# Patient Record
Sex: MALE | Race: OTHER | NOT HISPANIC OR LATINO | URBAN - METROPOLITAN AREA
[De-identification: names, ages, dates, MRNs, and addresses within clinical notes are randomized per-mention and may not be internally consistent; named-entity substitution may affect disease eponyms.]

---

## 2022-10-17 ENCOUNTER — INPATIENT (INPATIENT)
Facility: HOSPITAL | Age: 52
LOS: 11 days | Discharge: HOME HEALTH | DRG: 234 | End: 2022-10-29
Attending: THORACIC SURGERY (CARDIOTHORACIC VASCULAR SURGERY) | Admitting: INTERNAL MEDICINE
Payer: MEDICAID

## 2022-10-17 VITALS — WEIGHT: 139.99 LBS | HEIGHT: 64 IN

## 2022-10-17 DIAGNOSIS — I24.9 ACUTE ISCHEMIC HEART DISEASE, UNSPECIFIED: ICD-10-CM

## 2022-10-17 LAB
ALBUMIN SERPL ELPH-MCNC: 4.2 G/DL — SIGNIFICANT CHANGE UP (ref 3.3–5)
ALP SERPL-CCNC: 55 U/L — SIGNIFICANT CHANGE UP (ref 40–120)
ALT FLD-CCNC: 20 U/L — SIGNIFICANT CHANGE UP (ref 10–45)
ANION GAP SERPL CALC-SCNC: 11 MMOL/L — SIGNIFICANT CHANGE UP (ref 5–17)
APTT BLD: 33.6 SEC — SIGNIFICANT CHANGE UP (ref 27.5–35.5)
AST SERPL-CCNC: 29 U/L — SIGNIFICANT CHANGE UP (ref 10–40)
BASOPHILS # BLD AUTO: 0.02 K/UL — SIGNIFICANT CHANGE UP (ref 0–0.2)
BASOPHILS NFR BLD AUTO: 0.3 % — SIGNIFICANT CHANGE UP (ref 0–2)
BILIRUB SERPL-MCNC: 0.5 MG/DL — SIGNIFICANT CHANGE UP (ref 0.2–1.2)
BUN SERPL-MCNC: 10 MG/DL — SIGNIFICANT CHANGE UP (ref 7–23)
CALCIUM SERPL-MCNC: 9.1 MG/DL — SIGNIFICANT CHANGE UP (ref 8.4–10.5)
CHLORIDE SERPL-SCNC: 104 MMOL/L — SIGNIFICANT CHANGE UP (ref 96–108)
CO2 SERPL-SCNC: 22 MMOL/L — SIGNIFICANT CHANGE UP (ref 22–31)
CREAT SERPL-MCNC: 0.8 MG/DL — SIGNIFICANT CHANGE UP (ref 0.5–1.3)
EGFR: 106 ML/MIN/1.73M2 — SIGNIFICANT CHANGE UP
EOSINOPHIL # BLD AUTO: 0.06 K/UL — SIGNIFICANT CHANGE UP (ref 0–0.5)
EOSINOPHIL NFR BLD AUTO: 0.9 % — SIGNIFICANT CHANGE UP (ref 0–6)
FLUAV AG NPH QL: SIGNIFICANT CHANGE UP
FLUBV AG NPH QL: SIGNIFICANT CHANGE UP
GLUCOSE SERPL-MCNC: 98 MG/DL — SIGNIFICANT CHANGE UP (ref 70–99)
HCT VFR BLD CALC: 48.1 % — SIGNIFICANT CHANGE UP (ref 39–50)
HGB BLD-MCNC: 16.5 G/DL — SIGNIFICANT CHANGE UP (ref 13–17)
IMM GRANULOCYTES NFR BLD AUTO: 0.3 % — SIGNIFICANT CHANGE UP (ref 0–0.9)
INR BLD: 0.99 RATIO — SIGNIFICANT CHANGE UP (ref 0.88–1.16)
LIDOCAIN IGE QN: 63 U/L — HIGH (ref 7–60)
LYMPHOCYTES # BLD AUTO: 2.55 K/UL — SIGNIFICANT CHANGE UP (ref 1–3.3)
LYMPHOCYTES # BLD AUTO: 36.2 % — SIGNIFICANT CHANGE UP (ref 13–44)
MAGNESIUM SERPL-MCNC: 2.1 MG/DL — SIGNIFICANT CHANGE UP (ref 1.6–2.6)
MCHC RBC-ENTMCNC: 32.5 PG — SIGNIFICANT CHANGE UP (ref 27–34)
MCHC RBC-ENTMCNC: 34.3 GM/DL — SIGNIFICANT CHANGE UP (ref 32–36)
MCV RBC AUTO: 94.9 FL — SIGNIFICANT CHANGE UP (ref 80–100)
MONOCYTES # BLD AUTO: 0.65 K/UL — SIGNIFICANT CHANGE UP (ref 0–0.9)
MONOCYTES NFR BLD AUTO: 9.2 % — SIGNIFICANT CHANGE UP (ref 2–14)
NEUTROPHILS # BLD AUTO: 3.74 K/UL — SIGNIFICANT CHANGE UP (ref 1.8–7.4)
NEUTROPHILS NFR BLD AUTO: 53.1 % — SIGNIFICANT CHANGE UP (ref 43–77)
NRBC # BLD: 0 /100 WBCS — SIGNIFICANT CHANGE UP (ref 0–0)
NT-PROBNP SERPL-SCNC: 690 PG/ML — HIGH (ref 0–300)
PLATELET # BLD AUTO: 214 K/UL — SIGNIFICANT CHANGE UP (ref 150–400)
POTASSIUM SERPL-MCNC: 5.5 MMOL/L — HIGH (ref 3.5–5.3)
POTASSIUM SERPL-SCNC: 5.5 MMOL/L — HIGH (ref 3.5–5.3)
PROT SERPL-MCNC: 7.4 G/DL — SIGNIFICANT CHANGE UP (ref 6–8.3)
PROTHROM AB SERPL-ACNC: 11.5 SEC — SIGNIFICANT CHANGE UP (ref 10.5–13.4)
RBC # BLD: 5.07 M/UL — SIGNIFICANT CHANGE UP (ref 4.2–5.8)
RBC # FLD: 12 % — SIGNIFICANT CHANGE UP (ref 10.3–14.5)
RSV RNA NPH QL NAA+NON-PROBE: SIGNIFICANT CHANGE UP
SARS-COV-2 RNA SPEC QL NAA+PROBE: SIGNIFICANT CHANGE UP
SARS-COV-2 RNA SPEC QL NAA+PROBE: SIGNIFICANT CHANGE UP
SODIUM SERPL-SCNC: 137 MMOL/L — SIGNIFICANT CHANGE UP (ref 135–145)
TROPONIN T, HIGH SENSITIVITY RESULT: 14 NG/L — SIGNIFICANT CHANGE UP (ref 0–51)
WBC # BLD: 7.04 K/UL — SIGNIFICANT CHANGE UP (ref 3.8–10.5)
WBC # FLD AUTO: 7.04 K/UL — SIGNIFICANT CHANGE UP (ref 3.8–10.5)

## 2022-10-17 PROCEDURE — 99152 MOD SED SAME PHYS/QHP 5/>YRS: CPT

## 2022-10-17 PROCEDURE — 93308 TTE F-UP OR LMTD: CPT | Mod: 26

## 2022-10-17 PROCEDURE — 99285 EMERGENCY DEPT VISIT HI MDM: CPT

## 2022-10-17 PROCEDURE — 71046 X-RAY EXAM CHEST 2 VIEWS: CPT | Mod: 26

## 2022-10-17 PROCEDURE — 99255 IP/OBS CONSLTJ NEW/EST HI 80: CPT | Mod: GC

## 2022-10-17 PROCEDURE — 93458 L HRT ARTERY/VENTRICLE ANGIO: CPT | Mod: 26

## 2022-10-17 RX ORDER — HEPARIN SODIUM 5000 [USP'U]/ML
4000 INJECTION INTRAVENOUS; SUBCUTANEOUS ONCE
Refills: 0 | Status: DISCONTINUED | OUTPATIENT
Start: 2022-10-17 | End: 2022-10-17

## 2022-10-17 RX ORDER — HEPARIN SODIUM 5000 [USP'U]/ML
INJECTION INTRAVENOUS; SUBCUTANEOUS
Qty: 25000 | Refills: 0 | Status: DISCONTINUED | OUTPATIENT
Start: 2022-10-17 | End: 2022-10-17

## 2022-10-17 RX ORDER — HEPARIN SODIUM 5000 [USP'U]/ML
4000 INJECTION INTRAVENOUS; SUBCUTANEOUS ONCE
Refills: 0 | Status: COMPLETED | OUTPATIENT
Start: 2022-10-17 | End: 2022-10-17

## 2022-10-17 RX ORDER — TICAGRELOR 90 MG/1
180 TABLET ORAL ONCE
Refills: 0 | Status: COMPLETED | OUTPATIENT
Start: 2022-10-17 | End: 2022-10-17

## 2022-10-17 RX ORDER — HEPARIN SODIUM 5000 [USP'U]/ML
INJECTION INTRAVENOUS; SUBCUTANEOUS
Qty: 25000 | Refills: 0 | Status: DISCONTINUED | OUTPATIENT
Start: 2022-10-17 | End: 2022-10-18

## 2022-10-17 RX ORDER — SODIUM CHLORIDE 9 MG/ML
1000 INJECTION INTRAMUSCULAR; INTRAVENOUS; SUBCUTANEOUS
Refills: 0 | Status: DISCONTINUED | OUTPATIENT
Start: 2022-10-17 | End: 2022-10-22

## 2022-10-17 RX ORDER — ATORVASTATIN CALCIUM 80 MG/1
40 TABLET, FILM COATED ORAL AT BEDTIME
Refills: 0 | Status: DISCONTINUED | OUTPATIENT
Start: 2022-10-17 | End: 2022-10-24

## 2022-10-17 RX ORDER — HEPARIN SODIUM 5000 [USP'U]/ML
4000 INJECTION INTRAVENOUS; SUBCUTANEOUS EVERY 6 HOURS
Refills: 0 | Status: DISCONTINUED | OUTPATIENT
Start: 2022-10-17 | End: 2022-10-17

## 2022-10-17 RX ORDER — NICOTINE POLACRILEX 2 MG
1 GUM BUCCAL DAILY
Refills: 0 | Status: DISCONTINUED | OUTPATIENT
Start: 2022-10-17 | End: 2022-10-24

## 2022-10-17 RX ORDER — HEPARIN SODIUM 5000 [USP'U]/ML
4000 INJECTION INTRAVENOUS; SUBCUTANEOUS EVERY 6 HOURS
Refills: 0 | Status: DISCONTINUED | OUTPATIENT
Start: 2022-10-17 | End: 2022-10-18

## 2022-10-17 RX ADMIN — SODIUM CHLORIDE 100 MILLILITER(S): 9 INJECTION INTRAMUSCULAR; INTRAVENOUS; SUBCUTANEOUS at 15:56

## 2022-10-17 RX ADMIN — HEPARIN SODIUM 800 UNIT(S)/HR: 5000 INJECTION INTRAVENOUS; SUBCUTANEOUS at 13:17

## 2022-10-17 RX ADMIN — TICAGRELOR 180 MILLIGRAM(S): 90 TABLET ORAL at 13:13

## 2022-10-17 RX ADMIN — HEPARIN SODIUM 4000 UNIT(S): 5000 INJECTION INTRAVENOUS; SUBCUTANEOUS at 13:16

## 2022-10-17 RX ADMIN — ATORVASTATIN CALCIUM 40 MILLIGRAM(S): 80 TABLET, FILM COATED ORAL at 21:19

## 2022-10-17 NOTE — PATIENT PROFILE ADULT - NSPROGENBLOODRESTRICT_GEN_A_NUR
none
I will STOP taking the medications listed below when I get home from the hospital:    propranolol 20 mg/5 mL oral solution  -- 7.5 milliliter(s) by mouth 3 times a day

## 2022-10-17 NOTE — ED PROVIDER NOTE - PROGRESS NOTE DETAILS
Attending Rohini George: cards fellow at bedside. request full ACS treatment Attending Rohini George: d/w cards will need cath

## 2022-10-17 NOTE — H&P ADULT - HISTORY OF PRESENT ILLNESS
53yo M daily smoker 1ppd, Finnish presents today with chest pain from [facility] w abn EKG. he has Radiation to the L arm. Woke up with pain. Vomited x3. NBNB. States he has had intermittent chest pain ongoing for the past year. Has never seen a cardiologist or had EST/echo performed. EKG in the D w Anterolateral ST -T abn. going for [facility] as per cardiology team

## 2022-10-17 NOTE — H&P ADULT - ASSESSMENT
52 year old Greenlandic male, current 0.5 ppd smoker, with no know significant past medical history who presented to the ED at [facility] after being transferred from [facility] with abnormal ECG findings. The patient presented to [facility] earlier today with a 1 year history of exertional chest pain, consistent with typical angina, worse in the last 24 hours. Troponin T at [facility] WNL, hsTroponin ~2 hours later on admission labs WNL. On arrival ECG demonstrated ST segmental elevations in anterolateral leads with Q waves suggestive of prior infarct with ongoing symptoms. ECG did not meet criteria for STEMI. He was given brilenta 180 mg PO, loaded with IV heparin and started on heparin ggt. Patient was consented for cardiac catheterization. Patient to be admitted for management of NSTEMI. Seen by Cardiology    NSTEMI  - Patient s/p  + Brilinta 360 mg total  - Patient started on heparin ggt in the ED  - will con't aintain heparin ggt with q6 aPTTs, titrate to goal aPTT of 60-90 sec, until cardiac cath  - Follow up intervention/cath report thereafter  - TTE  - check Lipid panel, TSH, HGBA1C

## 2022-10-17 NOTE — ED PROVIDER NOTE - OBJECTIVE STATEMENT
53yo M daily smoker 1ppd, Sw[place] presents today with chest pain. Radiation to the L arm. Woke up with pain. Vomited x3. NBNB. States he has had intermittent chest pain ongoing for the past year. Has never seen a cardiologist or had EST/echo performed. 51yo M daily smoker 1ppd, British presents today with chest pain. Radiation to the L arm. Woke up with pain. Vomited x3. NBNB. States he has had intermittent chest pain ongoing for the past year. Has never seen a cardiologist or had EST/echo performed.  Attending Rohini George: agree with above 53 yo male presenting with chest pain with associated vomiting that began today. went to [facility] and had concerningn ekg and transferred. given aspirin and transferred. upon arrival pt denies any chest discomfort of sob. no black or bloody stools. no recent URI. no black stools. no h/o cardiac disease. +smoker.

## 2022-10-17 NOTE — ED ADULT NURSE NOTE - CAS TRG GENERAL NORM CIRC DET
Strong peripheral pulses
Ears: no ear pain and no hearing problems.Nose: no nasal congestion and no nasal drainage.Mouth/Throat: no dysphagia, no hoarseness and no throat pain.Neck: no lumps, no pain, no stiffness and no swollen glands.

## 2022-10-17 NOTE — ED ADULT NURSE NOTE - NS ED NURSE TRANSPORT WITH
Heparin/IV pump Heparin and cath labs cardiac monitor with 2 cath lab RN's/Cardiac Monitor/Defib/ACLS/Rescue Kit/O2/BVM/IV pump/ACLS Rescue Kit

## 2022-10-17 NOTE — CONSULT NOTE ADULT - SUBJECTIVE AND OBJECTIVE BOX
Patient seen and evaluated at bedside    Reason for consult:    HPI:  Patient is a 52 year old [place] Verde[place] male, current 0.5 ppd smoker, with no know significant past medical history who presented to the ED at [facility] after being transferred from [facility] with abnormal ECG findings. The patient presented to [facility] earlier today with a 1 year history of exertional chest pain, worse in the last 24 hours. HE states that over the past ~1 year he has had exertional chest pain, L sided with radiation to the R arm. Pain is described as pressure like, onset with physical activity and relieved with rest. He states he can walk ~3 blocks prior to onset of the pain, and pain is relieved after "a minute" of rest. He endorses occasional associated nausea. He denies associated vomiting, shortness of breath, jaw pain, diaphoresis, headache, changes in vision, abdominal pain, LE/UE edema, orthopnea, PND.    [facility] Course:  At [facility] was given  mg, brilenta 180 mg after ECG with ST changes in anterolateral leads concerning for ischemia. Additionally was given famotidine IV 20 mg, 1L NS. BMP, CBC both WNL. Troponin T negative x1. He was thereafter transferred to [facility] ED for ischemic evaluation.     [facility] ED Course: Vitals on arrival to the ED T 98.3 | HR 73 | /87 | SpO2 100% ORA. ECG demonstrated ST segmental elevations in anterolateral leads with Q waves suggestive of prior infarct with ongoing symptoms. ECG did not meet criteria for STEMI. He was given brilenta 180 mg PO, loaded with IV heparin and started on heparin ggt. Patient was consented for cardiac catheterization    MHx: No known PMHx; no known prior ECGs, no known stress tests, no known echocardiograms    Medications: ASA 81 mg QD    PSHx: NC    Allergies:  No Known Allergies    Current Medications:   heparin   Injectable 4000 Unit(s) IV Push every 6 hours PRN  heparin  Infusion.  Unit(s)/Hr IV Continuous <Continuous>  sodium chloride 0.9%. 1000 milliLiter(s) IV Continuous <Continuous>      FAMILY HISTORY: Mother  of "heart disease" and complications of DM. Father additionally with reported hx of "heart disease"       Social History: Current 0.5 ppd smoker; has ~20 pack year hx of smoking. Immigrated to the  from [facility] ~4-5 years ago. Works as a . Independent in [employer]. Deneis hx of recreationl drug use. States he will "occasionally" have a beer. Denies regular EtOH use.     Review of Systems:  REVIEW OF SYSTEMS:  CONSTITUTIONAL: No weakness, fevers or chills  EYES/ENT: No visual changes;  No dysphagia  NECK: No pain or stiffness  RESPIRATORY: No cough, wheezing, hemoptysis; No shortness of breath  CARDIOVASCULAR: No chest pain or palpitations; No lower extremity edema  GASTROINTESTINAL: No abdominal or epigastric pain. No nausea, vomiting, or hematemesis; No diarrhea or constipation. No melena or hematochezia.  BACK: No back pain  NEUROLOGICAL: No numbness or weakness   All other review of systems is negative unless indicated above.    [ ] All other systems negative  [ ] Unable to assess ROS due to    Physical Exam:  T(F): 98.9 (10-), Max: 98.9 (10-17)  HR: 78 (10-) (69 - 78)  BP: 148/95 (10-) (125/88 - 148/95)  RR: 18 (10-17)  SpO2: 99% (10-17)  GENERAL: No acute distress, well-developed  HEAD:  Atraumatic, Normocephalic  ENT: EOMI, PERRLA, conjunctiva and sclera clear, Neck supple, No JVD, moist mucosa  CHEST/LUNG: Clear to auscultation bilaterally; No wheeze, equal breath sounds bilaterally   HEART: Regular rate and rhythm; No appreciable murmurs, rubs, or gallops  ABDOMEN: Soft, Nontender, Nondistended; Bowel sounds present  EXTREMITIES:  No clubbing, cyanosis, or edema  PSYCH: Nl behavior, nl affect  NEUROLOGY: AAOx3, non-focal, cranial nerves intact  SKIN: Normal color, No rashes or lesions  LINES: PIV in R AC    CXR: Personally reviewed    Labs: Personally reviewed                        16.5   7.04  )-----------( 214      ( 17 Oct 2022 13:16 )             48.1     10-17    137  |  104  |  10  ----------------------------<  98  5.5<H>   |  22  |  0.80    Ca    9.1      17 Oct 2022 13:16  Mg     2.1     10-17    TPro  7.4  /  Alb  4.2  /  TBili  0.5  /  DBili  x   /  AST  29  /  ALT  20  /  AlkPhos  55  10-17    PT/INR - ( 17 Oct 2022 13:16 )   PT: 11.5 sec;   INR: 0.99 ratio         PTT - ( 17 Oct 2022 13:16 )  PTT:33.6 sec  Serum Pro-Brain Natriuretic Peptide: 690 pg/mL (10-17 @ 13:16)

## 2022-10-17 NOTE — ED ADULT NURSE NOTE - CHPI ED NUR SYMPTOMS NEG
no back pain/no chills/no congestion/no diaphoresis/no dizziness/no fever/no shortness of breath/no syncope

## 2022-10-17 NOTE — ED PROVIDER NOTE - PHYSICAL EXAMINATION
Attending Rohini George: Gen: NAD, heent: atrauamtic, eomi, perrla, mmm, op pink, uvula midline, neck; nttp, no nuchal rigidity, chest: nttp, no crepitus, cv: rrr, no murmurs, lungs: ctab, abd: soft, nontender, nondistended, no peritoneal signs, , no guarding, ext: wwp, neg homans, skin: no rash, neuro: awake and alert, following commands, speech clear, sensation and strength intact, no focal deficits

## 2022-10-17 NOTE — ED PROVIDER NOTE - ATTENDING CONTRIBUTION TO CARE
Attending MD Rohini George:  I personally have seen and examined this patient.  Resident note reviewed and agree on plan of care and except where noted.  See HPI, PE, and MDM for details.

## 2022-10-17 NOTE — CONSULT NOTE ADULT - ASSESSMENT
Patient is a 52 year old Berm[place]ian male, current 0.5 ppd smoker, with no know significant past medical history who presented to the ED at [facility] after being transferred from [facility] with abnormal ECG findings. The patient presented to [facility] earlier today with a 1 year history of exertional chest pain, consistent with typical angina, worse in the last 24 hours. Troponin T at [facility] WNL, hsTroponin ~2 hours later on admission labs WNL. On arrival ECG demonstrated ST segmental elevations in anterolateral leads with Q waves suggestive of prior infarct with ongoing symptoms. ECG did not meet criteria for STEMI. He was given brilenta 180 mg PO, loaded with IV heparin and started on heparin ggt. Patient was consented for cardiac catheterization. Patient to be admitted for management of NSTEMI.    #NSTEMI  - Patient s/p  + Brilinta 360 mg total  - Patient started on heparin ggt in the ED  - Maintain heparin ggt with q6 aPTTs, titrate to goal aPTT of 60-90 sec, until cardiac cath  - Follow up intervention/cath report thereafter  - Obtain TTE prior to discharge  - Cardiology to follow

## 2022-10-17 NOTE — ED ADULT NURSE NOTE - OBJECTIVE STATEMENT
PT is a 52 year old A&OX4 male with no significant PMH who presents to the ED via EMS as a STEMI transfer from [facility]. Per EMS, [facility] performed an EKG and transferred PT, no medication was administered. PT endorses chest pain for 1 year that became acutely worse last night. PT also endorsing 3-4 episodes of vomiting this morning. PT denies SOB, diarrhea, weakness, and fevers at home. PT is alert, oriented and resting comfortably in bed, breathing unlabored on room air, and speaking in complete sentences. Abdomen is soft, non-tender, and non-distended. Skin is warm and dry, no diaphoresis noted. No edema noted to B/L extremities. Strong strength in B/L extremities, sensation intact. IV access established 18G in right AC by [facility] and 18G in left AC by [facility] ED RN. EKG completed, PT placed on cardiac monitor. Actual weight obtained 68.7 kg. PT endorsing active cigarette smoking, 1 pack per day. PT placed in hospital gown, non-slip socks applied. PT ambulatory with steady gait. Safety and comfort maintained. Family at the bedside.

## 2022-10-17 NOTE — ED PROVIDER NOTE - CLINICAL SUMMARY MEDICAL DECISION MAKING FREE TEXT BOX
Attending Rohini George: 51 yo male current smoker transferred for abnormal ekg. upon arrival pt denies any chest pain. ekg with nonspecific st elevation and t wave inversions. cards fellow called upon arrival. pt placed on monitor. multiple risk factors for cardiac disease. will sergo need admission. no back pain and strong pulses less likely acute dissection

## 2022-10-17 NOTE — H&P ADULT - NSHPPHYSICALEXAM_GEN_ALL_CORE
T(C): 36.8 (10-17-22 @ 12:50), Max: 36.8 (10-17-22 @ 12:50)  HR: 69 (10-17-22 @ 13:30) (69 - 73)  BP: 125/88 (10-17-22 @ 13:30) (125/88 - 137/87)  RR: 17 (10-17-22 @ 13:30) (17 - 18)  SpO2: 99% (10-17-22 @ 13:30) (99% - 100%)    PHYSICAL EXAM:  GENERAL: NAD, well-developed  HEAD:  Atraumatic, Normocephalic  EYES: EOMI, PERRLA, conjunctiva and sclera clear  NECK: Supple, No JVD  CHEST/LUNG: Clear to auscultation bilaterally; No wheeze  HEART: Regular rate and rhythm; No murmurs, rubs, or gallops  ABDOMEN: Soft, Nontender, Nondistended; Bowel sounds present  EXTREMITIES:  2+ Peripheral Pulses, No clubbing, cyanosis, or edema  PSYCH: AAOx3  NEUROLOGY: non-focal  SKIN: No rashes or lesions

## 2022-10-17 NOTE — CONSULT NOTE ADULT - ATTENDING COMMENTS
52 year old man with progressive exertional angina, no symptoms at rest, EKG with ST-T wave abnormality consistent with ischemia, troponin is not elevated. Diagnosis is unstable angina, ACS protocol started and plan early coronary angiography. He is an active cigarette smoker.    To contact call Cardiology Fellow or Attending as listed on [email or web address] password: [email or web address].

## 2022-10-17 NOTE — ED ADULT NURSE REASSESSMENT NOTE - NS ED NURSE REASSESS COMMENT FT1
Report given to cath lab RN. PT transported to cath lab with 2 cath lab RN's on cath lab's cardiac monitor with their ACLS rescue kit. PT on heparin running on a pump at 8 mL/hour. PT's chart including EKG given to cath lab RN at the bedside.

## 2022-10-17 NOTE — ED ADULT NURSE REASSESSMENT NOTE - NS ED NURSE REASSESS COMMENT FT1
PT's actual weight obtained 68.7 kg. Heparin administered as per MD order with 2 RN's at the bedside. Heparin dose confirmed via ACS Heparin Nomogram. ACS nomogram states initial bolus should be 4,100 units but Dr. Maria A George states the STEMI order set prevents her from being able to order the correct dose. Per Dr. George, PT to receive initial dose of 4,000 units per order. PT educated on signs and symptoms of bleeding, verbalized understanding. Urinal provided. Safety and comfort maintained. Call bell within reach.

## 2022-10-18 DIAGNOSIS — I10 ESSENTIAL (PRIMARY) HYPERTENSION: ICD-10-CM

## 2022-10-18 DIAGNOSIS — E78.5 HYPERLIPIDEMIA, UNSPECIFIED: ICD-10-CM

## 2022-10-18 DIAGNOSIS — R07.9 CHEST PAIN, UNSPECIFIED: ICD-10-CM

## 2022-10-18 LAB
A1C WITH ESTIMATED AVERAGE GLUCOSE RESULT: 5.7 % — HIGH (ref 4–5.6)
APTT BLD: 99.4 SEC — HIGH (ref 27.5–35.5)
CHOLEST SERPL-MCNC: 203 MG/DL — HIGH
ESTIMATED AVERAGE GLUCOSE: 117 MG/DL — HIGH (ref 68–114)
HCT VFR BLD CALC: 45.5 % — SIGNIFICANT CHANGE UP (ref 39–50)
HDLC SERPL-MCNC: 54 MG/DL — SIGNIFICANT CHANGE UP
HGB BLD-MCNC: 15.5 G/DL — SIGNIFICANT CHANGE UP (ref 13–17)
LIPID PNL WITH DIRECT LDL SERPL: 136 MG/DL — HIGH
MCHC RBC-ENTMCNC: 31.8 PG — SIGNIFICANT CHANGE UP (ref 27–34)
MCHC RBC-ENTMCNC: 34.1 GM/DL — SIGNIFICANT CHANGE UP (ref 32–36)
MCV RBC AUTO: 93.4 FL — SIGNIFICANT CHANGE UP (ref 80–100)
NON HDL CHOLESTEROL: 150 MG/DL — HIGH
NRBC # BLD: 0 /100 WBCS — SIGNIFICANT CHANGE UP (ref 0–0)
PLATELET # BLD AUTO: 218 K/UL — SIGNIFICANT CHANGE UP (ref 150–400)
RBC # BLD: 4.87 M/UL — SIGNIFICANT CHANGE UP (ref 4.2–5.8)
RBC # FLD: 11.9 % — SIGNIFICANT CHANGE UP (ref 10.3–14.5)
TRIGL SERPL-MCNC: 70 MG/DL — SIGNIFICANT CHANGE UP
TSH SERPL-MCNC: 1.61 UIU/ML — SIGNIFICANT CHANGE UP (ref 0.27–4.2)
WBC # BLD: 7.31 K/UL — SIGNIFICANT CHANGE UP (ref 3.8–10.5)
WBC # FLD AUTO: 7.31 K/UL — SIGNIFICANT CHANGE UP (ref 3.8–10.5)

## 2022-10-18 PROCEDURE — 99233 SBSQ HOSP IP/OBS HIGH 50: CPT | Mod: GC

## 2022-10-18 PROCEDURE — 93306 TTE W/DOPPLER COMPLETE: CPT | Mod: 26

## 2022-10-18 RX ORDER — HEPARIN SODIUM 5000 [USP'U]/ML
2500 INJECTION INTRAVENOUS; SUBCUTANEOUS EVERY 6 HOURS
Refills: 0 | Status: DISCONTINUED | OUTPATIENT
Start: 2022-10-18 | End: 2022-10-22

## 2022-10-18 RX ORDER — HEPARIN SODIUM 5000 [USP'U]/ML
5500 INJECTION INTRAVENOUS; SUBCUTANEOUS EVERY 6 HOURS
Refills: 0 | Status: DISCONTINUED | OUTPATIENT
Start: 2022-10-18 | End: 2022-10-22

## 2022-10-18 RX ORDER — HEPARIN SODIUM 5000 [USP'U]/ML
5500 INJECTION INTRAVENOUS; SUBCUTANEOUS ONCE
Refills: 0 | Status: COMPLETED | OUTPATIENT
Start: 2022-10-18 | End: 2022-10-18

## 2022-10-18 RX ORDER — HEPARIN SODIUM 5000 [USP'U]/ML
INJECTION INTRAVENOUS; SUBCUTANEOUS
Qty: 25000 | Refills: 0 | Status: DISCONTINUED | OUTPATIENT
Start: 2022-10-18 | End: 2022-10-22

## 2022-10-18 RX ORDER — ASPIRIN/CALCIUM CARB/MAGNESIUM 324 MG
81 TABLET ORAL DAILY
Refills: 0 | Status: DISCONTINUED | OUTPATIENT
Start: 2022-10-18 | End: 2022-10-24

## 2022-10-18 RX ADMIN — Medication 1 PATCH: at 19:54

## 2022-10-18 RX ADMIN — HEPARIN SODIUM 5500 UNIT(S): 5000 INJECTION INTRAVENOUS; SUBCUTANEOUS at 10:06

## 2022-10-18 RX ADMIN — Medication 1 PATCH: at 13:23

## 2022-10-18 RX ADMIN — ATORVASTATIN CALCIUM 40 MILLIGRAM(S): 80 TABLET, FILM COATED ORAL at 19:59

## 2022-10-18 RX ADMIN — Medication 81 MILLIGRAM(S): at 07:01

## 2022-10-18 RX ADMIN — HEPARIN SODIUM 1200 UNIT(S)/HR: 5000 INJECTION INTRAVENOUS; SUBCUTANEOUS at 10:05

## 2022-10-18 RX ADMIN — HEPARIN SODIUM 1200 UNIT(S)/HR: 5000 INJECTION INTRAVENOUS; SUBCUTANEOUS at 17:23

## 2022-10-18 NOTE — CONSULT NOTE ADULT - ASSESSMENT
52 year old man with progressive exertional angina, no symptoms at rest, EKG with ST-T wave abnormality consistent with ischemia, troponin is not elevated.  He is an active cigarette smoker 0.5 ppd smoker. Study demonstrated severe 3 vessel disease. Echo demonstrates apical akinesis, apical thrombus, overall EF 45%. Maintain on heparin. CT surgery evaluation for revascularization 52 year old man with progressive exertional angina, no symptoms at rest, EKG with ST-T wave abnormality consistent with ischemia, troponin is not elevated.  He is an active cigarette smoker 0.5 ppd smoker. Study demonstrated severe 3 vessel disease. Echo demonstrates apical akinesis, apical thrombus, overall EF 45%. Maintain on heparin. CT surgery evaluation for revascularization    Preop work up initiated for CABG evaluation   Dr Ellis to review angiogram and imaging  check p2y12 levels in AM   hold p2y12i - hold luma 52 year old man with progressive exertional angina, no symptoms at rest, EKG with ST-T wave abnormality consistent with ischemia, troponin is not elevated.  He is an active cigarette smoker 0.5 ppd smoker. Study demonstrated severe 3 vessel disease. Echo demonstrates apical akinesis, apical thrombus, overall EF 45%. Maintain on heparin. CT surgery evaluation for revascularization    Preop work up initiated for CABG evaluation   Dr Ellis to review angiogram and imaging  check p2y12 serum levels in AM   hold p2y12i - hold brillinta  on hep gtt for LV thrombus  start betablocker as tolerated - currently nsr 60s   52 year old man with progressive exertional angina, no symptoms at rest, EKG with ST-T wave abnormality consistent with ischemia, troponin is not elevated.  He is an active cigarette smoker 0.5 ppd smoker. Study demonstrated severe 3 vessel disease. Echo demonstrates apical akinesis, apical thrombus, overall EF 45%. Maintain on heparin. CT surgery evaluation for revascularization    Preop work up initiated for CABG evaluation   Dr Ellis to review angiogram and imaging  TTE completed  check p2y12 serum levels in AM   hold p2y12i - hold brillinta  on hep gtt for LV thrombus  start betablocker as tolerated - currently nsr 60s

## 2022-10-18 NOTE — PROGRESS NOTE ADULT - SUBJECTIVE AND OBJECTIVE BOX
Patient is a 52y old  Male who presents with a chief complaint of acute MI, NSTEMI (18 Oct 2022 12:28)      SUBJECTIVE / OVERNIGHT EVENTS: s/p LHC: has 3 vessel dz, CTS eval pending, remains on Heparin drip    MEDICATIONS  (STANDING):  aspirin enteric coated 81 milliGRAM(s) Oral daily  atorvastatin 40 milliGRAM(s) Oral at bedtime  heparin  Infusion.  Unit(s)/Hr (12 mL/Hr) IV Continuous <Continuous>  nicotine -   7 mG/24Hr(s) Patch 1 Patch Transdermal daily  sodium chloride 0.9%. 1000 milliLiter(s) (100 mL/Hr) IV Continuous <Continuous>    MEDICATIONS  (PRN):  heparin   Injectable 5500 Unit(s) IV Push every 6 hours PRN For aPTT less than 40  heparin   Injectable 2500 Unit(s) IV Push every 6 hours PRN For aPTT between 40 - 57      Vital Signs Last 24 Hrs  T(F): 98.1 (10-18-22 @ 09:25), Max: 98.1 (10-18-22 @ 09:25)  HR: 55 (10-18-22 @ 09:25) (55 - 78)  BP: 107/68 (10-18-22 @ 09:25) (98/65 - 144/109)  RR: 16 (10-18-22 @ 09:25) (16 - 17)  SpO2: 96% (10-18-22 @ 09:25) (94% - 98%)  Telemetry:   CAPILLARY BLOOD GLUCOSE        I&O's Summary    17 Oct 2022 07:01  -  18 Oct 2022 07:00  --------------------------------------------------------  IN: 240 mL / OUT: 300 mL / NET: -60 mL    18 Oct 2022 07:01  -  18 Oct 2022 17:59  --------------------------------------------------------  IN: 480 mL / OUT: 0 mL / NET: 480 mL        PHYSICAL EXAM:  GENERAL: NAD, well-developed  HEAD:  Atraumatic, Normocephalic  EYES: EOMI, PERRLA, conjunctiva and sclera clear  NECK: Supple, No JVD  CHEST/LUNG: Clear to auscultation bilaterally; No wheeze  HEART: Regular rate and rhythm; No murmurs, rubs, or gallops  ABDOMEN: Soft, Nontender, Nondistended; Bowel sounds present  EXTREMITIES:  2+ Peripheral Pulses, No clubbing, cyanosis, or edema  PSYCH: AAOx3  NEUROLOGY: non-focal  SKIN: No rashes or lesions    LABS:                        15.5   7.31  )-----------( 218      ( 18 Oct 2022 16:37 )             45.5     10-17    137  |  104  |  10  ----------------------------<  98  5.5<H>   |  22  |  0.80    Ca    9.1      17 Oct 2022 13:16  Mg     2.1     10-17    TPro  7.4  /  Alb  4.2  /  TBili  0.5  /  DBili  x   /  AST  29  /  ALT  20  /  AlkPhos  55  10-17    PT/INR - ( 17 Oct 2022 13:16 )   PT: 11.5 sec;   INR: 0.99 ratio         PTT - ( 18 Oct 2022 16:37 )  PTT:99.4 sec          RADIOLOGY & ADDITIONAL TESTS:    Imaging Personally Reviewed:    Consultant(s) Notes Reviewed:      Care Discussed with Consultants/Other Providers:

## 2022-10-18 NOTE — PROGRESS NOTE ADULT - SUBJECTIVE AND OBJECTIVE BOX
Patient seen and examined at bedside.    Overnight Events: No acute events overnight. Denies chest pain or SOB      REVIEW OF SYSTEMS:  Constitutional:     [ x] negative [ ] fevers [ ] chills [ ] weight loss [ ] weight gain  HEENT:                  [ x] negative [ ] dry eyes [ ] eye irritation [ ] postnasal drip [ ] nasal congestion  CV:                         [x ] negative  [ ] chest pain [ ] orthopnea [ ] palpitations [ ] murmur  Resp:                     [ x] negative [ ] cough [ ] shortness of breath [ ] dyspnea [ ] wheezing [ ] sputum [ ]hemoptysis  GI:                          [ x] negative [ ] nausea [ ] vomiting [ ] diarrhea [ ] constipation [ ] abd pain [ ] dysphagia   :                        [ x] negative [ ] dysuria [ ] nocturia [ ] hematuria [ ] increased urinary frequency  Musculoskeletal: [ x] negative [ ] back pain [ ] myalgias [ ] arthralgias [ ] fracture  Skin:                       [ x] negative [ ] rash [ ] itch  Neurological:        [ x] negative [ ] headache [ ] dizziness [ ] syncope [ ] weakness [ ] numbness  Psychiatric:           [ x] negative [ ] anxiety [ ] depression  Endocrine:            [ x] negative [ ] diabetes [ ] thyroid problem  Heme/Lymph:      [ x] negative [ ] anemia [ ] bleeding problem  Allergic/Immune: [x ] negative [ ] itchy eyes [ ] nasal discharge [ ] hives [ ] angioedema    [x ] All other systems negative  [ ] Unable to assess ROS due to    Current Meds:  atorvastatin 40 milliGRAM(s) Oral at bedtime  heparin   Injectable 4000 Unit(s) IV Push every 6 hours PRN  nicotine -   7 mG/24Hr(s) Patch 1 Patch Transdermal daily  sodium chloride 0.9%. 1000 milliLiter(s) IV Continuous <Continuous>      PAST MEDICAL & SURGICAL HISTORY:      Vitals:  T(F): 98 (10-18), Max: 98.9 (10-17)  HR: 62 (10-18) (62 - 81)  BP: 105/68 (10-18) (98/65 - 148/95)  RR: 17 (10-18)  SpO2: 96% (10-18)  I&O's Summary    17 Oct 2022 07:01  -  18 Oct 2022 05:21  --------------------------------------------------------  IN: 240 mL / OUT: 300 mL / NET: -60 mL        Physical Exam:  Appearance: No acute distress; well appearing  Eyes: PERRL, EOMI, pink conjunctiva  HENT: Normal oral mucosa  Cardiovascular: RRR, S1, S2, no murmurs, rubs, or gallops; no edema; no JVD  Respiratory: Clear to auscultation bilaterally  Gastrointestinal: soft, non-tender, non-distended with normal bowel sounds  Musculoskeletal: No clubbing; no joint deformity   Neurologic: Non-focal  Lymphatic: No lymphadenopathy  Psychiatry: AAOx3, mood & affect appropriate  Skin: No rashes, ecchymoses, or cyanosis                          16.5   7.04  )-----------( 214      ( 17 Oct 2022 13:16 )             48.1     10-17    137  |  104  |  10  ----------------------------<  98  5.5<H>   |  22  |  0.80    Ca    9.1      17 Oct 2022 13:16  Mg     2.1     10-17    TPro  7.4  /  Alb  4.2  /  TBili  0.5  /  DBili  x   /  AST  29  /  ALT  20  /  AlkPhos  55  10-17    PT/INR - ( 17 Oct 2022 13:16 )   PT: 11.5 sec;   INR: 0.99 ratio         PTT - ( 17 Oct 2022 13:16 )  PTT:33.6 sec      Serum Pro-Brain Natriuretic Peptide: 690 pg/mL (10-17 @ 13:16)             Universal Safety Interventions

## 2022-10-18 NOTE — PROGRESS NOTE ADULT - ASSESSMENT
Patient is a 52 year old [place] male, current 0.5 ppd smoker, with no know significant past medical history who presented to the ED at [facility] after being transferred from [facility] with abnormal ECG findings. The patient presented to [facility] earlier today with a 1 year history of exertional chest pain, consistent with typical angina, worse in the last 24 hours. Troponin T at [facility] WNL, hsTroponin ~2 hours later on admission labs WNL. On arrival ECG demonstrated ST segmental elevations in anterolateral leads with Q waves suggestive of prior infarct with ongoing symptoms. ECG did not meet criteria for STEMI. He was given brilenta 180 mg PO, loaded with IV heparin and started on heparin ggt. Patient was consented for cardiac catheterization. Patient to be admitted for management of NSTEMI.    #NSTEMI  #Triple vessel CAD: LHC showing prox LAD , mid Cx 50%, prox RCA 40%, RPL    - Agree with CT Surgery evaluation   - Patient s/p  + Brilinta 360 mg total; continue ASA 81 qd, hold Brilinta pending CT Surg eval  - TTE and cardiac viability study pending  - continue Atorvastatin 40 qHS

## 2022-10-18 NOTE — PROGRESS NOTE ADULT - SUBJECTIVE AND OBJECTIVE BOX
HPI:   53yo M daily smoker 1ppd, [place] presents today with chest pain from [place] [facility] w abn EKG. he has Radiation to the L arm. Woke up with pain. Vomited x3. NBNB. States he has had intermittent chest pain ongoing for the past year. Has never seen a cardiologist or had EST/echo performed. EKG in the D w Anterolateral ST -T abn. going for [facility] as per cardiology team (17 Oct 2022 14:25)    Patient is a 52y old  Male who presents with a chief complaint of acute MI, NSTEMI (17 Oct 2022 15:54)          Allergies    No Known Allergies    Intolerances        Medications:  atorvastatin 40 milliGRAM(s) Oral at bedtime  heparin   Injectable 4000 Unit(s) IV Push every 6 hours PRN  heparin  Infusion.  Unit(s)/Hr IV Continuous <Continuous>  nicotine -   7 mG/24Hr(s) Patch 1 Patch Transdermal daily  sodium chloride 0.9%. 1000 milliLiter(s) IV Continuous <Continuous>      Vitals:  T(C): 36.8 (10-17-22 @ 17:05), Max: 37.2 (10-17-22 @ 15:15)  HR: 65 (10-17-22 @ 22:20) (65 - 81)  BP: 116/64 (10-17-22 @ 22:20) (98/65 - 148/95)  BP(mean): 75 (10-17-22 @ 21:20) (73 - 118)  RR: 16 (10-17-22 @ 19:20) (16 - 19)  SpO2: 96% (10-17-22 @ 22:20) (94% - 100%)  Wt(kg): --  Daily Height in cm: 162.56 (17 Oct 2022 12:40)    Daily   I&O's Summary    17 Oct 2022 07:01  -  18 Oct 2022 02:12  --------------------------------------------------------  IN: 240 mL / OUT: 300 mL / NET: -60 mL          Physical Exam:  Appearance: Normal  Eyes: PERRL, EOMI  HENT: Normal oral mus[place]a, NC/AT  Cardiovascular: S1S2, RRR, No M/R/G, no JVD, No Lower extremity edema  Procedural Access Site: No hematoma, Non-tender to palpation, 2+ pulse, No bruit, No Ecchymosis  Respiratory: Clear to auscultation bilaterally  Gastrointestinal: Soft, Non tender, Normal Bowel Sounds  Musculoskeletal: No clubbing, No joint deformity   Neurologic: Non-focal  Lymphatic: No lymphadenopathy  Psychiatry: AAOx3, Mood & affect appropriate  Skin: No rashes, No ecchymoses, No cyanosis    10-17    137  |  104  |  10  ----------------------------<  98  5.5<H>   |  22  |  0.80    Ca    9.1      17 Oct 2022 13:16  Mg     2.1     10-17    TPro  7.4  /  Alb  4.2  /  TBili  0.5  /  DBili  x   /  AST  29  /  ALT  20  /  AlkPhos  55  10-17    PT/INR - ( 17 Oct 2022 13:16 )   PT: 11.5 sec;   INR: 0.99 ratio         PTT - ( 17 Oct 2022 13:16 )  PTT:33.6 sec      Serum Pro-Brain Natriuretic Peptide: 690 pg/mL (10-17 @ 13:16)    Lipid panel   Hgb A1c                         16.5   7.04  )-----------( 214      ( 17 Oct 2022 13:16 )             48.1

## 2022-10-18 NOTE — PROGRESS NOTE ADULT - PROBLEM SELECTOR PLAN 1
Monitor groin site for swelling, bleeding  TTE  Viability study CT surgery consult  Monitor groin site for swelling, bleeding  TTE  Viability study

## 2022-10-18 NOTE — PROGRESS NOTE ADULT - ASSESSMENT
Patient is a 52 year old [place] male, current 0.5 ppd smoker, with no know significant past medical history who presented to the ED at [facility] after being transferred from [facility] with abnormal ECG findings. The patient presented to [facility] earlier today with a 1 year history of exertional chest pain, consistent with typical angina, worse in the last 24 hours. Troponin T at [facility] WNL, hsTroponin ~2 hours later on admission labs WNL. On arrival ECG demonstrated ST segmental elevations in anterolateral leads with Q waves suggestive of prior infarct with ongoing symptoms. ECG did not meet criteria for STEMI. He was given brilenta 180 mg PO, loaded with IV heparin and started on heparin ggt. Patient was consented for cardiac catheterization. Patient to be admitted for management of NSTEMI.  NSTEMI  Triple vessel CAD: LHC showing prox LAD , mid Cx 50%, prox RCA 40%, RPL    - awaiting CTS eval  - s/p  + Brilinta 360 mg total; continue ASA 81 qd, dc Brilinta pending CT Surg eval  - cardiac viability study pending  - continue Atorvastatin 40 qHS  - BP is soft, HR below 60, unable to start BB  - TTE: EF 45%, on heparin drip for apical thrombus on TTE

## 2022-10-18 NOTE — CONSULT NOTE ADULT - SUBJECTIVE AND OBJECTIVE BOX
CONSULT CT SURGERY.         per HPI:   53yo M daily smoker 1ppd, English presents today with chest pain from [place] [facility] w abn EKG. he has Radiation to the L arm. Woke up with pain. Vomited x3. NBNB. States he has had intermittent chest pain ongoing for the past year. Has never seen a cardiologist or had EST/echo performed. EKG in the D w Anterolateral ST -T abn. going for [facility] as per cardiology team     CT Surgery consulted for CABG    Past Medical History      Past Surgical History  none    MEDICATIONS  (STANDING):  aspirin enteric coated 81 milliGRAM(s) Oral daily  atorvastatin 40 milliGRAM(s) Oral at bedtime  heparin  Infusion.  Unit(s)/Hr (12 mL/Hr) IV Continuous <Continuous>  nicotine -   7 mG/24Hr(s) Patch 1 Patch Transdermal daily  sodium chloride 0.9%. 1000 milliLiter(s) (100 mL/Hr) IV Continuous <Continuous>    MEDICATIONS  (PRN):  heparin   Injectable 5500 Unit(s) IV Push every 6 hours PRN For aPTT less than 40  heparin   Injectable 2500 Unit(s) IV Push every 6 hours PRN For aPTT between 40 - 57      Vital Signs Last 24 Hrs  T(C): 36.7 (10-18-22 @ 09:25), Max: 37.2 (10-17-22 @ 15:15)  T(F): 98.1 (10-18-22 @ 09:25), Max: 98.9 (10-17-22 @ 15:15)  HR: 55 (10-18-22 @ 09:25) (55 - 81)  BP: 107/68 (10-18-22 @ 09:25) (98/65 - 148/95)  RR: 16 (10-18-22 @ 09:25) (16 - 19)  SpO2: 96% (10-18-22 @ 09:25) (94% - 100%)           Daily Height in cm: 162.56 (17 Oct 2022 12:40)    Daily   Admit Wt: Drug Dosing Weight  Height (cm): 162.6 (17 Oct 2022 12:40)  Weight (kg): 68.7 (17 Oct 2022 12:45)  BMI (kg/m2): 26 (17 Oct 2022 12:45)  BSA (m2): 1.74 (17 Oct 2022 12:45)    Allergies: No Known Allergies      SOCIAL HISTORY:  Smoker: [ x] Yes  [ ] No       1/2 pack a day   ETOH use: [ ] Yes  [x ] No              FREQUENCY / QUANTITY:  Ilicit Drug use:  [ ] Yes  [ x] No      Relevant Family History  FAMILY HISTORY:      Review of Systems  GENERAL:  no weakness, fatigue, fevers or chills  NEURO: no dizziness, numbness, tingling or weakness  SKIN: no itching, burning, rashes, or lesions   HEENT: no visual changes;  no headache, no vertigo, no recent colds  RESPIRATORY: no shortness of breath, no cough, sputum, wheezing, hemoptysis;   CARDIOVASCULAR:  +chest pain intermittent , within last year - more frequent recently   GI: no abd pain. no N/V/D.  PERIPHERAL VASCULAR: no swelling, no tenderness, no erythema, no varicose veins.     PHYSICAL EXAM  General: Well nourished, well developed, NAD.                                              Neuro: Normal exam oriented to person/place & time with no focal motor or sensory  deficits.                    Eyes: Normal exam of conjunctiva & lids, pupils equally reactive.   ENT: Normal exam of nasal/oral mucosa with absence of cyanosis.   Neck: Normal exam of jugular veins, trachea & thyroid.   Chest: Normal lung exam with good air movement absence of wheezes, rales, or rhonchi:                                                                          CV:  Auscultation: normal S1S2, Irregular   Murmurs   Carotids: No Bruits[ ]  Abdominal Aorta: normal [ ] nonpalpable[ ]                                                                         GI: Normal exam of abdomen with no noted masses or tenderness. +BSx4Q                                                                                            Extremities: Normal no evidence of cyanosis or deformity, Edema:   SKIN : Normal exam to inspection & palpation.                                                           LABS:                        16.5   7.04  )-----------( 214      ( 17 Oct 2022 13:16 )             48.1     10-17    137  |  104  |  10  ----------------------------<  98  5.5<H>   |  22  |  0.80    Ca    9.1      17 Oct 2022 13:16  Mg     2.1     10-17    TPro  7.4  /  Alb  4.2  /  TBili  0.5  /  DBili  x   /  AST  29  /  ALT  20  /  AlkPhos  55  10-17    PT/INR - ( 17 Oct 2022 13:16 )   PT: 11.5 sec;   INR: 0.99 ratio         PTT - ( 17 Oct 2022 13:16 )  PTT:33.6 sec      Cardiac Cath:    TTE / JAYNA:    Assessment:  52y Male presents with ACS (acute coronary syndrome)        Plan:             CONSULT CT SURGERY.         per HPI:   53yo M daily smoker 1ppd, [place] presents today with chest pain from [place] [facility] w abn EKG. he has Radiation to the L arm. Woke up with pain. Vomited x3. NBNB. States he has had intermittent chest pain ongoing for the past year. Has never seen a cardiologist or had EST/echo performed. EKG in the D w Anterolateral ST -T abn. going for [facility] as per cardiology team     CT Surgery consulted for CABG    Past Medical History      Past Surgical History  none    MEDICATIONS  (STANDING):  aspirin enteric coated 81 milliGRAM(s) Oral daily  atorvastatin 40 milliGRAM(s) Oral at bedtime  heparin  Infusion.  Unit(s)/Hr (12 mL/Hr) IV Continuous <Continuous>  nicotine -   7 mG/24Hr(s) Patch 1 Patch Transdermal daily  sodium chloride 0.9%. 1000 milliLiter(s) (100 mL/Hr) IV Continuous <Continuous>    MEDICATIONS  (PRN):  heparin   Injectable 5500 Unit(s) IV Push every 6 hours PRN For aPTT less than 40  heparin   Injectable 2500 Unit(s) IV Push every 6 hours PRN For aPTT between 40 - 57      Vital Signs Last 24 Hrs  T(C): 36.7 (10-18-22 @ 09:25), Max: 37.2 (10-17-22 @ 15:15)  T(F): 98.1 (10-18-22 @ 09:25), Max: 98.9 (10-17-22 @ 15:15)  HR: 55 (10-18-22 @ 09:25) (55 - 81)  BP: 107/68 (10-18-22 @ 09:25) (98/65 - 148/95)  RR: 16 (10-18-22 @ 09:25) (16 - 19)  SpO2: 96% (10-18-22 @ 09:25) (94% - 100%)           Daily Height in cm: 162.56 (17 Oct 2022 12:40)    Daily   Admit Wt: Drug Dosing Weight  Height (cm): 162.6 (17 Oct 2022 12:40)  Weight (kg): 68.7 (17 Oct 2022 12:45)  BMI (kg/m2): 26 (17 Oct 2022 12:45)  BSA (m2): 1.74 (17 Oct 2022 12:45)    Allergies: No Known Allergies      SOCIAL HISTORY:  Smoker: [ x] Yes  [ ] No       1/2 pack a day   ETOH use: [ ] Yes  [x ] No              FREQUENCY / QUANTITY:  Ilicit Drug use:  [ ] Yes  [ x] No      Relevant Family History  FAMILY HISTORY:      Review of Systems  GENERAL:  no weakness, fatigue, fevers or chills  NEURO: no dizziness, numbness, tingling or weakness  SKIN: no itching, burning, rashes, or lesions   HEENT: no visual changes;  no headache, no vertigo, no recent colds  RESPIRATORY: no shortness of breath, no cough, sputum, wheezing, hemoptysis;   CARDIOVASCULAR:  +chest pain intermittent , within last year - more frequent recently   GI: no abd pain. no N/V/D.  PERIPHERAL VASCULAR: no swelling, no tenderness, no erythema, no varicose veins.     PHYSICAL EXAM  General: Well nourished, well developed, NAD.                                              Neuro: Normal exam oriented to person/place & time with no focal motor or sensory  deficits.                    Eyes: Normal exam of conjunctiva & lids, pupils equally reactive.   ENT: Normal exam of nasal/oral mucosa with absence of cyanosis.   Neck: Normal exam of jugular veins, trachea & thyroid.   Chest: Normal lung exam with good air movement absence of wheezes, rales, or rhonchi:                                                                          CV:  Auscultation: normal S1S2, regular NO  Murmurs   Carotids: No Bruitsx[ ]  Abdominal Aorta: normal [x ] nonpalpable[x ]                                                                         GI: Normal exam of abdomen with no noted masses or tenderness. +BSx4Q                                                                                            Extremities: Normal no evidence of cyanosis or deformity, Edema: none  SKIN : Normal exam to inspection & palpation.                                                           LABS:                        16.5   7.04  )-----------( 214      ( 17 Oct 2022 13:16 )             48.1     10-17    137  |  104  |  10  ----------------------------<  98  5.5<H>   |  22  |  0.80    Ca    9.1      17 Oct 2022 13:16  Mg     2.1     10-17    TPro  7.4  /  Alb  4.2  /  TBili  0.5  /  DBili  x   /  AST  29  /  ALT  20  /  AlkPhos  55  10-17    PT/INR - ( 17 Oct 2022 13:16 )   PT: 11.5 sec;   INR: 0.99 ratio         PTT - ( 17 Oct 2022 13:16 )  PTT:33.6 sec      Cardiac Cath:          TTE / JAYNA:    Assessment:  52y Male presents with ACS (acute coronary syndrome)        Plan:             CONSULT CT SURGERY.         per HPI:   51yo M daily smoker 1ppd, [place] presents today with chest pain from [place] [facility] w abn EKG. he has Radiation to the L arm. Woke up with pain. Vomited x3. NBNB. States he has had intermittent chest pain ongoing for the past year. Has never seen a cardiologist or had EST/echo performed. EKG in the D w Anterolateral ST -T abn. going for [facility] as per cardiology team     CT Surgery consulted for CABG    Past Medical History      Past Surgical History  none    MEDICATIONS  (STANDING):  aspirin enteric coated 81 milliGRAM(s) Oral daily  atorvastatin 40 milliGRAM(s) Oral at bedtime  heparin  Infusion.  Unit(s)/Hr (12 mL/Hr) IV Continuous <Continuous>  nicotine -   7 mG/24Hr(s) Patch 1 Patch Transdermal daily  sodium chloride 0.9%. 1000 milliLiter(s) (100 mL/Hr) IV Continuous <Continuous>    MEDICATIONS  (PRN):  heparin   Injectable 5500 Unit(s) IV Push every 6 hours PRN For aPTT less than 40  heparin   Injectable 2500 Unit(s) IV Push every 6 hours PRN For aPTT between 40 - 57      Vital Signs Last 24 Hrs  T(C): 36.7 (10-18-22 @ 09:25), Max: 37.2 (10-17-22 @ 15:15)  T(F): 98.1 (10-18-22 @ 09:25), Max: 98.9 (10-17-22 @ 15:15)  HR: 55 (10-18-22 @ 09:25) (55 - 81)  BP: 107/68 (10-18-22 @ 09:25) (98/65 - 148/95)  RR: 16 (10-18-22 @ 09:25) (16 - 19)  SpO2: 96% (10-18-22 @ 09:25) (94% - 100%)           Daily Height in cm: 162.56 (17 Oct 2022 12:40)    Daily   Admit Wt: Drug Dosing Weight  Height (cm): 162.6 (17 Oct 2022 12:40)  Weight (kg): 68.7 (17 Oct 2022 12:45)  BMI (kg/m2): 26 (17 Oct 2022 12:45)  BSA (m2): 1.74 (17 Oct 2022 12:45)    Allergies: No Known Allergies      SOCIAL HISTORY:  Smoker: [ x] Yes  [ ] No       1/2 pack a day   ETOH use: [ ] Yes  [x ] No              FREQUENCY / QUANTITY:  Ilicit Drug use:  [ ] Yes  [ x] No      Relevant Family History  FAMILY HISTORY:      Review of Systems  GENERAL:  no weakness, fatigue, fevers or chills  NEURO: no dizziness, numbness, tingling or weakness  SKIN: no itching, burning, rashes, or lesions   HEENT: no visual changes;  no headache, no vertigo, no recent colds  RESPIRATORY: no shortness of breath, no cough, sputum, wheezing, hemoptysis;   CARDIOVASCULAR:  +chest pain intermittent , within last year - more frequent recently   GI: no abd pain. no N/V/D.  PERIPHERAL VASCULAR: no swelling, no tenderness, no erythema, no varicose veins.     PHYSICAL EXAM  General: Well nourished, well developed, NAD.                                              Neuro: Normal exam oriented to person/place & time with no focal motor or sensory  deficits.                    Eyes: Normal exam of conjunctiva & lids, pupils equally reactive.   ENT: Normal exam of nasal/oral mucosa with absence of cyanosis.   Neck: Normal exam of jugular veins, trachea & thyroid.   Chest: Normal lung exam with good air movement absence of wheezes, rales, or rhonchi:                                                                          CV:  Auscultation: normal S1S2, regular NO  Murmurs   Carotids: No Bruitsx[ ]  Abdominal Aorta: normal [x ] nonpalpable[x ]                                                                         GI: Normal exam of abdomen with no noted masses or tenderness. +BSx4Q                                                                                            Extremities: Normal no evidence of cyanosis or deformity, Edema: none  SKIN : Normal exam to inspection & palpation.                                                           LABS:                        16.5   7.04  )-----------( 214      ( 17 Oct 2022 13:16 )             48.1     10-17    137  |  104  |  10  ----------------------------<  98  5.5<H>   |  22  |  0.80    Ca    9.1      17 Oct 2022 13:16  Mg     2.1     10-17    TPro  7.4  /  Alb  4.2  /  TBili  0.5  /  DBili  x   /  AST  29  /  ALT  20  /  AlkPhos  55  10-17    PT/INR - ( 17 Oct 2022 13:16 )   PT: 11.5 sec;   INR: 0.99 ratio         PTT - ( 17 Oct 2022 13:16 )  PTT:33.6 sec      Cardiac Cath:  < from: Cardiac Catheterization (10.17.22 @ 16:08) >    Diagnostic Findings:     Coronary Angiography   The coronary circulation is right dominant.      LM   Left main artery: Angiography shows minor irregularities.      LAD   Mid left anterior descending: There is a 100 % stenosis.      CX   Proximal circumflex: There is a 50 % stenosis.      RCA   Proximal right coronary artery: There is a 70 % stenosis. First right  posterolateral: The segment is large. There is a 100 %  stenosis.      Patient: DIEGO READ            MRN: [id number]  Study Date: 10/17/2022   04:08 PM      Page 1 of 3          Ramus   Ramus intermedius: Angiography shows moderate atherosclerosis.      Left Heart Cath   The apical and posterobasal wall segments are hypokinetic. Ejection  fraction is 40%.    < end of copied text >          TTE / JAYNA:  < from: TTE with Doppler (w/Cont) (10.18.22 @ 07:40) >  Observations:  Mitral Valve: Normal mitral valve. Mild mitral  regurgitation.  Aortic Valve/Aorta: Normal aortic valve.  Normal aortic root size.  Left Atrium: Normal left atrium.  Left Ventricle: Endocardial visualization enhanced with  intravenous injection of Ultrasonic Enhancing Agent  (Definity).  Mild left ventricular enlargement.  Estiamted ejection fraction 45%. Large area of apical  akinesis.  An apical thrombus is present.  Right Heart: Normal right atrium. Normal right ventricular  size and function.  Normal tricuspid valve. Normal pulmonic valve.  Pericardium/Pleura: Normal pericardium with no pericardial  effusion.  Hemodynamic: Estimated right atrial pressure is normal.  No evidence of pulmonary hypertension.  ------------------------------------------------------------------------  Conclusions:  Endocardial visualization enhanced with intravenous  injection of Ultrasonic Enhancing Agent (Definity).  Mild left ventricular enlargement.  Estiamted ejection fraction 45%. Large area of apical  akinesis.  An apical thrombus is present.  ------------------------------------------------------------------------  Confirmed on  10/18/2022 - 09:41:02 by Adolph Benz MD,  NADYA  ------------------------------------------------------------------------    < end of copied text >

## 2022-10-19 LAB
ALBUMIN SERPL ELPH-MCNC: 3.6 G/DL — SIGNIFICANT CHANGE UP (ref 3.3–5)
ALP SERPL-CCNC: 47 U/L — SIGNIFICANT CHANGE UP (ref 40–120)
ALT FLD-CCNC: 15 U/L — SIGNIFICANT CHANGE UP (ref 10–45)
ANION GAP SERPL CALC-SCNC: 10 MMOL/L — SIGNIFICANT CHANGE UP (ref 5–17)
APPEARANCE UR: CLEAR — SIGNIFICANT CHANGE UP
APTT BLD: 119.7 SEC — HIGH (ref 27.5–35.5)
APTT BLD: 66.4 SEC — HIGH (ref 27.5–35.5)
AST SERPL-CCNC: 17 U/L — SIGNIFICANT CHANGE UP (ref 10–40)
BILIRUB SERPL-MCNC: 0.5 MG/DL — SIGNIFICANT CHANGE UP (ref 0.2–1.2)
BILIRUB UR-MCNC: NEGATIVE — SIGNIFICANT CHANGE UP
BUN SERPL-MCNC: 18 MG/DL — SIGNIFICANT CHANGE UP (ref 7–23)
CALCIUM SERPL-MCNC: 8.7 MG/DL — SIGNIFICANT CHANGE UP (ref 8.4–10.5)
CHLORIDE SERPL-SCNC: 103 MMOL/L — SIGNIFICANT CHANGE UP (ref 96–108)
CO2 SERPL-SCNC: 21 MMOL/L — LOW (ref 22–31)
COLOR SPEC: SIGNIFICANT CHANGE UP
CREAT SERPL-MCNC: 0.96 MG/DL — SIGNIFICANT CHANGE UP (ref 0.5–1.3)
DIFF PNL FLD: NEGATIVE — SIGNIFICANT CHANGE UP
EGFR: 95 ML/MIN/1.73M2 — SIGNIFICANT CHANGE UP
FIBRINOGEN PPP-MCNC: 381 MG/DL — SIGNIFICANT CHANGE UP (ref 330–520)
GLUCOSE SERPL-MCNC: 113 MG/DL — HIGH (ref 70–99)
GLUCOSE UR QL: NEGATIVE — SIGNIFICANT CHANGE UP
HCT VFR BLD CALC: 43.9 % — SIGNIFICANT CHANGE UP (ref 39–50)
HGB BLD-MCNC: 15.2 G/DL — SIGNIFICANT CHANGE UP (ref 13–17)
INR BLD: 1.03 RATIO — SIGNIFICANT CHANGE UP (ref 0.88–1.16)
KETONES UR-MCNC: NEGATIVE — SIGNIFICANT CHANGE UP
LEUKOCYTE ESTERASE UR-ACNC: NEGATIVE — SIGNIFICANT CHANGE UP
MAGNESIUM SERPL-MCNC: 1.9 MG/DL — SIGNIFICANT CHANGE UP (ref 1.6–2.6)
MCHC RBC-ENTMCNC: 32.3 PG — SIGNIFICANT CHANGE UP (ref 27–34)
MCHC RBC-ENTMCNC: 34.6 GM/DL — SIGNIFICANT CHANGE UP (ref 32–36)
MCV RBC AUTO: 93.2 FL — SIGNIFICANT CHANGE UP (ref 80–100)
MRSA PCR RESULT.: SIGNIFICANT CHANGE UP
NITRITE UR-MCNC: NEGATIVE — SIGNIFICANT CHANGE UP
NRBC # BLD: 0 /100 WBCS — SIGNIFICANT CHANGE UP (ref 0–0)
NT-PROBNP SERPL-SCNC: 331 PG/ML — HIGH (ref 0–300)
PA ADP PRP-ACNC: 134 PRU — LOW (ref 194–417)
PH UR: 6 — SIGNIFICANT CHANGE UP (ref 5–8)
PHOSPHATE SERPL-MCNC: 3.3 MG/DL — SIGNIFICANT CHANGE UP (ref 2.5–4.5)
PLATELET # BLD AUTO: 198 K/UL — SIGNIFICANT CHANGE UP (ref 150–400)
POTASSIUM SERPL-MCNC: 4.1 MMOL/L — SIGNIFICANT CHANGE UP (ref 3.5–5.3)
POTASSIUM SERPL-SCNC: 4.1 MMOL/L — SIGNIFICANT CHANGE UP (ref 3.5–5.3)
PROT SERPL-MCNC: 6.1 G/DL — SIGNIFICANT CHANGE UP (ref 6–8.3)
PROT UR-MCNC: NEGATIVE — SIGNIFICANT CHANGE UP
PROTHROM AB SERPL-ACNC: 11.8 SEC — SIGNIFICANT CHANGE UP (ref 10.5–13.4)
RBC # BLD: 4.71 M/UL — SIGNIFICANT CHANGE UP (ref 4.2–5.8)
RBC # FLD: 11.9 % — SIGNIFICANT CHANGE UP (ref 10.3–14.5)
S AUREUS DNA NOSE QL NAA+PROBE: SIGNIFICANT CHANGE UP
SODIUM SERPL-SCNC: 134 MMOL/L — LOW (ref 135–145)
SP GR SPEC: 1.01 — SIGNIFICANT CHANGE UP (ref 1.01–1.02)
T3 SERPL-MCNC: 92 NG/DL — SIGNIFICANT CHANGE UP (ref 80–200)
T4 AB SER-ACNC: 6.8 UG/DL — SIGNIFICANT CHANGE UP (ref 4.6–12)
TSH SERPL-MCNC: 2.55 UIU/ML — SIGNIFICANT CHANGE UP (ref 0.27–4.2)
UROBILINOGEN FLD QL: NEGATIVE — SIGNIFICANT CHANGE UP
WBC # BLD: 7.27 K/UL — SIGNIFICANT CHANGE UP (ref 3.8–10.5)
WBC # FLD AUTO: 7.27 K/UL — SIGNIFICANT CHANGE UP (ref 3.8–10.5)

## 2022-10-19 PROCEDURE — 75561 CARDIAC MRI FOR MORPH W/DYE: CPT | Mod: 26

## 2022-10-19 PROCEDURE — 99233 SBSQ HOSP IP/OBS HIGH 50: CPT | Mod: GC

## 2022-10-19 PROCEDURE — 93880 EXTRACRANIAL BILAT STUDY: CPT | Mod: 26

## 2022-10-19 PROCEDURE — 94010 BREATHING CAPACITY TEST: CPT | Mod: 26

## 2022-10-19 RX ADMIN — Medication 1 PATCH: at 12:10

## 2022-10-19 RX ADMIN — Medication 81 MILLIGRAM(S): at 04:52

## 2022-10-19 RX ADMIN — ATORVASTATIN CALCIUM 40 MILLIGRAM(S): 80 TABLET, FILM COATED ORAL at 22:05

## 2022-10-19 RX ADMIN — Medication 1 PATCH: at 08:43

## 2022-10-19 RX ADMIN — HEPARIN SODIUM 1200 UNIT(S)/HR: 5000 INJECTION INTRAVENOUS; SUBCUTANEOUS at 00:30

## 2022-10-19 RX ADMIN — HEPARIN SODIUM 1200 UNIT(S)/HR: 5000 INJECTION INTRAVENOUS; SUBCUTANEOUS at 22:05

## 2022-10-19 RX ADMIN — Medication 1 PATCH: at 19:09

## 2022-10-19 NOTE — PROGRESS NOTE ADULT - SUBJECTIVE AND OBJECTIVE BOX
Patient seen and examined at bedside.    Overnight Events: No acute events overnight. Denies chest pain or SOB      REVIEW OF SYSTEMS:  Constitutional:     [ x] negative [ ] fevers [ ] chills [ ] weight loss [ ] weight gain  HEENT:                  [ x] negative [ ] dry eyes [ ] eye irritation [ ] postnasal drip [ ] nasal congestion  CV:                         [x ] negative  [ ] chest pain [ ] orthopnea [ ] palpitations [ ] murmur  Resp:                     [ x] negative [ ] cough [ ] shortness of breath [ ] dyspnea [ ] wheezing [ ] sputum [ ]hemoptysis  GI:                          [ x] negative [ ] nausea [ ] vomiting [ ] diarrhea [ ] constipation [ ] abd pain [ ] dysphagia   :                        [ x] negative [ ] dysuria [ ] nocturia [ ] hematuria [ ] increased urinary frequency  Musculoskeletal: [ x] negative [ ] back pain [ ] myalgias [ ] arthralgias [ ] fracture  Skin:                       [ x] negative [ ] rash [ ] itch  Neurological:        [ x] negative [ ] headache [ ] dizziness [ ] syncope [ ] weakness [ ] numbness  Psychiatric:           [ x] negative [ ] anxiety [ ] depression  Endocrine:            [ x] negative [ ] diabetes [ ] thyroid problem  Heme/Lymph:      [ x] negative [ ] anemia [ ] bleeding problem  Allergic/Immune: [x ] negative [ ] itchy eyes [ ] nasal discharge [ ] hives [ ] angioedema    [x ] All other systems negative  [ ] Unable to assess ROS due to    Current Meds:  aspirin enteric coated 81 milliGRAM(s) Oral daily  atorvastatin 40 milliGRAM(s) Oral at bedtime  heparin   Injectable 5500 Unit(s) IV Push every 6 hours PRN  heparin   Injectable 2500 Unit(s) IV Push every 6 hours PRN  heparin  Infusion.  Unit(s)/Hr IV Continuous <Continuous>  nicotine -   7 mG/24Hr(s) Patch 1 Patch Transdermal daily  sodium chloride 0.9%. 1000 milliLiter(s) IV Continuous <Continuous>      PAST MEDICAL & SURGICAL HISTORY:      Vitals:  T(F): 98 (10-19), Max: 98.3 (10-18)  HR: 92 (10-19) (55 - 92)  BP: 125/68 (10-19) (104/64 - 125/68)  RR: 16 (10-19)  SpO2: 97% (10-19)  I&O's Summary    17 Oct 2022 07:01  -  18 Oct 2022 07:00  --------------------------------------------------------  IN: 240 mL / OUT: 300 mL / NET: -60 mL    18 Oct 2022 07:01  -  19 Oct 2022 05:14  --------------------------------------------------------  IN: 860 mL / OUT: 450 mL / NET: 410 mL        Physical Exam:  Appearance: No acute distress; well appearing  Eyes: PERRL, EOMI, pink conjunctiva  HENT: Normal oral mucosa  Cardiovascular: RRR, S1, S2, no murmurs, rubs, or gallops; no edema; no JVD  Respiratory: Clear to auscultation bilaterally  Gastrointestinal: soft, non-tender, non-distended with normal bowel sounds  Musculoskeletal: No clubbing; no joint deformity   Neurologic: Non-focal  Lymphatic: No lymphadenopathy  Psychiatry: AAOx3, mood & affect appropriate  Skin: No rashes, ecchymoses, or cyanosis                          15.5   7.31  )-----------( 218      ( 18 Oct 2022 16:37 )             45.5     10-17    137  |  104  |  10  ----------------------------<  98  5.5<H>   |  22  |  0.80    Ca    9.1      17 Oct 2022 13:16  Mg     2.1     10-17    TPro  7.4  /  Alb  4.2  /  TBili  0.5  /  DBili  x   /  AST  29  /  ALT  20  /  AlkPhos  55  10-17    PT/INR - ( 17 Oct 2022 13:16 )   PT: 11.5 sec;   INR: 0.99 ratio         PTT - ( 18 Oct 2022 23:10 )  PTT:66.4 sec      Serum Pro-Brain Natriuretic Peptide: 690 pg/mL (10-17 @ 13:16)             Patient seen and examined at bedside.    Overnight Events: No acute events overnight. Denies chest pain or SOB      REVIEW OF SYSTEMS:  Constitutional:     [ x] negative [ ] fevers [ ] chills [ ] weight loss [ ] weight gain  HEENT:                  [ x] negative [ ] dry eyes [ ] eye irritation [ ] postnasal drip [ ] nasal congestion  CV:                         [x ] negative  [ ] chest pain [ ] orthopnea [ ] palpitations [ ] murmur  Resp:                     [ x] negative [ ] cough [ ] shortness of breath [ ] dyspnea [ ] wheezing [ ] sputum [ ]hemoptysis  GI:                          [ x] negative [ ] nausea [ ] vomiting [ ] diarrhea [ ] constipation [ ] abd pain [ ] dysphagia   :                        [ x] negative [ ] dysuria [ ] nocturia [ ] hematuria [ ] increased urinary frequency  Musculoskeletal: [ x] negative [ ] back pain [ ] myalgias [ ] arthralgias [ ] fracture  Skin:                       [ x] negative [ ] rash [ ] itch  Neurological:        [ x] negative [ ] headache [ ] dizziness [ ] syncope [ ] weakness [ ] numbness  Psychiatric:           [ x] negative [ ] anxiety [ ] depression  Endocrine:            [ x] negative [ ] diabetes [ ] thyroid problem  Heme/Lymph:      [ x] negative [ ] anemia [ ] bleeding problem  Allergic/Immune: [x ] negative [ ] itchy eyes [ ] nasal discharge [ ] hives [ ] angioedema    [x ] All other systems negative  [ ] Unable to assess ROS due to    Current Meds:  aspirin enteric coated 81 milliGRAM(s) Oral daily  atorvastatin 40 milliGRAM(s) Oral at bedtime  heparin   Injectable 5500 Unit(s) IV Push every 6 hours PRN  heparin   Injectable 2500 Unit(s) IV Push every 6 hours PRN  heparin  Infusion.  Unit(s)/Hr IV Continuous <Continuous>  nicotine -   7 mG/24Hr(s) Patch 1 Patch Transdermal daily  sodium chloride 0.9%. 1000 milliLiter(s) IV Continuous <Continuous>      PAST MEDICAL & SURGICAL HISTORY:      Vitals:  T(F): 98 (10-19), Max: 98.3 (10-18)  HR: 92 (10-19) (55 - 92)  BP: 125/68 (10-19) (104/64 - 125/68)  RR: 16 (10-19)  SpO2: 97% (10-19)  I&O's Summary    17 Oct 2022 07:01  -  18 Oct 2022 07:00  --------------------------------------------------------  IN: 240 mL / OUT: 300 mL / NET: -60 mL    18 Oct 2022 07:01  -  19 Oct 2022 05:14  --------------------------------------------------------  IN: 860 mL / OUT: 450 mL / NET: 410 mL        Physical Exam:  Appearance: No acute distress; well appearing  Eyes: PERRL, EOMI, pink conjunctiva  HENT: Normal oral mucosa  Cardiovascular: RRR, S1, S2, no murmurs, rubs, or gallops; no edema; no JVD  Respiratory: Clear to auscultation bilaterally  Gastrointestinal: soft, non-tender, non-distended with normal bowel sounds  Musculoskeletal: No clubbing; no joint deformity   Neurologic: Non-focal  Lymphatic: No lymphadenopathy  Psychiatry: AAOx3, mood & affect appropriate  Skin: No rashes, ecchymoses, or cyanosis                          15.5   7.31  )-----------( 218      ( 18 Oct 2022 16:37 )             45.5     10-17    137  |  104  |  10  ----------------------------<  98  5.5<H>   |  22  |  0.80    Ca    9.1      17 Oct 2022 13:16  Mg     2.1     10-17    TPro  7.4  /  Alb  4.2  /  TBili  0.5  /  DBili  x   /  AST  29  /  ALT  20  /  AlkPhos  55  10-17    PT/INR - ( 17 Oct 2022 13:16 )   PT: 11.5 sec;   INR: 0.99 ratio         PTT - ( 18 Oct 2022 23:10 )  PTT:66.4 sec      Serum Pro-Brain Natriuretic Peptide: 690 pg/mL (10-17 @ 13:16)    Dimensions:    Normal Values:  LA:     4.4    2.0 - 4.0 cm  Ao:     3.4    2.0 - 3.8 cm  SEPTUM: 0.9    0.6 - 1.2 cm  PWT:    0.9    0.6 - 1.1 cm  LVIDd:  5.7    3.0 - 5.6 cm  LVIDs:  4.2    1.8 - 4.0 cm  Derived variables:  LVMI: 113 g/m2  RWT: 0.31  EF (Visual Estimate): 45 %  Doppler Peak Velocity (m/sec): AoV=1.2  ------------------------------------------------------------------------  Observations:  Mitral Valve: Normal mitral valve. Mild mitral  regurgitation.  Aortic Valve/Aorta: Normal aortic valve.  Normal aortic root size.  Left Atrium: Normal left atrium.  Left Ventricle: Endocardial visualization enhanced with  intravenous injection of Ultrasonic Enhancing Agent  (Definity).  Mild left ventricular enlargement.  Estiamted ejection fraction 45%. Large area of apical  akinesis.  An apical thrombus is present.  Right Heart: Normal right atrium. Normal right ventricular  size and function.  Normal tricuspid valve. Normal pulmonic valve.  Pericardium/Pleura: Normal pericardium with no pericardial  effusion.  Hemodynamic: Estimated right atrial pressure is normal.  No evidence of pulmonary hypertension.  ------------------------------------------------------------------------  Conclusions:  Endocardial visualization enhanced with intravenous  injection of Ultrasonic Enhancing Agent (Definity).  Mild left ventricular enlargement.  Estiamted ejection fraction 45%. Large area of apical  akinesis.  An apical thrombus is present.

## 2022-10-19 NOTE — PROGRESS NOTE ADULT - SUBJECTIVE AND OBJECTIVE BOX
Patient is a 52y old  Male who presents with a chief complaint of acute MI, NSTEMI (19 Oct 2022 05:14)      SUBJECTIVE / OVERNIGHT EVENTS:    MEDICATIONS  (STANDING):  aspirin enteric coated 81 milliGRAM(s) Oral daily  atorvastatin 40 milliGRAM(s) Oral at bedtime  heparin  Infusion.  Unit(s)/Hr (12 mL/Hr) IV Continuous <Continuous>  nicotine -   7 mG/24Hr(s) Patch 1 Patch Transdermal daily  sodium chloride 0.9%. 1000 milliLiter(s) (100 mL/Hr) IV Continuous <Continuous>    MEDICATIONS  (PRN):  heparin   Injectable 5500 Unit(s) IV Push every 6 hours PRN For aPTT less than 40  heparin   Injectable 2500 Unit(s) IV Push every 6 hours PRN For aPTT between 40 - 57      Vital Signs Last 24 Hrs  T(F): 98 (10-19-22 @ 04:28), Max: 98.3 (10-18-22 @ 21:30)  HR: 55 (10-19-22 @ 10:35) (55 - 92)  BP: 129/75 (10-19-22 @ 10:35) (104/64 - 129/75)  RR: 17 (10-19-22 @ 10:35) (16 - 17)  SpO2: 99% (10-19-22 @ 10:35) (96% - 99%)  Telemetry:   CAPILLARY BLOOD GLUCOSE        I&O's Summary    18 Oct 2022 07:  -  19 Oct 2022 07:00  --------------------------------------------------------  IN: 860 mL / OUT: 450 mL / NET: 410 mL    19 Oct 2022 07:  -  19 Oct 2022 13:23  --------------------------------------------------------  IN: 480 mL / OUT: 0 mL / NET: 480 mL        PHYSICAL EXAM:  GENERAL: NAD, well-developed  HEAD:  Atraumatic, Normocephalic  EYES: EOMI, PERRLA, conjunctiva and sclera clear  NECK: Supple, No JVD  CHEST/LUNG: Clear to auscultation bilaterally; No wheeze  HEART: Regular rate and rhythm; No murmurs, rubs, or gallops  ABDOMEN: Soft, Nontender, Nondistended; Bowel sounds present  EXTREMITIES:  2+ Peripheral Pulses, No clubbing, cyanosis, or edema  PSYCH: AAOx3  NEUROLOGY: non-focal  SKIN: No rashes or lesions    LABS:                        15.2   7.27  )-----------( 198      ( 19 Oct 2022 05:39 )             43.9     10-19    134<L>  |  103  |  18  ----------------------------<  113<H>  4.1   |  21<L>  |  0.96    Ca    8.7      19 Oct 2022 05:39  Phos  3.3     10-19  Mg     1.9     10-19    TPro  6.1  /  Alb  3.6  /  TBili  0.5  /  DBili  x   /  AST  17  /  ALT  15  /  AlkPhos  47  10-19    PT/INR - ( 19 Oct 2022 05:39 )   PT: 11.8 sec;   INR: 1.03 ratio         PTT - ( 19 Oct 2022 05:39 )  PTT:119.7 sec      Urinalysis Basic - ( 19 Oct 2022 01:35 )    Color: Light Yellow / Appearance: Clear / S.013 / pH: x  Gluc: x / Ketone: Negative  / Bili: Negative / Urobili: Negative   Blood: x / Protein: Negative / Nitrite: Negative   Leuk Esterase: Negative / RBC: x / WBC x   Sq Epi: x / Non Sq Epi: x / Bacteria: x

## 2022-10-19 NOTE — PROGRESS NOTE ADULT - ASSESSMENT
Patient is a 52 year old [place] male, current 0.5 ppd smoker, with no know significant past medical history who presented to the ED at [facility] after being transferred from [facility] with abnormal ECG findings. The patient presented to [facility] earlier today with a 1 year history of exertional chest pain, consistent with typical angina, worse in the last 24 hours. Troponin T at [facility] WNL, hsTroponin ~2 hours later on admission labs WNL. On arrival ECG demonstrated ST segmental elevations in anterolateral leads with Q waves suggestive of prior infarct with ongoing symptoms. ECG did not meet criteria for STEMI. He was given brilenta 180 mg PO, loaded with IV heparin and started on heparin ggt. Patient was consented for cardiac catheterization. Patient to be admitted for management of NSTEMI.  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

## 2022-10-19 NOTE — PROGRESS NOTE ADULT - ASSESSMENT
52 year old [place] male, current 0.5 ppd smoker, with no know significant past medical history who presented to the ED at [facility] after being transferred from [facility] with abnormal ECG findings. The patient presented to [facility] earlier today with a 1 year history of exertional chest pain, consistent with typical angina, worse in the last 24 hours. Troponin T at [facility] WNL, hsTroponin ~2 hours later on admission labs WNL. On arrival ECG demonstrated ST segmental elevations in anterolateral leads with Q waves suggestive of prior infarct with ongoing symptoms. ECG did not meet criteria for STEMI. He was given brilenta 180 mg PO, loaded with IV heparin and started on heparin ggt. Patient was consented for cardiac catheterization. Patient to be admitted for management of NSTEMI.    #NSTEMI  #Triple vessel CAD: C showing prox LAD , mid Cx 50%, prox RCA 40%, RPL    - CT Surgery evaluation ongoing: cMRI viability study pending  - continue ASA 81 qd, hold Brilinta pending CT Surg eval  - continue Atorvastatin 40 qHS    #iCM/HFrEF (EF 45%):  - will discuss GDMT    #Apical Thrombus  - apical thrombus noted on TTE: continue heparin gtt

## 2022-10-20 PROBLEM — Z00.00 ENCOUNTER FOR PREVENTIVE HEALTH EXAMINATION: Status: ACTIVE | Noted: 2022-10-20

## 2022-10-20 LAB
APTT BLD: 78.9 SEC — HIGH (ref 27.5–35.5)
APTT BLD: 86.2 SEC — HIGH (ref 27.5–35.5)

## 2022-10-20 PROCEDURE — 99233 SBSQ HOSP IP/OBS HIGH 50: CPT | Mod: GC

## 2022-10-20 RX ADMIN — ATORVASTATIN CALCIUM 40 MILLIGRAM(S): 80 TABLET, FILM COATED ORAL at 21:55

## 2022-10-20 RX ADMIN — Medication 81 MILLIGRAM(S): at 12:09

## 2022-10-20 RX ADMIN — HEPARIN SODIUM 1200 UNIT(S)/HR: 5000 INJECTION INTRAVENOUS; SUBCUTANEOUS at 09:22

## 2022-10-20 RX ADMIN — Medication 1 PATCH: at 12:08

## 2022-10-20 RX ADMIN — Medication 1 PATCH: at 23:00

## 2022-10-20 RX ADMIN — Medication 1 PATCH: at 12:00

## 2022-10-20 RX ADMIN — HEPARIN SODIUM 1200 UNIT(S)/HR: 5000 INJECTION INTRAVENOUS; SUBCUTANEOUS at 21:55

## 2022-10-20 NOTE — PROGRESS NOTE ADULT - SUBJECTIVE AND OBJECTIVE BOX
Patient seen and examined at bedside.    Overnight Events: No acute events overnight. Denies chest pain or SOB      REVIEW OF SYSTEMS:  Constitutional:     [ x] negative [ ] fevers [ ] chills [ ] weight loss [ ] weight gain  HEENT:                  [ x] negative [ ] dry eyes [ ] eye irritation [ ] postnasal drip [ ] nasal congestion  CV:                         [x ] negative  [ ] chest pain [ ] orthopnea [ ] palpitations [ ] murmur  Resp:                     [ x] negative [ ] cough [ ] shortness of breath [ ] dyspnea [ ] wheezing [ ] sputum [ ]hemoptysis  GI:                          [ x] negative [ ] nausea [ ] vomiting [ ] diarrhea [ ] constipation [ ] abd pain [ ] dysphagia   :                        [ x] negative [ ] dysuria [ ] nocturia [ ] hematuria [ ] increased urinary frequency  Musculoskeletal: [ x] negative [ ] back pain [ ] myalgias [ ] arthralgias [ ] fracture  Skin:                       [ x] negative [ ] rash [ ] itch  Neurological:        [ x] negative [ ] headache [ ] dizziness [ ] syncope [ ] weakness [ ] numbness  Psychiatric:           [ x] negative [ ] anxiety [ ] depression  Endocrine:            [ x] negative [ ] diabetes [ ] thyroid problem  Heme/Lymph:      [ x] negative [ ] anemia [ ] bleeding problem  Allergic/Immune: [x ] negative [ ] itchy eyes [ ] nasal discharge [ ] hives [ ] angioedema    [x ] All other systems negative  [ ] Unable to assess ROS due to    Current Meds:  aspirin enteric coated 81 milliGRAM(s) Oral daily  atorvastatin 40 milliGRAM(s) Oral at bedtime  heparin   Injectable 5500 Unit(s) IV Push every 6 hours PRN  heparin   Injectable 2500 Unit(s) IV Push every 6 hours PRN  heparin  Infusion.  Unit(s)/Hr IV Continuous <Continuous>  nicotine -   7 mG/24Hr(s) Patch 1 Patch Transdermal daily  sodium chloride 0.9%. 1000 milliLiter(s) IV Continuous <Continuous>      PAST MEDICAL & SURGICAL HISTORY:      Vitals:  T(F): 97.8 (10-20), Max: 98.3 (10-19)  HR: 65 (10-20) (55 - 75)  BP: 100/64 (10-20) (100/64 - 129/75)  RR: 18 (10-20)  SpO2: 99% (10-20)  I&O's Summary    18 Oct 2022 07:01  -  19 Oct 2022 07:00  --------------------------------------------------------  IN: 860 mL / OUT: 450 mL / NET: 410 mL    19 Oct 2022 07:01  -  20 Oct 2022 05:06  --------------------------------------------------------  IN: 720 mL / OUT: 0 mL / NET: 720 mL        Physical Exam:  Appearance: No acute distress; well appearing  Eyes: PERRL, EOMI, pink conjunctiva  HENT: Normal oral mucosa  Cardiovascular: RRR, S1, S2, no murmurs, rubs, or gallops; no edema; no JVD  Respiratory: Clear to auscultation bilaterally  Gastrointestinal: soft, non-tender, non-distended with normal bowel sounds  Musculoskeletal: No clubbing; no joint deformity   Neurologic: Non-focal  Lymphatic: No lymphadenopathy  Psychiatry: AAOx3, mood & affect appropriate  Skin: No rashes, ecchymoses, or cyanosis                          15.2   7.27  )-----------( 198      ( 19 Oct 2022 05:39 )             43.9     10-19    134<L>  |  103  |  18  ----------------------------<  113<H>  4.1   |  21<L>  |  0.96    Ca    8.7      19 Oct 2022 05:39  Phos  3.3     10-19  Mg     1.9     10-19    TPro  6.1  /  Alb  3.6  /  TBili  0.5  /  DBili  x   /  AST  17  /  ALT  15  /  AlkPhos  47  10-19    PT/INR - ( 19 Oct 2022 05:39 )   PT: 11.8 sec;   INR: 1.03 ratio         PTT - ( 19 Oct 2022 05:39 )  PTT:119.7 sec      Serum Pro-Brain Natriuretic Peptide: 331 pg/mL (10-19 @ 05:39)  Serum Pro-Brain Natriuretic Peptide: 690 pg/mL (10-17 @ 13:16)             Patient seen and examined at bedside.    Overnight Events: No acute events overnight. Denies chest pain or SOB      REVIEW OF SYSTEMS:  Constitutional:     [ x] negative [ ] fevers [ ] chills [ ] weight loss [ ] weight gain  HEENT:                  [ x] negative [ ] dry eyes [ ] eye irritation [ ] postnasal drip [ ] nasal congestion  CV:                         [x ] negative  [ ] chest pain [ ] orthopnea [ ] palpitations [ ] murmur  Resp:                     [ x] negative [ ] cough [ ] shortness of breath [ ] dyspnea [ ] wheezing [ ] sputum [ ]hemoptysis  GI:                          [ x] negative [ ] nausea [ ] vomiting [ ] diarrhea [ ] constipation [ ] abd pain [ ] dysphagia   :                        [ x] negative [ ] dysuria [ ] nocturia [ ] hematuria [ ] increased urinary frequency  Musculoskeletal: [ x] negative [ ] back pain [ ] myalgias [ ] arthralgias [ ] fracture  Skin:                       [ x] negative [ ] rash [ ] itch  Neurological:        [ x] negative [ ] headache [ ] dizziness [ ] syncope [ ] weakness [ ] numbness  Psychiatric:           [ x] negative [ ] anxiety [ ] depression  Endocrine:            [ x] negative [ ] diabetes [ ] thyroid problem  Heme/Lymph:      [ x] negative [ ] anemia [ ] bleeding problem  Allergic/Immune: [x ] negative [ ] itchy eyes [ ] nasal discharge [ ] hives [ ] angioedema    [x ] All other systems negative  [ ] Unable to assess ROS due to    Current Meds:  aspirin enteric coated 81 milliGRAM(s) Oral daily  atorvastatin 40 milliGRAM(s) Oral at bedtime  heparin   Injectable 5500 Unit(s) IV Push every 6 hours PRN  heparin   Injectable 2500 Unit(s) IV Push every 6 hours PRN  heparin  Infusion.  Unit(s)/Hr IV Continuous <Continuous>  nicotine -   7 mG/24Hr(s) Patch 1 Patch Transdermal daily  sodium chloride 0.9%. 1000 milliLiter(s) IV Continuous <Continuous>      PAST MEDICAL & SURGICAL HISTORY:      Vitals:  T(F): 97.8 (10-20), Max: 98.3 (10-19)  HR: 65 (10-20) (55 - 75)  BP: 100/64 (10-20) (100/64 - 129/75)  RR: 18 (10-20)  SpO2: 99% (10-20)  I&O's Summary    18 Oct 2022 07:01  -  19 Oct 2022 07:00  --------------------------------------------------------  IN: 860 mL / OUT: 450 mL / NET: 410 mL    19 Oct 2022 07:01  -  20 Oct 2022 05:06  --------------------------------------------------------  IN: 720 mL / OUT: 0 mL / NET: 720 mL        Physical Exam:  Appearance: No acute distress; well appearing  Eyes: PERRL, EOMI, pink conjunctiva  HENT: Normal oral mucosa  Cardiovascular: RRR, S1, S2, no murmurs, rubs, or gallops; no edema; no JVD  Respiratory: Clear to auscultation bilaterally  Gastrointestinal: soft, non-tender, non-distended with normal bowel sounds  Musculoskeletal: No clubbing; no joint deformity   Neurologic: Non-focal  Lymphatic: No lymphadenopathy  Psychiatry: AAOx3, mood & affect appropriate  Skin: No rashes, ecchymoses, or cyanosis                          15.2   7.27  )-----------( 198      ( 19 Oct 2022 05:39 )             43.9     10-19    134<L>  |  103  |  18  ----------------------------<  113<H>  4.1   |  21<L>  |  0.96    Ca    8.7      19 Oct 2022 05:39  Phos  3.3     10-19  Mg     1.9     10-19    TPro  6.1  /  Alb  3.6  /  TBili  0.5  /  DBili  x   /  AST  17  /  ALT  15  /  AlkPhos  47  10-19    PT/INR - ( 19 Oct 2022 05:39 )   PT: 11.8 sec;   INR: 1.03 ratio         PTT - ( 19 Oct 2022 05:39 )  PTT:119.7 sec      Serum Pro-Brain Natriuretic Peptide: 331 pg/mL (10-19 @ 05:39)  Serum Pro-Brain Natriuretic Peptide: 690 pg/mL (10-17 @ 13:16)      cMRI:  IMPRESSION:  1. Normal left ventricular size. Mildly depressed left ventricular   function. LVEF: 45%. Findings compatible with ischemic cardiomyopathy   with LAD and RPL territory infarct. Late gadolinium enhancement pattern   suggests viable myocardium in the basal to mid septum, although nonviable   myocardium at the apex, which is akinetic and thinned with a 1.7 cm   apical thrombus. Unlikely viability in the basal inferolateral wall.    2. Normal right ventricular size and function. RVEF: 68%.    3. No significant valvular abnormalities.    4. Normal atrial size.    [facility]  Diagnostic Conclusions:   LAD and RPL CTOs. Moderate Lcx disease. EF 40%   Recommendations:       TTE  Dimensions:    Normal Values:  LA:     4.4    2.0 - 4.0 cm  Ao:     3.4    2.0 - 3.8 cm  SEPTUM: 0.9    0.6 - 1.2 cm  PWT:    0.9    0.6 - 1.1 cm  LVIDd:  5.7    3.0 - 5.6 cm  LVIDs:  4.2    1.8 - 4.0 cm  Derived variables:  LVMI: 113 g/m2  RWT: 0.31  EF (Visual Estimate): 45 %  Doppler Peak Velocity (m/sec): AoV=1.2  ------------------------------------------------------------------------  Observations:  Mitral Valve: Normal mitral valve. Mild mitral  regurgitation.  Aortic Valve/Aorta: Normal aortic valve.  Normal aortic root size.  Left Atrium: Normal left atrium.  Left Ventricle: Endocardial visualization enhanced with  intravenous injection of Ultrasonic Enhancing Agent  (Definity).  Mild left ventricular enlargement.  Estiamted ejection fraction 45%. Large area of apical  akinesis.  An apical thrombus is present.  Right Heart: Normal right atrium. Normal right ventricular  size and function.  Normal tricuspid valve. Normal pulmonic valve.  Pericardium/Pleura: Normal pericardium with no pericardial  effusion.  Hemodynamic: Estimated right atrial pressure is normal.  No evidence of pulmonary hypertension.  ------------------------------------------------------------------------  Conclusions:  Endocardial visualization enhanced with intravenous  injection of Ultrasonic Enhancing Agent (Definity).  Mild left ventricular enlargement.  Estiamted ejection fraction 45%. Large area of apical  akinesis.  An apical thrombus is present.           Patient seen and examined at bedside.    Overnight Events: No acute events overnight. Denies chest pain or SOB. Awaiting CT Surg input.      REVIEW OF SYSTEMS:  Constitutional:     [ x] negative [ ] fevers [ ] chills [ ] weight loss [ ] weight gain  HEENT:                  [ x] negative [ ] dry eyes [ ] eye irritation [ ] postnasal drip [ ] nasal congestion  CV:                         [x ] negative  [ ] chest pain [ ] orthopnea [ ] palpitations [ ] murmur  Resp:                     [ x] negative [ ] cough [ ] shortness of breath [ ] dyspnea [ ] wheezing [ ] sputum [ ]hemoptysis  GI:                          [ x] negative [ ] nausea [ ] vomiting [ ] diarrhea [ ] constipation [ ] abd pain [ ] dysphagia   :                        [ x] negative [ ] dysuria [ ] nocturia [ ] hematuria [ ] increased urinary frequency  Musculoskeletal: [ x] negative [ ] back pain [ ] myalgias [ ] arthralgias [ ] fracture  Skin:                       [ x] negative [ ] rash [ ] itch  Neurological:        [ x] negative [ ] headache [ ] dizziness [ ] syncope [ ] weakness [ ] numbness  Psychiatric:           [ x] negative [ ] anxiety [ ] depression  Endocrine:            [ x] negative [ ] diabetes [ ] thyroid problem  Heme/Lymph:      [ x] negative [ ] anemia [ ] bleeding problem  Allergic/Immune: [x ] negative [ ] itchy eyes [ ] nasal discharge [ ] hives [ ] angioedema    [x ] All other systems negative  [ ] Unable to assess ROS due to    Current Meds:  aspirin enteric coated 81 milliGRAM(s) Oral daily  atorvastatin 40 milliGRAM(s) Oral at bedtime  heparin   Injectable 5500 Unit(s) IV Push every 6 hours PRN  heparin   Injectable 2500 Unit(s) IV Push every 6 hours PRN  heparin  Infusion.  Unit(s)/Hr IV Continuous <Continuous>  nicotine -   7 mG/24Hr(s) Patch 1 Patch Transdermal daily  sodium chloride 0.9%. 1000 milliLiter(s) IV Continuous <Continuous>      PAST MEDICAL & SURGICAL HISTORY:      Vitals:  T(F): 97.8 (10-20), Max: 98.3 (10-19)  HR: 65 (10-20) (55 - 75)  BP: 100/64 (10-20) (100/64 - 129/75)  RR: 18 (10-20)  SpO2: 99% (10-20)  I&O's Summary    18 Oct 2022 07:01  -  19 Oct 2022 07:00  --------------------------------------------------------  IN: 860 mL / OUT: 450 mL / NET: 410 mL    19 Oct 2022 07:01  -  20 Oct 2022 05:06  --------------------------------------------------------  IN: 720 mL / OUT: 0 mL / NET: 720 mL        Physical Exam:  Appearance: No acute distress; well appearing  Eyes: PERRL, EOMI, pink conjunctiva  HENT: Normal oral mucosa  Cardiovascular: RRR, S1, S2, no murmurs, rubs, or gallops; no edema; no JVD  Respiratory: Clear to auscultation bilaterally  Gastrointestinal: soft, non-tender, non-distended with normal bowel sounds  Musculoskeletal: No clubbing; no joint deformity   Neurologic: Non-focal  Lymphatic: No lymphadenopathy  Psychiatry: AAOx3, mood & affect appropriate  Skin: No rashes, ecchymoses, or cyanosis                          15.2   7.27  )-----------( 198      ( 19 Oct 2022 05:39 )             43.9     10-19    134<L>  |  103  |  18  ----------------------------<  113<H>  4.1   |  21<L>  |  0.96    Ca    8.7      19 Oct 2022 05:39  Phos  3.3     10-19  Mg     1.9     10-19    TPro  6.1  /  Alb  3.6  /  TBili  0.5  /  DBili  x   /  AST  17  /  ALT  15  /  AlkPhos  47  10-19    PT/INR - ( 19 Oct 2022 05:39 )   PT: 11.8 sec;   INR: 1.03 ratio         PTT - ( 19 Oct 2022 05:39 )  PTT:119.7 sec      Serum Pro-Brain Natriuretic Peptide: 331 pg/mL (10-19 @ 05:39)  Serum Pro-Brain Natriuretic Peptide: 690 pg/mL (10-17 @ 13:16)      cMRI:  IMPRESSION:  1. Normal left ventricular size. Mildly depressed left ventricular   function. LVEF: 45%. Findings compatible with ischemic cardiomyopathy   with LAD and RPL territory infarct. Late gadolinium enhancement pattern   suggests viable myocardium in the basal to mid septum, although nonviable   myocardium at the apex, which is akinetic and thinned with a 1.7 cm   apical thrombus. Unlikely viability in the basal inferolateral wall.    2. Normal right ventricular size and function. RVEF: 68%.    3. No significant valvular abnormalities.    4. Normal atrial size.    [facility]  Diagnostic Conclusions:   LAD and RPL CTOs. Moderate Lcx disease. EF 40%   Recommendations:       TTE  Dimensions:    Normal Values:  LA:     4.4    2.0 - 4.0 cm  Ao:     3.4    2.0 - 3.8 cm  SEPTUM: 0.9    0.6 - 1.2 cm  PWT:    0.9    0.6 - 1.1 cm  LVIDd:  5.7    3.0 - 5.6 cm  LVIDs:  4.2    1.8 - 4.0 cm  Derived variables:  LVMI: 113 g/m2  RWT: 0.31  EF (Visual Estimate): 45 %  Doppler Peak Velocity (m/sec): AoV=1.2  ------------------------------------------------------------------------  Observations:  Mitral Valve: Normal mitral valve. Mild mitral  regurgitation.  Aortic Valve/Aorta: Normal aortic valve.  Normal aortic root size.  Left Atrium: Normal left atrium.  Left Ventricle: Endocardial visualization enhanced with  intravenous injection of Ultrasonic Enhancing Agent  (Definity).  Mild left ventricular enlargement.  Estiamted ejection fraction 45%. Large area of apical  akinesis.  An apical thrombus is present.  Right Heart: Normal right atrium. Normal right ventricular  size and function.  Normal tricuspid valve. Normal pulmonic valve.  Pericardium/Pleura: Normal pericardium with no pericardial  effusion.  Hemodynamic: Estimated right atrial pressure is normal.  No evidence of pulmonary hypertension.  ------------------------------------------------------------------------  Conclusions:  Endocardial visualization enhanced with intravenous  injection of Ultrasonic Enhancing Agent (Definity).  Mild left ventricular enlargement.  Estiamted ejection fraction 45%. Large area of apical  akinesis.  An apical thrombus is present.

## 2022-10-20 NOTE — PROGRESS NOTE ADULT - SUBJECTIVE AND OBJECTIVE BOX
Patient is a 52y old  Male who presents with a chief complaint of acute MI, NSTEMI (20 Oct 2022 05:06)      SUBJECTIVE / OVERNIGHT EVENTS:    MEDICATIONS  (STANDING):  aspirin enteric coated 81 milliGRAM(s) Oral daily  atorvastatin 40 milliGRAM(s) Oral at bedtime  heparin  Infusion.  Unit(s)/Hr (12 mL/Hr) IV Continuous <Continuous>  nicotine -   7 mG/24Hr(s) Patch 1 Patch Transdermal daily  sodium chloride 0.9%. 1000 milliLiter(s) (100 mL/Hr) IV Continuous <Continuous>    MEDICATIONS  (PRN):  heparin   Injectable 5500 Unit(s) IV Push every 6 hours PRN For aPTT less than 40  heparin   Injectable 2500 Unit(s) IV Push every 6 hours PRN For aPTT between 40 - 57      Vital Signs Last 24 Hrs  T(F): 98.1 (10-20-22 @ 11:55), Max: 98.3 (10-19-22 @ 17:26)  HR: 71 (10-20-22 @ 11:55) (64 - 75)  BP: 113/69 (10-20-22 @ 11:55) (100/64 - 120/71)  RR: 18 (10-20-22 @ 11:55) (17 - 18)  SpO2: 97% (10-20-22 @ 11:55) (96% - 99%)  Telemetry:   CAPILLARY BLOOD GLUCOSE        I&O's Summary    19 Oct 2022 07:01  -  20 Oct 2022 07:00  --------------------------------------------------------  IN: 720 mL / OUT: 0 mL / NET: 720 mL        PHYSICAL EXAM:  GENERAL: NAD, well-developed  HEAD:  Atraumatic, Normocephalic  EYES: EOMI, PERRLA, conjunctiva and sclera clear  NECK: Supple, No JVD  CHEST/LUNG: Clear to auscultation bilaterally; No wheeze  HEART: Regular rate and rhythm; No murmurs, rubs, or gallops  ABDOMEN: Soft, Nontender, Nondistended; Bowel sounds present  EXTREMITIES:  2+ Peripheral Pulses, No clubbing, cyanosis, or edema  PSYCH: AAOx3  NEUROLOGY: non-focal  SKIN: No rashes or lesions    LABS:                        15.2   7.27  )-----------( 198      ( 19 Oct 2022 05:39 )             43.9     10-19    134<L>  |  103  |  18  ----------------------------<  113<H>  4.1   |  21<L>  |  0.96    Ca    8.7      19 Oct 2022 05:39  Phos  3.3     10-19  Mg     1.9     10-19    TPro  6.1  /  Alb  3.6  /  TBili  0.5  /  DBili  x   /  AST  17  /  ALT  15  /  AlkPhos  47  10-19    PT/INR - ( 19 Oct 2022 05:39 )   PT: 11.8 sec;   INR: 1.03 ratio         PTT - ( 20 Oct 2022 06:52 )  PTT:86.2 sec      Urinalysis Basic - ( 19 Oct 2022 01:35 )    Color: Light Yellow / Appearance: Clear / S.013 / pH: x  Gluc: x / Ketone: Negative  / Bili: Negative / Urobili: Negative   Blood: x / Protein: Negative / Nitrite: Negative   Leuk Esterase: Negative / RBC: x / WBC x   Sq Epi: x / Non Sq Epi: x / Bacteria: x        RADIOLOGY & ADDITIONAL TESTS:    Imaging Personally Reviewed:    Consultant(s) Notes Reviewed:      Care Discussed with Consultants/Other Providers:

## 2022-10-20 NOTE — PROGRESS NOTE ADULT - ASSESSMENT
52 year old Citizen of [place] male, current 0.5 ppd smoker, with no know significant past medical history who presented to the ED at [facility] after being transferred from [facility] with abnormal ECG findings. The patient presented to [facility] earlier today with a 1 year history of exertional chest pain, consistent with typical angina, worse in the last 24 hours. Troponin T at [facility] WNL, hsTroponin ~2 hours later on admission labs WNL. On arrival ECG demonstrated ST segmental elevations in anterolateral leads with Q waves suggestive of prior infarct with ongoing symptoms. ECG did not meet criteria for STEMI. He was given brilenta 180 mg PO, loaded with IV heparin and started on heparin ggt. Patient was consented for cardiac catheterization. Patient to be admitted for management of NSTEMI.    #NSTEMI  #Triple vessel CAD: C showing prox LAD , mid Cx 50%, prox RCA 40%, RPL    - CT Surgery evaluation ongoing: cMRI viability study pending  - continue ASA 81 qd, hold Brilinta pending CT Surg eval  - continue Atorvastatin 40 qHS    #iCM/HFrEF (EF 45%):  - will discuss GDMT    #Apical Thrombus  - apical thrombus noted on TTE: continue heparin gtt 52 year old [place] male, current 0.5 ppd smoker, with no know significant past medical history who presented to the ED at [facility] after being transferred from [facility] with abnormal ECG findings. The patient presented to [facility] earlier today with a 1 year history of exertional chest pain, consistent with typical angina, worse in the last 24 hours. Troponin T at [facility] WNL, hsTroponin ~2 hours later on admission labs WNL. On arrival ECG demonstrated ST segmental elevations in anterolateral leads with Q waves suggestive of prior infarct with ongoing symptoms. ECG did not meet criteria for STEMI. He was given brilenta 180 mg PO, loaded with IV heparin and started on heparin ggt. Patient was consented for cardiac catheterization. Patient to be admitted for management of NSTEMI.    #NSTEMI  #Triple vessel CAD: LHC showing prox LAD , mid Cx 50%, prox RCA 40%, RPL    - CT Surgery evaluation ongoing: cMRI viability study as above  - continue ASA 81 qd, hold Brilinta pending CT Surg eval  - continue Atorvastatin 40 qHS    #iCM/HFrEF (EF 45%):  - will discuss GDMT    #Apical Thrombus  - apical thrombus noted on TTE: continue heparin gtt

## 2022-10-20 NOTE — PROGRESS NOTE ADULT - ASSESSMENT
Patient is a 52 year old [place] male, current 0.5 ppd smoker, with no know significant past medical history who presented to the ED at [facility] after being transferred from [facility] with abnormal ECG findings. The patient presented to [facility] earlier today with a 1 year history of exertional chest pain, consistent with typical angina, worse in the last 24 hours. Troponin T at [facility] WNL, hsTroponin ~2 hours later on admission labs WNL. On arrival ECG demonstrated ST segmental elevations in anterolateral leads with Q waves suggestive of prior infarct with ongoing symptoms. ECG did not meet criteria for STEMI. He was given brilenta 180 mg PO, loaded with IV heparin and started on heparin ggt. Patient was consented for cardiac catheterization. Patient to be admitted for management of NSTEMI.  NSTEMI  Triple vessel CAD: LHC showing prox LAD , mid Cx 50%, prox RCA 40%, RPL    - on the schedule for CABG on 10/24  - s/p  + Brilinta 360 mg total; continue ASA 81 qd,   - cardiac viability study done, some viable , inferior and apical not viable  - continue Atorvastatin 40 qHS  - BP is soft, HR below 60, unable to start BB  - TTE: EF 45%, on heparin drip for apical thrombus on TTE

## 2022-10-21 DIAGNOSIS — I25.10 ATHEROSCLEROTIC HEART DISEASE OF NATIVE CORONARY ARTERY WITHOUT ANGINA PECTORIS: ICD-10-CM

## 2022-10-21 LAB
APTT BLD: 97.8 SEC — HIGH (ref 27.5–35.5)
HCT VFR BLD CALC: 45 % — SIGNIFICANT CHANGE UP (ref 39–50)
HGB BLD-MCNC: 15.2 G/DL — SIGNIFICANT CHANGE UP (ref 13–17)
MCHC RBC-ENTMCNC: 31.8 PG — SIGNIFICANT CHANGE UP (ref 27–34)
MCHC RBC-ENTMCNC: 33.8 GM/DL — SIGNIFICANT CHANGE UP (ref 32–36)
MCV RBC AUTO: 94.1 FL — SIGNIFICANT CHANGE UP (ref 80–100)
NRBC # BLD: 0 /100 WBCS — SIGNIFICANT CHANGE UP (ref 0–0)
PLATELET # BLD AUTO: 194 K/UL — SIGNIFICANT CHANGE UP (ref 150–400)
RBC # BLD: 4.78 M/UL — SIGNIFICANT CHANGE UP (ref 4.2–5.8)
RBC # FLD: 11.9 % — SIGNIFICANT CHANGE UP (ref 10.3–14.5)
WBC # BLD: 5.85 K/UL — SIGNIFICANT CHANGE UP (ref 3.8–10.5)
WBC # FLD AUTO: 5.85 K/UL — SIGNIFICANT CHANGE UP (ref 3.8–10.5)

## 2022-10-21 PROCEDURE — 99233 SBSQ HOSP IP/OBS HIGH 50: CPT | Mod: GC

## 2022-10-21 PROCEDURE — 99231 SBSQ HOSP IP/OBS SF/LOW 25: CPT

## 2022-10-21 RX ADMIN — Medication 1 PATCH: at 13:21

## 2022-10-21 RX ADMIN — Medication 81 MILLIGRAM(S): at 13:20

## 2022-10-21 RX ADMIN — ATORVASTATIN CALCIUM 40 MILLIGRAM(S): 80 TABLET, FILM COATED ORAL at 21:06

## 2022-10-21 RX ADMIN — Medication 1 PATCH: at 19:45

## 2022-10-21 RX ADMIN — Medication 1 PATCH: at 12:00

## 2022-10-21 RX ADMIN — HEPARIN SODIUM 1200 UNIT(S)/HR: 5000 INJECTION INTRAVENOUS; SUBCUTANEOUS at 09:09

## 2022-10-21 RX ADMIN — Medication 1 PATCH: at 07:44

## 2022-10-21 RX ADMIN — HEPARIN SODIUM 1200 UNIT(S)/HR: 5000 INJECTION INTRAVENOUS; SUBCUTANEOUS at 20:19

## 2022-10-21 NOTE — PROGRESS NOTE ADULT - PROBLEM SELECTOR PLAN 1
Preop work up initiated for CABG evaluation   Dr Ellis to review angiogram and imaging  TTE completed  check p2y12 serum levels in Sunday  hold p2y12i - hold brillinta  on hep gtt for LV thrombus  start betablocker as tolerated

## 2022-10-21 NOTE — PROGRESS NOTE ADULT - ASSESSMENT
Patient is a 52 year old [place] male, current 0.5 ppd smoker, with no know significant past medical history who presented to the ED at [facility] after being transferred from [facility] with abnormal ECG findings. The patient presented to [facility] earlier today with a 1 year history of exertional chest pain, consistent with typical angina, worse in the last 24 hours. Troponin T at [facility] WNL, hsTroponin ~2 hours later on admission labs WNL. On arrival ECG demonstrated ST segmental elevations in anterolateral leads with Q waves suggestive of prior infarct with ongoing symptoms. ECG did not meet criteria for STEMI. He was given brilenta 180 mg PO, loaded with IV heparin and started on heparin ggt. Patient was consented for cardiac catheterization. Patient to be admitted for management of NSTEMI.  NSTEMI  Triple vessel CAD: LHC showing prox LAD , mid Cx 50%, prox RCA 40%, RPL    - on the schedule for CABG on 10/24  - s/p  + Brilinta 360 mg total; continue ASA 81 qd,   - cardiac viability study done, some viable , inferior and apical not viable  - continue Atorvastatin 40 qHS  - BP is soft, HR below 60, unable to start BB  - TTE: EF 45%, on heparin drip for apical thrombus on TTE

## 2022-10-21 NOTE — PROGRESS NOTE ADULT - SUBJECTIVE AND OBJECTIVE BOX
VITAL SIGNS    Telemetry:  SR 70-80  Vital Signs Last 24 Hrs  T(C): 36.3 (10-21-22 @ 04:45), Max: 36.8 (10-20-22 @ 21:05)  T(F): 97.3 (10-21-22 @ 04:45), Max: 98.3 (10-20-22 @ 21:05)  HR: 87 (10-20-22 @ 21:05) (71 - 87)  BP: 105/69 (10-21-22 @ 04:45) (102/69 - 113/69)  RR: 18 (10-21-22 @ 04:45) (18 - 18)  SpO2: 97% (10-21-22 @ 04:45) (97% - 99%)             Daily     Daily   Admit Wt: Drug Dosing Weight  Height (cm): 162.6 (17 Oct 2022 12:40)  Weight (kg): 68.7 (17 Oct 2022 12:45)  BMI (kg/m2): 26 (17 Oct 2022 12:45)  BSA (m2): 1.74 (17 Oct 2022 12:45)      MEDICATIONS  aspirin enteric coated 81 milliGRAM(s) Oral daily  atorvastatin 40 milliGRAM(s) Oral at bedtime  heparin   Injectable 5500 Unit(s) IV Push every 6 hours PRN  heparin   Injectable 2500 Unit(s) IV Push every 6 hours PRN  heparin  Infusion.  Unit(s)/Hr IV Continuous <Continuous>  nicotine -   7 mG/24Hr(s) Patch 1 Patch Transdermal daily  sodium chloride 0.9%. 1000 milliLiter(s) IV Continuous <Continuous>      >>> <<<  PHYSICAL EXAM  Subjective: NAD  Neurology: alert and oriented x 3, nonfocal, no gross deficits  CV : s1s2  Lungs: CTA   Abdomen: soft, NT,ND, (+ )BM  :  voiding  Extremities:  -c/c/e     LABS                      PTT - ( 21 Oct 2022 08:01 )  PTT:97.8 sec       PAST MEDICAL & SURGICAL HISTORY:

## 2022-10-21 NOTE — PROGRESS NOTE ADULT - SUBJECTIVE AND OBJECTIVE BOX
Patient seen and examined at bedside.    Overnight Events: No acute events overnight. Denies chest pain or SOB      REVIEW OF SYSTEMS:  Constitutional:     [ x] negative [ ] fevers [ ] chills [ ] weight loss [ ] weight gain  HEENT:                  [ x] negative [ ] dry eyes [ ] eye irritation [ ] postnasal drip [ ] nasal congestion  CV:                         [x ] negative  [ ] chest pain [ ] orthopnea [ ] palpitations [ ] murmur  Resp:                     [ x] negative [ ] cough [ ] shortness of breath [ ] dyspnea [ ] wheezing [ ] sputum [ ]hemoptysis  GI:                          [ x] negative [ ] nausea [ ] vomiting [ ] diarrhea [ ] constipation [ ] abd pain [ ] dysphagia   :                        [ x] negative [ ] dysuria [ ] nocturia [ ] hematuria [ ] increased urinary frequency  Musculoskeletal: [ x] negative [ ] back pain [ ] myalgias [ ] arthralgias [ ] fracture  Skin:                       [ x] negative [ ] rash [ ] itch  Neurological:        [ x] negative [ ] headache [ ] dizziness [ ] syncope [ ] weakness [ ] numbness  Psychiatric:           [ x] negative [ ] anxiety [ ] depression  Endocrine:            [ x] negative [ ] diabetes [ ] thyroid problem  Heme/Lymph:      [ x] negative [ ] anemia [ ] bleeding problem  Allergic/Immune: [x ] negative [ ] itchy eyes [ ] nasal discharge [ ] hives [ ] angioedema    [x ] All other systems negative  [ ] Unable to assess ROS due to    Current Meds:  aspirin enteric coated 81 milliGRAM(s) Oral daily  atorvastatin 40 milliGRAM(s) Oral at bedtime  heparin   Injectable 5500 Unit(s) IV Push every 6 hours PRN  heparin   Injectable 2500 Unit(s) IV Push every 6 hours PRN  heparin  Infusion.  Unit(s)/Hr IV Continuous <Continuous>  nicotine -   7 mG/24Hr(s) Patch 1 Patch Transdermal daily  sodium chloride 0.9%. 1000 milliLiter(s) IV Continuous <Continuous>      PAST MEDICAL & SURGICAL HISTORY:      Vitals:  T(F): 97.3 (10-21), Max: 98.3 (10-20)  HR: 87 (10-20) (71 - 87)  BP: 105/69 (10-21) (102/69 - 113/69)  RR: 18 (10-21)  SpO2: 97% (10-21)  I&O's Summary    19 Oct 2022 07:01  -  20 Oct 2022 07:00  --------------------------------------------------------  IN: 720 mL / OUT: 0 mL / NET: 720 mL        Physical Exam:  Appearance: No acute distress; well appearing  Eyes: PERRL, EOMI, pink conjunctiva  HENT: Normal oral mucosa  Cardiovascular: RRR, S1, S2, no murmurs, rubs, or gallops; no edema; no JVD  Respiratory: Clear to auscultation bilaterally  Gastrointestinal: soft, non-tender, non-distended with normal bowel sounds  Musculoskeletal: No clubbing; no joint deformity   Neurologic: Non-focal  Lymphatic: No lymphadenopathy  Psychiatry: AAOx3, mood & affect appropriate  Skin: No rashes, ecchymoses, or cyanosis            PTT - ( 20 Oct 2022 17:54 )  PTT:78.9 sec      Serum Pro-Brain Natriuretic Peptide: 331 pg/mL (10-19 @ 05:39)  Serum Pro-Brain Natriuretic Peptide: 690 pg/mL (10-17 @ 13:16)             Patient seen and examined at bedside.    Overnight Events: No acute events overnight. Denies chest pain or SOB. Patient ambulating without assistance, denies onset of symptoms.       REVIEW OF SYSTEMS:  Constitutional:     [ x] negative [ ] fevers [ ] chills [ ] weight loss [ ] weight gain  HEENT:                  [ x] negative [ ] dry eyes [ ] eye irritation [ ] postnasal drip [ ] nasal congestion  CV:                         [x ] negative  [ ] chest pain [ ] orthopnea [ ] palpitations [ ] murmur  Resp:                     [ x] negative [ ] cough [ ] shortness of breath [ ] dyspnea [ ] wheezing [ ] sputum [ ]hemoptysis  GI:                          [ x] negative [ ] nausea [ ] vomiting [ ] diarrhea [ ] constipation [ ] abd pain [ ] dysphagia   :                        [ x] negative [ ] dysuria [ ] nocturia [ ] hematuria [ ] increased urinary frequency  Musculoskeletal: [ x] negative [ ] back pain [ ] myalgias [ ] arthralgias [ ] fracture  Skin:                       [ x] negative [ ] rash [ ] itch  Neurological:        [ x] negative [ ] headache [ ] dizziness [ ] syncope [ ] weakness [ ] numbness  Psychiatric:           [ x] negative [ ] anxiety [ ] depression  Endocrine:            [ x] negative [ ] diabetes [ ] thyroid problem  Heme/Lymph:      [ x] negative [ ] anemia [ ] bleeding problem  Allergic/Immune: [x ] negative [ ] itchy eyes [ ] nasal discharge [ ] hives [ ] angioedema    [x ] All other systems negative  [ ] Unable to assess ROS due to    Current Meds:  aspirin enteric coated 81 milliGRAM(s) Oral daily  atorvastatin 40 milliGRAM(s) Oral at bedtime  heparin   Injectable 5500 Unit(s) IV Push every 6 hours PRN  heparin   Injectable 2500 Unit(s) IV Push every 6 hours PRN  heparin  Infusion.  Unit(s)/Hr IV Continuous <Continuous>  nicotine -   7 mG/24Hr(s) Patch 1 Patch Transdermal daily  sodium chloride 0.9%. 1000 milliLiter(s) IV Continuous <Continuous>      PAST MEDICAL & SURGICAL HISTORY:      Vitals:  T(F): 97.3 (10-21), Max: 98.3 (10-20)  HR: 87 (10-20) (71 - 87)  BP: 105/69 (10-21) (102/69 - 113/69)  RR: 18 (10-21)  SpO2: 97% (10-21)  I&O's Summary    19 Oct 2022 07:01  -  20 Oct 2022 07:00  --------------------------------------------------------  IN: 720 mL / OUT: 0 mL / NET: 720 mL        Physical Exam:  Appearance: No acute distress; well appearing  Eyes: PERRL, EOMI, pink conjunctiva  HENT: Normal oral mucosa  Cardiovascular: RRR, S1, S2, no murmurs, rubs, or gallops; no edema; no JVD  Respiratory: Clear to auscultation bilaterally  Gastrointestinal: soft, non-tender, non-distended with normal bowel sounds  Musculoskeletal: No clubbing; no joint deformity   Neurologic: Non-focal  Lymphatic: No lymphadenopathy  Psychiatry: AAOx3, mood & affect appropriate  Skin: No rashes, ecchymoses, or cyanosis            PTT - ( 20 Oct 2022 17:54 )  PTT:78.9 sec      Serum Pro-Brain Natriuretic Peptide: 331 pg/mL (10-19 @ 05:39)  Serum Pro-Brain Natriuretic Peptide: 690 pg/mL (10-17 @ 13:16)

## 2022-10-21 NOTE — PROGRESS NOTE ADULT - ASSESSMENT
52 year old man with progressive exertional angina, no symptoms at rest, EKG with ST-T wave abnormality consistent with ischemia, troponin is not elevated.  He is an active cigarette smoker 0.5 ppd smoker. Study demonstrated severe 3 vessel disease. Echo demonstrates apical akinesis, apical thrombus, overall EF 45%. Maintain on heparin. CT surgery evaluation for revascularization

## 2022-10-21 NOTE — PROGRESS NOTE ADULT - ATTENDING COMMENTS
52 year old man with progressive exertional angina, no symptoms at rest, EKG with ST-T wave abnormality consistent with ischemia, troponin is not elevated. Diagnosis is unstable angina, ACS protocol started and proceeded to early coronary angiography. He is an active cigarette smoker. Study demonstrated severe 3 vessel disease. Echo demonstrates apical akinesis, apical thrombus, overall EF 45%. Maintain on heparin. CT surgery evaluation for revascularization.    To contact call Cardiology Fellow or Attending as listed on [email or web address] password: [email or web address].
52 year old man with progressive exertional angina, no symptoms at rest, EKG with ST-T wave abnormality consistent with ischemia, troponin is not elevated. Diagnosis is unstable angina, ACS protocol started and proceeded to early coronary angiography. He is an active cigarette smoker. Study demonstrated severe 3 vessel disease. Echo demonstrates apical akinesis, apical thrombus, overall EF 45%. Maintain on heparin. CT surgery evaluating for revascularization. Cardiac MRI today for myocardial viability.    To contact call Cardiology Fellow or Attending as listed on [email or web address] password: [email or web address].
52 year old man with progressive exertional angina, no symptoms at rest, EKG with ST-T wave abnormality consistent with ischemia, troponin is not elevated. Diagnosis of unstable angina, ACS protocol started and proceeded to early coronary angiography. He is an active cigarette smoker. Study demonstrated severe 3 vessel disease. Echo demonstrates apical akinesis, apical thrombus, overall EF 45%. Maintain on heparin. Cardiac MRI demonstrates extensive myocardial viability. CT surgery evaluating for revascularization planned for 10/24/2022.    To contact call Cardiology Fellow or Attending as listed on [email or web address] password: [email or web address].
52 year old man with progressive exertional angina, no symptoms at rest, EKG with ST-T wave abnormality consistent with ischemia, troponin is not elevated. Diagnosis is unstable angina, ACS protocol started and proceeded to early coronary angiography. He is an active cigarette smoker. Study demonstrated severe 3 vessel disease. Echo demonstrates apical akinesis, apical thrombus, overall EF 45%. Maintain on heparin. CT surgery evaluating for revascularization. Cardiac MRI demonstrates extensive myocardial viability.    To contact call Cardiology Fellow or Attending as listed on [email or web address] password: [email or web address].

## 2022-10-21 NOTE — PROGRESS NOTE ADULT - SUBJECTIVE AND OBJECTIVE BOX
Patient is a 52y old  Male who presents with a chief complaint of acute MI, NSTEMI (21 Oct 2022 09:32)      SUBJECTIVE / OVERNIGHT EVENTS: no new c/o, awaiting CABG on 10/24    MEDICATIONS  (STANDING):  aspirin enteric coated 81 milliGRAM(s) Oral daily  atorvastatin 40 milliGRAM(s) Oral at bedtime  heparin  Infusion.  Unit(s)/Hr (12 mL/Hr) IV Continuous <Continuous>  nicotine -   7 mG/24Hr(s) Patch 1 Patch Transdermal daily  sodium chloride 0.9%. 1000 milliLiter(s) (100 mL/Hr) IV Continuous <Continuous>    MEDICATIONS  (PRN):  heparin   Injectable 5500 Unit(s) IV Push every 6 hours PRN For aPTT less than 40  heparin   Injectable 2500 Unit(s) IV Push every 6 hours PRN For aPTT between 40 - 57      Vital Signs Last 24 Hrs  T(F): 97.2 (10-21-22 @ 11:30), Max: 98.3 (10-20-22 @ 21:05)  HR: 64 (10-21-22 @ 11:30) (64 - 87)  BP: 105/66 (10-21-22 @ 11:30) (102/69 - 105/69)  RR: 18 (10-21-22 @ 11:30) (18 - 18)  SpO2: 97% (10-21-22 @ 11:30) (97% - 99%)  Telemetry:   CAPILLARY BLOOD GLUCOSE        I&O's Summary    21 Oct 2022 07:01  -  21 Oct 2022 13:23  --------------------------------------------------------  IN: 240 mL / OUT: 0 mL / NET: 240 mL        PHYSICAL EXAM:  GENERAL: NAD, well-developed  HEAD:  Atraumatic, Normocephalic  EYES: EOMI, PERRLA, conjunctiva and sclera clear  NECK: Supple, No JVD  CHEST/LUNG: Clear to auscultation bilaterally; No wheeze  HEART: Regular rate and rhythm; No murmurs, rubs, or gallops  ABDOMEN: Soft, Nontender, Nondistended; Bowel sounds present  EXTREMITIES:  2+ Peripheral Pulses, No clubbing, cyanosis, or edema  PSYCH: AAOx3  NEUROLOGY: non-focal  SKIN: No rashes or lesions    LABS:                        15.2   5.85  )-----------( 194      ( 21 Oct 2022 11:55 )             45.0           PTT - ( 21 Oct 2022 08:01 )  PTT:97.8 sec          RADIOLOGY & ADDITIONAL TESTS:    Imaging Personally Reviewed:    Consultant(s) Notes Reviewed:      Care Discussed with Consultants/Other Providers:

## 2022-10-21 NOTE — PROGRESS NOTE ADULT - ASSESSMENT
52 year old Malaysian male, current 0.5 ppd smoker, with no know significant past medical history who presented to the ED at [facility] after being transferred from [facility] [place] with abnormal ECG findings. The patient presented to [facility] [place] earlier today with a 1 year history of exertional chest pain, consistent with typical angina, worse in the last 24 hours. Troponin T at [facility] [place] WNL, hsTroponin ~2 hours later on admission labs WNL. On arrival ECG demonstrated ST segmental elevations in anterolateral leads with Q waves suggestive of prior infarct with ongoing symptoms. ECG did not meet criteria for STEMI. He was given brilenta 180 mg PO, loaded with IV heparin and started on heparin ggt. Patient was consented for cardiac catheterization. Patient to be admitted for management of NSTEMI.    #NSTEMI  #Triple vessel CAD: LHC showing prox LAD , mid Cx 50%, prox RCA 40%, RPL    - CT Surgery evaluation ongoing, plan for CABG 10/24: cMRI viability study as above  - continue ASA 81 qd, hold Brilinta pending CT Surg eval  - continue Atorvastatin 40 qHS    #iCM/HFrEF (EF 45%):  - will discuss GDMT    #Apical Thrombus  - apical thrombus noted on TTE: continue heparin gtt

## 2022-10-22 LAB
APTT BLD: 102.9 SEC — HIGH (ref 27.5–35.5)
APTT BLD: 63.1 SEC — HIGH (ref 27.5–35.5)
HCT VFR BLD CALC: 45.1 % — SIGNIFICANT CHANGE UP (ref 39–50)
HGB BLD-MCNC: 15 G/DL — SIGNIFICANT CHANGE UP (ref 13–17)
MCHC RBC-ENTMCNC: 31.8 PG — SIGNIFICANT CHANGE UP (ref 27–34)
MCHC RBC-ENTMCNC: 33.3 GM/DL — SIGNIFICANT CHANGE UP (ref 32–36)
MCV RBC AUTO: 95.6 FL — SIGNIFICANT CHANGE UP (ref 80–100)
NRBC # BLD: 0 /100 WBCS — SIGNIFICANT CHANGE UP (ref 0–0)
PLATELET # BLD AUTO: 170 K/UL — SIGNIFICANT CHANGE UP (ref 150–400)
RBC # BLD: 4.72 M/UL — SIGNIFICANT CHANGE UP (ref 4.2–5.8)
RBC # FLD: 11.9 % — SIGNIFICANT CHANGE UP (ref 10.3–14.5)
SARS-COV-2 RNA SPEC QL NAA+PROBE: SIGNIFICANT CHANGE UP
WBC # BLD: 6.39 K/UL — SIGNIFICANT CHANGE UP (ref 3.8–10.5)
WBC # FLD AUTO: 6.39 K/UL — SIGNIFICANT CHANGE UP (ref 3.8–10.5)

## 2022-10-22 RX ORDER — HEPARIN SODIUM 5000 [USP'U]/ML
5500 INJECTION INTRAVENOUS; SUBCUTANEOUS EVERY 6 HOURS
Refills: 0 | Status: DISCONTINUED | OUTPATIENT
Start: 2022-10-22 | End: 2022-10-24

## 2022-10-22 RX ORDER — HEPARIN SODIUM 5000 [USP'U]/ML
2500 INJECTION INTRAVENOUS; SUBCUTANEOUS EVERY 6 HOURS
Refills: 0 | Status: DISCONTINUED | OUTPATIENT
Start: 2022-10-22 | End: 2022-10-24

## 2022-10-22 RX ORDER — HEPARIN SODIUM 5000 [USP'U]/ML
1100 INJECTION INTRAVENOUS; SUBCUTANEOUS
Qty: 25000 | Refills: 0 | Status: DISCONTINUED | OUTPATIENT
Start: 2022-10-22 | End: 2022-10-24

## 2022-10-22 RX ADMIN — Medication 1 PATCH: at 17:49

## 2022-10-22 RX ADMIN — Medication 1 PATCH: at 19:27

## 2022-10-22 RX ADMIN — HEPARIN SODIUM 1100 UNIT(S)/HR: 5000 INJECTION INTRAVENOUS; SUBCUTANEOUS at 11:19

## 2022-10-22 RX ADMIN — HEPARIN SODIUM 1200 UNIT(S)/HR: 5000 INJECTION INTRAVENOUS; SUBCUTANEOUS at 09:57

## 2022-10-22 RX ADMIN — ATORVASTATIN CALCIUM 40 MILLIGRAM(S): 80 TABLET, FILM COATED ORAL at 21:12

## 2022-10-22 RX ADMIN — Medication 1 PATCH: at 13:53

## 2022-10-22 RX ADMIN — HEPARIN SODIUM 1100 UNIT(S)/HR: 5000 INJECTION INTRAVENOUS; SUBCUTANEOUS at 19:00

## 2022-10-22 RX ADMIN — Medication 81 MILLIGRAM(S): at 13:53

## 2022-10-22 NOTE — PROVIDER CONTACT NOTE (OTHER) - ACTION/TREATMENT ORDERED:
Decrease heparin gtt 11 ml/hr, recheck in 6 hours, no additional orders or interventions at this time, will continue to monitor pt

## 2022-10-22 NOTE — PROGRESS NOTE ADULT - ASSESSMENT
Patient is a 52 year old Vietnamese male, current 0.5 ppd smoker, with no know significant past medical history who presented to the ED at [facility] after being transferred from [facility] with abnormal ECG findings. The patient presented to [facility] earlier today with a 1 year history of exertional chest pain, consistent with typical angina, worse in the last 24 hours. Troponin T at [facility] WNL, hsTroponin ~2 hours later on admission labs WNL. On arrival ECG demonstrated ST segmental elevations in anterolateral leads with Q waves suggestive of prior infarct with ongoing symptoms. ECG did not meet criteria for STEMI. He was given brilenta 180 mg PO, loaded with IV heparin and started on heparin ggt. Patient was consented for cardiac catheterization. Patient to be admitted for management of NSTEMI.  NSTEMI  Triple vessel CAD: LHC showing prox LAD , mid Cx 50%, prox RCA 40%, RPL    - on the schedule for CABG on 10/24  - s/p  + Brilinta 360 mg total on admission; continue ASA 81 qd,   - cardiac viability study done, some viable , inferior and apical not viable  - continue Atorvastatin 40 qHS  - BP is soft, HR below 60, unable to start BB  - TTE: EF 45%, on heparin drip for apical thrombus on TTE

## 2022-10-22 NOTE — PROGRESS NOTE ADULT - SUBJECTIVE AND OBJECTIVE BOX
Patient is a 52y old  Male who presents with a chief complaint of acute MI, NSTEMI (21 Oct 2022 13:23)      SUBJECTIVE / OVERNIGHT EVENTS: denies CP, remains on heparin drip    MEDICATIONS  (STANDING):  aspirin enteric coated 81 milliGRAM(s) Oral daily  atorvastatin 40 milliGRAM(s) Oral at bedtime  heparin  Infusion. 1100 Unit(s)/Hr (11 mL/Hr) IV Continuous <Continuous>  nicotine -   7 mG/24Hr(s) Patch 1 Patch Transdermal daily    MEDICATIONS  (PRN):  heparin   Injectable 5500 Unit(s) IV Push every 6 hours PRN For aPTT less than 40  heparin   Injectable 2500 Unit(s) IV Push every 6 hours PRN For aPTT between 40 - 57      Vital Signs Last 24 Hrs  T(F): 98.1 (10-22-22 @ 11:58), Max: 98.1 (10-22-22 @ 11:58)  HR: 59 (10-22-22 @ 11:58) (59 - 69)  BP: 100/59 (10-22-22 @ 11:58) (100/59 - 109/67)  RR: 18 (10-22-22 @ 11:58) (18 - 18)  SpO2: 99% (10-22-22 @ 11:58) (94% - 100%)  Telemetry:   CAPILLARY BLOOD GLUCOSE        I&O's Summary    21 Oct 2022 07:01  -  22 Oct 2022 07:00  --------------------------------------------------------  IN: 720 mL / OUT: 600 mL / NET: 120 mL    22 Oct 2022 07:01  -  22 Oct 2022 15:15  --------------------------------------------------------  IN: 549 mL / OUT: 550 mL / NET: -1 mL        PHYSICAL EXAM:  GENERAL: NAD, well-developed  HEAD:  Atraumatic, Normocephalic  EYES: EOMI, PERRLA, conjunctiva and sclera clear  NECK: Supple, No JVD  CHEST/LUNG: Clear to auscultation bilaterally; No wheeze  HEART: Regular rate and rhythm; No murmurs, rubs, or gallops  ABDOMEN: Soft, Nontender, Nondistended; Bowel sounds present  EXTREMITIES:  2+ Peripheral Pulses, No clubbing, cyanosis, or edema  PSYCH: AAOx3  NEUROLOGY: non-focal  SKIN: No rashes or lesions    LABS:                        15.2   5.85  )-----------( 194      ( 21 Oct 2022 11:55 )             45.0           PTT - ( 22 Oct 2022 06:41 )  PTT:102.9 sec          RADIOLOGY & ADDITIONAL TESTS:    Imaging Personally Reviewed:    Consultant(s) Notes Reviewed:      Care Discussed with Consultants/Other Providers:

## 2022-10-22 NOTE — PROVIDER CONTACT NOTE (OTHER) - BACKGROUND
10/17/22 s/p cardiac catheterization, TTE Lt apical thrombus, pre-op OHS for Monday, heparin gtt @ 11 ml/hr, hx current smoker, NSTEMI, CAD

## 2022-10-23 ENCOUNTER — TRANSCRIPTION ENCOUNTER (OUTPATIENT)
Age: 52
End: 2022-10-23

## 2022-10-23 LAB
APTT BLD: 61.4 SEC — HIGH (ref 27.5–35.5)
APTT BLD: 74 SEC — HIGH (ref 27.5–35.5)
APTT BLD: >200 SEC — CRITICAL HIGH (ref 27.5–35.5)
BLD GP AB SCN SERPL QL: NEGATIVE — SIGNIFICANT CHANGE UP
HCT VFR BLD CALC: 40.8 % — SIGNIFICANT CHANGE UP (ref 39–50)
HGB BLD-MCNC: 13.9 G/DL — SIGNIFICANT CHANGE UP (ref 13–17)
MCHC RBC-ENTMCNC: 32.4 PG — SIGNIFICANT CHANGE UP (ref 27–34)
MCHC RBC-ENTMCNC: 34.1 GM/DL — SIGNIFICANT CHANGE UP (ref 32–36)
MCV RBC AUTO: 95.1 FL — SIGNIFICANT CHANGE UP (ref 80–100)
NRBC # BLD: 0 /100 WBCS — SIGNIFICANT CHANGE UP (ref 0–0)
PA ADP PRP-ACNC: 186 PRU — LOW (ref 194–417)
PLATELET # BLD AUTO: 152 K/UL — SIGNIFICANT CHANGE UP (ref 150–400)
RBC # BLD: 4.29 M/UL — SIGNIFICANT CHANGE UP (ref 4.2–5.8)
RBC # FLD: 12 % — SIGNIFICANT CHANGE UP (ref 10.3–14.5)
RH IG SCN BLD-IMP: POSITIVE — SIGNIFICANT CHANGE UP
WBC # BLD: 6.24 K/UL — SIGNIFICANT CHANGE UP (ref 3.8–10.5)
WBC # FLD AUTO: 6.24 K/UL — SIGNIFICANT CHANGE UP (ref 3.8–10.5)

## 2022-10-23 RX ORDER — METOPROLOL TARTRATE 50 MG
12.5 TABLET ORAL
Refills: 0 | Status: DISCONTINUED | OUTPATIENT
Start: 2022-10-23 | End: 2022-10-24

## 2022-10-23 RX ORDER — CEFUROXIME AXETIL 250 MG
1500 TABLET ORAL ONCE
Refills: 0 | Status: DISCONTINUED | OUTPATIENT
Start: 2022-10-24 | End: 2022-10-24

## 2022-10-23 RX ORDER — GABAPENTIN 400 MG/1
300 CAPSULE ORAL ONCE
Refills: 0 | Status: COMPLETED | OUTPATIENT
Start: 2022-10-24 | End: 2022-10-24

## 2022-10-23 RX ORDER — CHLORHEXIDINE GLUCONATE 213 G/1000ML
5 SOLUTION TOPICAL ONCE
Refills: 0 | Status: COMPLETED | OUTPATIENT
Start: 2022-10-23 | End: 2022-10-24

## 2022-10-23 RX ORDER — ASCORBIC ACID 60 MG
2000 TABLET,CHEWABLE ORAL AT BEDTIME
Refills: 0 | Status: COMPLETED | OUTPATIENT
Start: 2022-10-23 | End: 2022-10-23

## 2022-10-23 RX ORDER — CHLORHEXIDINE GLUCONATE 213 G/1000ML
1 SOLUTION TOPICAL ONCE
Refills: 0 | Status: COMPLETED | OUTPATIENT
Start: 2022-10-23 | End: 2022-10-23

## 2022-10-23 RX ORDER — ACETAMINOPHEN 500 MG
1000 TABLET ORAL ONCE
Refills: 0 | Status: COMPLETED | OUTPATIENT
Start: 2022-10-24 | End: 2022-10-24

## 2022-10-23 RX ADMIN — Medication 1 PATCH: at 06:25

## 2022-10-23 RX ADMIN — Medication 2000 MILLIGRAM(S): at 22:47

## 2022-10-23 RX ADMIN — Medication 1 PATCH: at 20:56

## 2022-10-23 RX ADMIN — Medication 81 MILLIGRAM(S): at 12:53

## 2022-10-23 RX ADMIN — Medication 1 PATCH: at 12:52

## 2022-10-23 RX ADMIN — Medication 12.5 MILLIGRAM(S): at 17:15

## 2022-10-23 RX ADMIN — Medication 1 PATCH: at 13:53

## 2022-10-23 RX ADMIN — CHLORHEXIDINE GLUCONATE 1 APPLICATION(S): 213 SOLUTION TOPICAL at 20:57

## 2022-10-23 RX ADMIN — HEPARIN SODIUM 1100 UNIT(S)/HR: 5000 INJECTION INTRAVENOUS; SUBCUTANEOUS at 12:52

## 2022-10-23 RX ADMIN — ATORVASTATIN CALCIUM 40 MILLIGRAM(S): 80 TABLET, FILM COATED ORAL at 22:48

## 2022-10-23 NOTE — PRE-ANESTHESIA EVALUATION ADULT - NSANTHPMHFT_GEN_ALL_CORE
51 yo Renea M w/ PMHx of current tobacco abuse (0.5-1 PPD) presented to [facility] after transfer from [facility] with abnormal ECG findings. He presented to [facility] w/ ~1 year history of exertional CP, consistent with typical angina, worse in the last 24 hours. Troponin T at [facility] WNL, hsTroponin ~2 hours later on admission labs WNL. On arrival ECG demonstrated ST segmental elevations in anterolateral leads with Q waves suggestive of prior infarct with ongoing symptoms. Given brilenta and started on heparin gtt. Pt underwent cardiac cath revealing Triple vessel CAD: LHC showing prox LAD , mid Cx 50%, prox RCA 40%, RPL . TTE revealed low-normal EF. Cardiac viability study done revealing some viability. Presents now for CABG. 53 yo Renea M w/ PMHx of current tobacco abuse (0.5-1 PPD) presented to [facility] after transfer from [facility] with abnormal ECG findings. He presented to [facility] w/ ~1 year history of exertional CP, consistent with typical angina, worse in the last 24 hours. Troponin T at [facility] WNL, hsTroponin ~2 hours later on admission labs WNL. On arrival ECG demonstrated ST segmental elevations in anterolateral leads with Q waves suggestive of prior infarct with ongoing symptoms. Given brilenta and started on heparin gtt. Pt underwent cardiac cath revealing Triple vessel CAD: LHC showing prox LAD , mid Cx 50%, prox RCA 40%, RPL . TTE revealed low-normal EF. Cardiac viability study done revealing some viability. Presents now for CABG.    Denies active CP/SOB/Orthopnea

## 2022-10-23 NOTE — PROGRESS NOTE ADULT - SUBJECTIVE AND OBJECTIVE BOX
Patient is a 52y old  Male who presents with a chief complaint of preop CABG (23 Oct 2022 05:47)      SUBJECTIVE / OVERNIGHT EVENTS: no new events    MEDICATIONS  (STANDING):  ascorbic acid 2000 milliGRAM(s) Oral at bedtime  aspirin enteric coated 81 milliGRAM(s) Oral daily  atorvastatin 40 milliGRAM(s) Oral at bedtime  chlorhexidine 0.12% Liquid 5 milliLiter(s) Swish and Spit once  chlorhexidine 4% Liquid 1 Application(s) Topical once  heparin  Infusion. 1100 Unit(s)/Hr (11 mL/Hr) IV Continuous <Continuous>  metoprolol tartrate 12.5 milliGRAM(s) Oral two times a day  nicotine -   7 mG/24Hr(s) Patch 1 Patch Transdermal daily    MEDICATIONS  (PRN):  heparin   Injectable 5500 Unit(s) IV Push every 6 hours PRN For aPTT less than 40  heparin   Injectable 2500 Unit(s) IV Push every 6 hours PRN For aPTT between 40 - 57      Vital Signs Last 24 Hrs  T(F): 97.9 (10-23-22 @ 12:20), Max: 98.5 (10-22-22 @ 20:06)  HR: 71 (10-23-22 @ 12:20) (64 - 71)  BP: 111/70 (10-23-22 @ 12:20) (98/63 - 111/70)  RR: 18 (10-23-22 @ 12:20) (17 - 18)  SpO2: 98% (10-23-22 @ 12:20) (97% - 98%)  Telemetry:   CAPILLARY BLOOD GLUCOSE        I&O's Summary    22 Oct 2022 07:01  -  23 Oct 2022 07:00  --------------------------------------------------------  IN: 1047 mL / OUT: 1350 mL / NET: -303 mL    23 Oct 2022 07:01  -  23 Oct 2022 14:26  --------------------------------------------------------  IN: 540 mL / OUT: 0 mL / NET: 540 mL        PHYSICAL EXAM:  GENERAL: NAD, well-developed  HEAD:  Atraumatic, Normocephalic  EYES: EOMI, PERRLA, conjunctiva and sclera clear  NECK: Supple, No JVD  CHEST/LUNG: Clear to auscultation bilaterally; No wheeze  HEART: Regular rate and rhythm; No murmurs, rubs, or gallops  ABDOMEN: Soft, Nontender, Nondistended; Bowel sounds present  EXTREMITIES:  2+ Peripheral Pulses, No clubbing, cyanosis, or edema  PSYCH: AAOx3  NEUROLOGY: non-focal  SKIN: No rashes or lesions    LABS:                        13.9   6.24  )-----------( 152      ( 23 Oct 2022 06:14 )             40.8           PTT - ( 23 Oct 2022 10:44 )  PTT:74.0 sec          RADIOLOGY & ADDITIONAL TESTS:    Imaging Personally Reviewed:    Consultant(s) Notes Reviewed:      Care Discussed with Consultants/Other Providers:

## 2022-10-23 NOTE — PROGRESS NOTE ADULT - ASSESSMENT
Patient is a 52 year old Citizen of [place] male, current 0.5 ppd smoker, with no know significant past medical history who presented to the ED at [facility] after being transferred from [facility] with abnormal ECG findings. The patient presented to [facility] earlier today with a 1 year history of exertional chest pain, consistent with typical angina, worse in the last 24 hours. Troponin T at [facility] WNL, hsTroponin ~2 hours later on admission labs WNL. On arrival ECG demonstrated ST segmental elevations in anterolateral leads with Q waves suggestive of prior infarct with ongoing symptoms. ECG did not meet criteria for STEMI. He was given brilenta 180 mg PO, loaded with IV heparin and started on heparin ggt. Patient was consented for cardiac catheterization. Patient to be admitted for management of NSTEMI.  NSTEMI  Triple vessel CAD: LHC showing prox LAD , mid Cx 50%, prox RCA 40%, RPL    - on the schedule for CABG on 10/24  - s/p  + Brilinta 360 mg total on admission; continue ASA 81 qd,   - cardiac viability study done, some viable , inferior and apical not viable  - continue Atorvastatin 40 qHS  - BP is soft, HR below 60, unable to start BB  - TTE: EF 45%, on heparin drip for apical thrombus on TTE

## 2022-10-23 NOTE — PRE-ANESTHESIA EVALUATION ADULT - NSRADCARDRESULTSFT_GEN_ALL_CORE
< from: Cardiac Catheterization (10.17.22 @ 16:08) >    Coronary Angiography   The coronary circulation is right dominant.      LM   Left main artery: Angiography shows minor irregularities.      LAD   Mid left anterior descending: There is a 100 % stenosis.      CX   Proximal circumflex: There is a 50 % stenosis.      RCA   Proximal right coronary artery: There is a 70 % stenosis. First right  posterolateral: The segment is large. There is a 100 %  stenosis.     < end of copied text >    < from: TTE with Doppler (w/Cont) (10.18.22 @ 07:40) >    Conclusions:  Endocardial visualization enhanced with intravenous  injection of Ultrasonic Enhancing Agent (Definity).  Mild left ventricular enlargement.  Estiamted ejection fraction 45%. Large area of apical  akinesis.  An apical thrombus is present.    < end of copied text >    < from: MR Cardiac w/wo IV Cont (10.19.22 @ 10:07) >    IMPRESSION:  1. Normal left ventricular size. Mildly depressed left ventricular   function. LVEF: 45%. Findings compatible with ischemic cardiomyopathy   with LAD and RPL territory infarct. Late gadolinium enhancement pattern   suggests viable myocardium in the basal to mid septum, although nonviable   myocardium at the apex, which is akinetic and thinned with a 1.7 cm   apical thrombus. Unlikely viability in the basal inferolateral wall.    2. Normal right ventricular size and function. RVEF: 68%.    3. No significant valvular abnormalities.    4. Normal atrial size.    < end of copied text >

## 2022-10-23 NOTE — PROGRESS NOTE ADULT - SUBJECTIVE AND OBJECTIVE BOX
Cardiac Surgery Pre-op Note:  CABG    CC: Patient is a 52y old  Male who presents with a chief complaint of acute MI, NSTEMI (22 Oct 2022 15:15)                                                                                                                 Surgeon:  Dr Cain    Procedure: (Date) (Procedure)  10/24      CABG    Allergies    No Known Allergies    Intolerances        HPI:   51yo M daily smoker 1ppd, [place] presents today with chest pain from [facility] w abn EKG. he has Radiation to the L arm. Woke up with pain. Vomited x3. NBNB. States he has had intermittent chest pain ongoing for the past year. Has never seen a cardiologist or had EST/echo performed. EKG in the D w Anterolateral ST -T abn. going for [facility] as per cardiology team (17 Oct 2022 14:25)      PAST MEDICAL & SURGICAL HISTORY:      MEDICATIONS  (STANDING):  ascorbic acid 2000 milliGRAM(s) Oral at bedtime  aspirin enteric coated 81 milliGRAM(s) Oral daily  atorvastatin 40 milliGRAM(s) Oral at bedtime  chlorhexidine 0.12% Liquid 5 milliLiter(s) Swish and Spit once  chlorhexidine 4% Liquid 1 Application(s) Topical once  heparin  Infusion. 1100 Unit(s)/Hr (11 mL/Hr) IV Continuous <Continuous>  nicotine -   7 mG/24Hr(s) Patch 1 Patch Transdermal daily    MEDICATIONS  (PRN):  heparin   Injectable 5500 Unit(s) IV Push every 6 hours PRN For aPTT less than 40  heparin   Injectable 2500 Unit(s) IV Push every 6 hours PRN For aPTT between 40 - 57        Labs:                        15.0   6.39  )-----------( 170      ( 22 Oct 2022 17:47 )             45.1           PTT - ( 23 Oct 2022 01:55 )  PTT:61.4 sec    Blood Type:   HGB A1C:   Prealbumin:   Pro-BNP: Serum Pro-Brain Natriuretic Peptide: 331 pg/mL (10-19 @ 05:39)    Thyroid Panel: 10-19 @ 05:39/2.55  --/6.8/92  10-18 @ 01:19/1.61  --/--/--    MRSA: MRSA PCR Result.: NotDetec (10-18 @ 23:10)   / MSSA:       CXR:     EKG:    Carotid Duplex:          Echocardiogram:    Cardiac catheterization:      Gen: WN/WD NAD  Neuro: AAOx3, nonfocal  Pulm: CTA B/L  CV: RRR, S1S2  Abd: Soft, NT, ND +BS  Ext: No edema, + peripheral pulses      Pt has AICD/PPM [ ] Yes  [ ] No             Brand Name:  Pre-op Beta Blocker ordered within 24 hrs of surgery (CABG ONLY)?  [ ] Yes  [ ] No  If not, Why?  Type & Cross  [ ] Yes  [ ] No  NPO after Midnight [ ] Yes    Pre-op ABX ordered, to be taped on chart:  [ ] Yes  [ ] No     Hibiclens/Peridex ordered [ ] Yes  [ ] No  Intraop on Hold: PRBCs, CXR, JAYNA [ ]   Consent obtained  [ ] Yes  [ ] No   Cardiac Surgery Pre-op Note:  CABG    CC: Patient is a 52y old  Male who presents with a chief complaint of acute MI, NSTEMI (22 Oct 2022 15:15)                                                                                                                 Surgeon:  Dr Cain    Procedure: (Date) (Procedure)  10/24      CABG    Allergies    No Known Allergies    Intolerances        HPI:   51yo M daily smoker 1ppd, [place] presents today with chest pain from [place] [facility] w abn EKG. he has Radiation to the L arm. Woke up with pain. Vomited x3. NBNB. States he has had intermittent chest pain ongoing for the past year. Has never seen a cardiologist or had EST/echo performed. EKG in the D w Anterolateral ST -T abn. going for [facility] as per cardiology team (17 Oct 2022 14:25)      PAST MEDICAL & SURGICAL HISTORY:      MEDICATIONS  (STANDING):  ascorbic acid 2000 milliGRAM(s) Oral at bedtime  aspirin enteric coated 81 milliGRAM(s) Oral daily  atorvastatin 40 milliGRAM(s) Oral at bedtime  chlorhexidine 0.12% Liquid 5 milliLiter(s) Swish and Spit once  chlorhexidine 4% Liquid 1 Application(s) Topical once  heparin  Infusion. 1100 Unit(s)/Hr (11 mL/Hr) IV Continuous <Continuous>  nicotine -   7 mG/24Hr(s) Patch 1 Patch Transdermal daily    MEDICATIONS  (PRN):  heparin   Injectable 5500 Unit(s) IV Push every 6 hours PRN For aPTT less than 40  heparin   Injectable 2500 Unit(s) IV Push every 6 hours PRN For aPTT between 40 - 57        Labs:                        15.0   6.39  )-----------( 170      ( 22 Oct 2022 17:47 )             45.1           PTT - ( 23 Oct 2022 01:55 )  PTT:61.4 sec      Pro-BNP: Serum Pro-Brain Natriuretic Peptide: 331 pg/mL (10-19 @ 05:39)    Thyroid Panel: 10-19 @ 05:39/2.55  --/6.8/92  10-18 @ 01:19/1.61  --/--/--    MRSA: MRSA PCR Result.: NotDetec (10-18 @ 23:10)   / MSSA:       CXR:     clear  EKG:Diagnosis Line NORMAL SINUS RHYTHM  POSSIBLE INFERIOR INFARCT , AGE UNDETERMINED  CANNOT RULE OUT ANTERIOR INFARCT , AGE UNDETERMINED  ST&T WAVE ABNORMALITY, CONSIDER INFEROLATERAL ISCHEMIA  ABNORMAL ECG    Carotid Duplex:  IMPRESSION: No significant hemodynamic stenosis of either carotid artery.        Echocardiogram:Conclusions:  Endocardial visualization enhanced with intravenous  injection of Ultrasonic Enhancing Agent (Definity).  Mild left ventricular enlargement.  Estiamted ejection fraction 45%. Large area of apical  akinesis.  An apical thrombus is present.  Cardiac catheterization:Coronary Angiography   The coronary circulation is right dominant.      LM   Left main artery: Angiography shows minor irregularities.      LAD   Mid left anterior descending: There is a 100 % stenosis.      CX   Proximal circumflex: There is a 50 % stenosis.      RCA   Proximal right coronary artery: There is a 70 % stenosis. First right  posterolateral: The segment is large. There is a 100 %  stenosis.        Gen: WN/WD NAD  Neuro: AAOx3, nonfocal  Pulm: CTA B/L  CV: RRR, S1S2  Abd: Soft, NT, ND +BS  Ext: No edema, + peripheral pulses      Pt has AICD/PPM  [ ] No             Pre-op Beta Blocker ordered within 24 hrs of surgery (CABG ONLY)?  [ ] Yes   NPO after Midnight [ ] Yes    Pre-op ABX ordered, to be taped on chart:  [ ] Yes  [ ] No     Hibiclens/Peridex ordered [ ] Yes  [ ] No  Intraop on Hold: PRBCs, CXR, JAYNA [ ]   Consent obtained  [ ] Yes  [ ] No

## 2022-10-23 NOTE — PRE-ANESTHESIA EVALUATION ADULT - NSANTHOSAYNRD_GEN_A_CORE
No. MACO screening performed.  STOP BANG Legend: 0-2 = LOW Risk; 3-4 = INTERMEDIATE Risk; 5-8 = HIGH Risk

## 2022-10-23 NOTE — PROGRESS NOTE ADULT - PROBLEM SELECTOR PLAN 1
NPO after midnight for CABG   hold brillinta  on hep gtt for LV thrombus NPO after midnight for CABG   hold brillinta  on hep gtt for LV thrombus    hold on call to OR monday

## 2022-10-24 ENCOUNTER — APPOINTMENT (OUTPATIENT)
Dept: CARDIOTHORACIC SURGERY | Facility: CLINIC | Age: 52
End: 2022-10-24

## 2022-10-24 LAB
ALBUMIN SERPL ELPH-MCNC: 4 G/DL — SIGNIFICANT CHANGE UP (ref 3.3–5)
ALP SERPL-CCNC: 36 U/L — LOW (ref 40–120)
ALT FLD-CCNC: 82 U/L — HIGH (ref 10–45)
ANION GAP SERPL CALC-SCNC: 11 MMOL/L — SIGNIFICANT CHANGE UP (ref 5–17)
ANION GAP SERPL CALC-SCNC: 11 MMOL/L — SIGNIFICANT CHANGE UP (ref 5–17)
APTT BLD: 145.8 SEC — CRITICAL HIGH (ref 27.5–35.5)
APTT BLD: 32.2 SEC — SIGNIFICANT CHANGE UP (ref 27.5–35.5)
AST SERPL-CCNC: 66 U/L — HIGH (ref 10–40)
BASE EXCESS BLDV CALC-SCNC: -0.2 MMOL/L — SIGNIFICANT CHANGE UP (ref -2–3)
BASE EXCESS BLDV CALC-SCNC: -2.2 MMOL/L — LOW (ref -2–3)
BASE EXCESS BLDV CALC-SCNC: 1.3 MMOL/L — SIGNIFICANT CHANGE UP (ref -2–3)
BASE EXCESS BLDV CALC-SCNC: 3.3 MMOL/L — HIGH (ref -2–3)
BASOPHILS # BLD AUTO: 0.05 K/UL — SIGNIFICANT CHANGE UP (ref 0–0.2)
BASOPHILS NFR BLD AUTO: 0.3 % — SIGNIFICANT CHANGE UP (ref 0–2)
BILIRUB SERPL-MCNC: 0.9 MG/DL — SIGNIFICANT CHANGE UP (ref 0.2–1.2)
BLOOD GAS VENOUS - CREATININE: SIGNIFICANT CHANGE UP MG/DL (ref 0.5–1.3)
BLOOD GAS VENOUS - CREATININE: SIGNIFICANT CHANGE UP MG/DL (ref 0.5–1.3)
BUN SERPL-MCNC: 13 MG/DL — SIGNIFICANT CHANGE UP (ref 7–23)
BUN SERPL-MCNC: 17 MG/DL — SIGNIFICANT CHANGE UP (ref 7–23)
CA-I SERPL-SCNC: 0.84 MMOL/L — LOW (ref 1.15–1.33)
CA-I SERPL-SCNC: 0.94 MMOL/L — LOW (ref 1.15–1.33)
CA-I SERPL-SCNC: 1.17 MMOL/L — SIGNIFICANT CHANGE UP (ref 1.15–1.33)
CALCIUM SERPL-MCNC: 9.1 MG/DL — SIGNIFICANT CHANGE UP (ref 8.4–10.5)
CALCIUM SERPL-MCNC: 9.5 MG/DL — SIGNIFICANT CHANGE UP (ref 8.4–10.5)
CHLORIDE BLDV-SCNC: 102 MMOL/L — SIGNIFICANT CHANGE UP (ref 96–108)
CHLORIDE BLDV-SCNC: 102 MMOL/L — SIGNIFICANT CHANGE UP (ref 96–108)
CHLORIDE BLDV-SCNC: 103 MMOL/L — SIGNIFICANT CHANGE UP (ref 96–108)
CHLORIDE SERPL-SCNC: 103 MMOL/L — SIGNIFICANT CHANGE UP (ref 96–108)
CHLORIDE SERPL-SCNC: 104 MMOL/L — SIGNIFICANT CHANGE UP (ref 96–108)
CK MB BLD-MCNC: 10.8 % — HIGH (ref 0–3.5)
CK MB CFR SERPL CALC: 38.6 NG/ML — HIGH (ref 0–6.7)
CK SERPL-CCNC: 358 U/L — HIGH (ref 30–200)
CO2 BLDV-SCNC: 26 MMOL/L — SIGNIFICANT CHANGE UP (ref 22–26)
CO2 BLDV-SCNC: 26 MMOL/L — SIGNIFICANT CHANGE UP (ref 22–26)
CO2 BLDV-SCNC: 27 MMOL/L — HIGH (ref 22–26)
CO2 BLDV-SCNC: 30 MMOL/L — HIGH (ref 22–26)
CO2 SERPL-SCNC: 23 MMOL/L — SIGNIFICANT CHANGE UP (ref 22–31)
CO2 SERPL-SCNC: 24 MMOL/L — SIGNIFICANT CHANGE UP (ref 22–31)
CREAT SERPL-MCNC: 0.69 MG/DL — SIGNIFICANT CHANGE UP (ref 0.5–1.3)
CREAT SERPL-MCNC: 0.98 MG/DL — SIGNIFICANT CHANGE UP (ref 0.5–1.3)
EGFR: 111 ML/MIN/1.73M2 — SIGNIFICANT CHANGE UP
EGFR: 93 ML/MIN/1.73M2 — SIGNIFICANT CHANGE UP
EOSINOPHIL # BLD AUTO: 0.11 K/UL — SIGNIFICANT CHANGE UP (ref 0–0.5)
EOSINOPHIL NFR BLD AUTO: 0.7 % — SIGNIFICANT CHANGE UP (ref 0–6)
FIBRINOGEN PPP-MCNC: 257 MG/DL — LOW (ref 330–520)
GAS PNL BLDA: SIGNIFICANT CHANGE UP
GAS PNL BLDV: 133 MMOL/L — LOW (ref 136–145)
GAS PNL BLDV: 133 MMOL/L — LOW (ref 136–145)
GAS PNL BLDV: 134 MMOL/L — LOW (ref 136–145)
GAS PNL BLDV: SIGNIFICANT CHANGE UP
GLUCOSE BLDC GLUCOMTR-MCNC: 146 MG/DL — HIGH (ref 70–99)
GLUCOSE BLDC GLUCOMTR-MCNC: 148 MG/DL — HIGH (ref 70–99)
GLUCOSE BLDC GLUCOMTR-MCNC: 191 MG/DL — HIGH (ref 70–99)
GLUCOSE BLDV-MCNC: 102 MG/DL — HIGH (ref 70–99)
GLUCOSE BLDV-MCNC: 114 MG/DL — HIGH (ref 70–99)
GLUCOSE BLDV-MCNC: 119 MG/DL — HIGH (ref 70–99)
GLUCOSE SERPL-MCNC: 102 MG/DL — HIGH (ref 70–99)
GLUCOSE SERPL-MCNC: 147 MG/DL — HIGH (ref 70–99)
HCO3 BLDV-SCNC: 24 MMOL/L — SIGNIFICANT CHANGE UP (ref 22–29)
HCO3 BLDV-SCNC: 25 MMOL/L — SIGNIFICANT CHANGE UP (ref 22–29)
HCO3 BLDV-SCNC: 26 MMOL/L — SIGNIFICANT CHANGE UP (ref 22–29)
HCO3 BLDV-SCNC: 28 MMOL/L — SIGNIFICANT CHANGE UP (ref 22–29)
HCT VFR BLD CALC: 39.4 % — SIGNIFICANT CHANGE UP (ref 39–50)
HCT VFR BLD CALC: 46.2 % — SIGNIFICANT CHANGE UP (ref 39–50)
HCT VFR BLDA CALC: 34 % — LOW (ref 39–51)
HCT VFR BLDA CALC: 36 % — LOW (ref 39–51)
HCT VFR BLDA CALC: 36 % — LOW (ref 39–51)
HGB BLD CALC-MCNC: 11.3 G/DL — LOW (ref 12.6–17.4)
HGB BLD CALC-MCNC: 12 G/DL — LOW (ref 12.6–17.4)
HGB BLD CALC-MCNC: 12.1 G/DL — LOW (ref 12.6–17.4)
HGB BLD-MCNC: 13.6 G/DL — SIGNIFICANT CHANGE UP (ref 13–17)
HGB BLD-MCNC: 15.3 G/DL — SIGNIFICANT CHANGE UP (ref 13–17)
HOROWITZ INDEX BLDV+IHG-RTO: 32 — SIGNIFICANT CHANGE UP
IMM GRANULOCYTES NFR BLD AUTO: 1.4 % — HIGH (ref 0–0.9)
INR BLD: 1.24 RATIO — HIGH (ref 0.88–1.16)
LACTATE BLDV-MCNC: 1.2 MMOL/L — SIGNIFICANT CHANGE UP (ref 0.5–2)
LACTATE BLDV-MCNC: 1.5 MMOL/L — SIGNIFICANT CHANGE UP (ref 0.5–2)
LACTATE BLDV-MCNC: 2 MMOL/L — SIGNIFICANT CHANGE UP (ref 0.5–2)
LYMPHOCYTES # BLD AUTO: 1.56 K/UL — SIGNIFICANT CHANGE UP (ref 1–3.3)
LYMPHOCYTES # BLD AUTO: 10.6 % — LOW (ref 13–44)
MAGNESIUM SERPL-MCNC: 2.5 MG/DL — SIGNIFICANT CHANGE UP (ref 1.6–2.6)
MCHC RBC-ENTMCNC: 31.7 PG — SIGNIFICANT CHANGE UP (ref 27–34)
MCHC RBC-ENTMCNC: 31.9 PG — SIGNIFICANT CHANGE UP (ref 27–34)
MCHC RBC-ENTMCNC: 33.1 GM/DL — SIGNIFICANT CHANGE UP (ref 32–36)
MCHC RBC-ENTMCNC: 34.5 GM/DL — SIGNIFICANT CHANGE UP (ref 32–36)
MCV RBC AUTO: 92.5 FL — SIGNIFICANT CHANGE UP (ref 80–100)
MCV RBC AUTO: 95.7 FL — SIGNIFICANT CHANGE UP (ref 80–100)
MONOCYTES # BLD AUTO: 0.7 K/UL — SIGNIFICANT CHANGE UP (ref 0–0.9)
MONOCYTES NFR BLD AUTO: 4.7 % — SIGNIFICANT CHANGE UP (ref 2–14)
NEUTROPHILS # BLD AUTO: 12.13 K/UL — HIGH (ref 1.8–7.4)
NEUTROPHILS NFR BLD AUTO: 82.3 % — HIGH (ref 43–77)
NRBC # BLD: 0 /100 WBCS — SIGNIFICANT CHANGE UP (ref 0–0)
NRBC # BLD: 0 /100 WBCS — SIGNIFICANT CHANGE UP (ref 0–0)
PCO2 BLDV: 40 MMHG — LOW (ref 42–55)
PCO2 BLDV: 41 MMHG — LOW (ref 42–55)
PCO2 BLDV: 45 MMHG — SIGNIFICANT CHANGE UP (ref 42–55)
PCO2 BLDV: 47 MMHG — SIGNIFICANT CHANGE UP (ref 42–55)
PH BLDV: 7.32 — SIGNIFICANT CHANGE UP (ref 7.32–7.43)
PH BLDV: 7.39 — SIGNIFICANT CHANGE UP (ref 7.32–7.43)
PH BLDV: 7.41 — SIGNIFICANT CHANGE UP (ref 7.32–7.43)
PH BLDV: 7.42 — SIGNIFICANT CHANGE UP (ref 7.32–7.43)
PHOSPHATE SERPL-MCNC: 3.6 MG/DL — SIGNIFICANT CHANGE UP (ref 2.5–4.5)
PLATELET # BLD AUTO: 105 K/UL — LOW (ref 150–400)
PLATELET # BLD AUTO: 162 K/UL — SIGNIFICANT CHANGE UP (ref 150–400)
PO2 BLDV: 40 MMHG — SIGNIFICANT CHANGE UP (ref 25–45)
PO2 BLDV: 64 MMHG — HIGH (ref 25–45)
PO2 BLDV: 90 MMHG — HIGH (ref 25–45)
PO2 BLDV: 96 MMHG — HIGH (ref 25–45)
POTASSIUM BLDV-SCNC: 5.3 MMOL/L — HIGH (ref 3.5–5.1)
POTASSIUM BLDV-SCNC: 5.9 MMOL/L — HIGH (ref 3.5–5.1)
POTASSIUM BLDV-SCNC: 5.9 MMOL/L — HIGH (ref 3.5–5.1)
POTASSIUM SERPL-MCNC: 4.2 MMOL/L — SIGNIFICANT CHANGE UP (ref 3.5–5.3)
POTASSIUM SERPL-MCNC: 4.5 MMOL/L — SIGNIFICANT CHANGE UP (ref 3.5–5.3)
POTASSIUM SERPL-SCNC: 4.2 MMOL/L — SIGNIFICANT CHANGE UP (ref 3.5–5.3)
POTASSIUM SERPL-SCNC: 4.5 MMOL/L — SIGNIFICANT CHANGE UP (ref 3.5–5.3)
PROT SERPL-MCNC: 6.1 G/DL — SIGNIFICANT CHANGE UP (ref 6–8.3)
PROTHROM AB SERPL-ACNC: 14.3 SEC — HIGH (ref 10.5–13.4)
RBC # BLD: 4.26 M/UL — SIGNIFICANT CHANGE UP (ref 4.2–5.8)
RBC # BLD: 4.83 M/UL — SIGNIFICANT CHANGE UP (ref 4.2–5.8)
RBC # FLD: 11.8 % — SIGNIFICANT CHANGE UP (ref 10.3–14.5)
RBC # FLD: 11.9 % — SIGNIFICANT CHANGE UP (ref 10.3–14.5)
SAO2 % BLDV: 69.3 % — SIGNIFICANT CHANGE UP (ref 67–88)
SAO2 % BLDV: 94 % — HIGH (ref 67–88)
SAO2 % BLDV: 98.5 % — HIGH (ref 67–88)
SAO2 % BLDV: 98.9 % — HIGH (ref 67–88)
SODIUM SERPL-SCNC: 137 MMOL/L — SIGNIFICANT CHANGE UP (ref 135–145)
SODIUM SERPL-SCNC: 139 MMOL/L — SIGNIFICANT CHANGE UP (ref 135–145)
TROPONIN T, HIGH SENSITIVITY RESULT: 504 NG/L — HIGH (ref 0–51)
WBC # BLD: 14.75 K/UL — HIGH (ref 3.8–10.5)
WBC # BLD: 6.72 K/UL — SIGNIFICANT CHANGE UP (ref 3.8–10.5)
WBC # FLD AUTO: 14.75 K/UL — HIGH (ref 3.8–10.5)
WBC # FLD AUTO: 6.72 K/UL — SIGNIFICANT CHANGE UP (ref 3.8–10.5)

## 2022-10-24 PROCEDURE — 99291 CRITICAL CARE FIRST HOUR: CPT

## 2022-10-24 PROCEDURE — 33534 CABG ARTERIAL TWO: CPT | Mod: AS

## 2022-10-24 PROCEDURE — 33508 ENDOSCOPIC VEIN HARVEST: CPT | Mod: 59

## 2022-10-24 PROCEDURE — 33534 CABG ARTERIAL TWO: CPT

## 2022-10-24 PROCEDURE — 71045 X-RAY EXAM CHEST 1 VIEW: CPT | Mod: 26

## 2022-10-24 PROCEDURE — 33517 CABG ARTERY-VEIN SINGLE: CPT | Mod: AS

## 2022-10-24 PROCEDURE — 33517 CABG ARTERY-VEIN SINGLE: CPT

## 2022-10-24 PROCEDURE — 35600 OPEN HRV UXTR ART 1 SGM CAB: CPT

## 2022-10-24 PROCEDURE — 93010 ELECTROCARDIOGRAM REPORT: CPT

## 2022-10-24 PROCEDURE — 99292 CRITICAL CARE ADDL 30 MIN: CPT

## 2022-10-24 DEVICE — CHEST DRAIN THORACIC ARGYLE PVC 28FR STRAIGHT: Type: IMPLANTABLE DEVICE | Status: FUNCTIONAL

## 2022-10-24 DEVICE — LIGATING CLIPS WECK HORIZON SMALL-WIDE (RED) 24: Type: IMPLANTABLE DEVICE | Status: FUNCTIONAL

## 2022-10-24 DEVICE — CANNULA ARTERIAL CURVED TIP 22FR X 3/8": Type: IMPLANTABLE DEVICE | Status: FUNCTIONAL

## 2022-10-24 DEVICE — CANNULA ARTERIOTOMY 2MM X 1.3": Type: IMPLANTABLE DEVICE | Status: FUNCTIONAL

## 2022-10-24 DEVICE — CANNULA VESSEL 3MM BLUNT TIP CLEAR 1-WAY VALVE: Type: IMPLANTABLE DEVICE | Status: FUNCTIONAL

## 2022-10-24 DEVICE — SURGICEL NU-KNIT 6 X 9": Type: IMPLANTABLE DEVICE | Status: FUNCTIONAL

## 2022-10-24 DEVICE — CANNULA AORTIC ROOT WITH VENT LINE 12G X 14CM FLANGED: Type: IMPLANTABLE DEVICE | Status: FUNCTIONAL

## 2022-10-24 DEVICE — CANNULA VENOUS 3 STAGE STANDARD 29/37/37FR X 1/2" NON-VENTED: Type: IMPLANTABLE DEVICE | Status: FUNCTIONAL

## 2022-10-24 DEVICE — PACING WIRE WHITE M-25 WINGED WIRE 37MM X 89MM: Type: IMPLANTABLE DEVICE | Status: FUNCTIONAL

## 2022-10-24 DEVICE — KIT CVC 2LUM MAC 9FR CHG: Type: IMPLANTABLE DEVICE | Status: FUNCTIONAL

## 2022-10-24 DEVICE — MEDIASTINAL CATH DRAIN 9MM: Type: IMPLANTABLE DEVICE | Status: FUNCTIONAL

## 2022-10-24 DEVICE — KIT A-LINE 1LUM 20G X 12CM SAFE KIT: Type: IMPLANTABLE DEVICE | Status: FUNCTIONAL

## 2022-10-24 DEVICE — SURGICEL 2 X 14": Type: IMPLANTABLE DEVICE | Status: FUNCTIONAL

## 2022-10-24 DEVICE — LIGATING CLIPS WECK HORIZON MEDIUM (BLUE) 24: Type: IMPLANTABLE DEVICE | Status: FUNCTIONAL

## 2022-10-24 RX ORDER — MEPERIDINE HYDROCHLORIDE 50 MG/ML
25 INJECTION INTRAMUSCULAR; INTRAVENOUS; SUBCUTANEOUS ONCE
Refills: 0 | Status: DISCONTINUED | OUTPATIENT
Start: 2022-10-24 | End: 2022-10-24

## 2022-10-24 RX ORDER — SODIUM CHLORIDE 9 MG/ML
500 INJECTION, SOLUTION INTRAVENOUS ONCE
Refills: 0 | Status: COMPLETED | OUTPATIENT
Start: 2022-10-24 | End: 2022-10-24

## 2022-10-24 RX ORDER — DEXMEDETOMIDINE HYDROCHLORIDE IN 0.9% SODIUM CHLORIDE 4 UG/ML
1 INJECTION INTRAVENOUS
Qty: 200 | Refills: 0 | Status: DISCONTINUED | OUTPATIENT
Start: 2022-10-24 | End: 2022-10-24

## 2022-10-24 RX ORDER — CHLORHEXIDINE GLUCONATE 213 G/1000ML
5 SOLUTION TOPICAL
Refills: 0 | Status: DISCONTINUED | OUTPATIENT
Start: 2022-10-24 | End: 2022-10-24

## 2022-10-24 RX ORDER — ACETAMINOPHEN 500 MG
650 TABLET ORAL EVERY 6 HOURS
Refills: 0 | Status: COMPLETED | OUTPATIENT
Start: 2022-10-24 | End: 2022-10-27

## 2022-10-24 RX ORDER — POTASSIUM CHLORIDE 20 MEQ
10 PACKET (EA) ORAL
Refills: 0 | Status: DISCONTINUED | OUTPATIENT
Start: 2022-10-24 | End: 2022-10-26

## 2022-10-24 RX ORDER — AMIODARONE HYDROCHLORIDE 400 MG/1
400 TABLET ORAL
Refills: 0 | Status: COMPLETED | OUTPATIENT
Start: 2022-10-24 | End: 2022-10-27

## 2022-10-24 RX ORDER — ASPIRIN/CALCIUM CARB/MAGNESIUM 324 MG
81 TABLET ORAL ONCE
Refills: 0 | Status: COMPLETED | OUTPATIENT
Start: 2022-10-24 | End: 2022-10-24

## 2022-10-24 RX ORDER — SODIUM CHLORIDE 9 MG/ML
1000 INJECTION INTRAMUSCULAR; INTRAVENOUS; SUBCUTANEOUS
Refills: 0 | Status: DISCONTINUED | OUTPATIENT
Start: 2022-10-24 | End: 2022-10-29

## 2022-10-24 RX ORDER — POTASSIUM CHLORIDE 20 MEQ
10 PACKET (EA) ORAL
Refills: 0 | Status: COMPLETED | OUTPATIENT
Start: 2022-10-24 | End: 2022-10-24

## 2022-10-24 RX ORDER — OXYCODONE HYDROCHLORIDE 5 MG/1
10 TABLET ORAL EVERY 4 HOURS
Refills: 0 | Status: DISCONTINUED | OUTPATIENT
Start: 2022-10-24 | End: 2022-10-29

## 2022-10-24 RX ORDER — CHLORHEXIDINE GLUCONATE 213 G/1000ML
1 SOLUTION TOPICAL DAILY
Refills: 0 | Status: DISCONTINUED | OUTPATIENT
Start: 2022-10-24 | End: 2022-10-29

## 2022-10-24 RX ORDER — DEXTROSE 50 % IN WATER 50 %
25 SYRINGE (ML) INTRAVENOUS
Refills: 0 | Status: DISCONTINUED | OUTPATIENT
Start: 2022-10-24 | End: 2022-10-26

## 2022-10-24 RX ORDER — ASPIRIN/CALCIUM CARB/MAGNESIUM 324 MG
81 TABLET ORAL DAILY
Refills: 0 | Status: DISCONTINUED | OUTPATIENT
Start: 2022-10-24 | End: 2022-10-29

## 2022-10-24 RX ORDER — INSULIN HUMAN 100 [IU]/ML
3 INJECTION, SOLUTION SUBCUTANEOUS
Qty: 100 | Refills: 0 | Status: DISCONTINUED | OUTPATIENT
Start: 2022-10-24 | End: 2022-10-26

## 2022-10-24 RX ORDER — GABAPENTIN 400 MG/1
100 CAPSULE ORAL EVERY 8 HOURS
Refills: 0 | Status: COMPLETED | OUTPATIENT
Start: 2022-10-24 | End: 2022-10-29

## 2022-10-24 RX ORDER — ALBUMIN HUMAN 25 %
250 VIAL (ML) INTRAVENOUS ONCE
Refills: 0 | Status: COMPLETED | OUTPATIENT
Start: 2022-10-24 | End: 2022-10-24

## 2022-10-24 RX ORDER — FAMOTIDINE 10 MG/ML
20 INJECTION INTRAVENOUS EVERY 24 HOURS
Refills: 0 | Status: DISCONTINUED | OUTPATIENT
Start: 2022-10-24 | End: 2022-10-25

## 2022-10-24 RX ORDER — ATORVASTATIN CALCIUM 80 MG/1
80 TABLET, FILM COATED ORAL AT BEDTIME
Refills: 0 | Status: DISCONTINUED | OUTPATIENT
Start: 2022-10-24 | End: 2022-10-29

## 2022-10-24 RX ORDER — NICARDIPINE HYDROCHLORIDE 30 MG/1
5 CAPSULE, EXTENDED RELEASE ORAL
Qty: 40 | Refills: 0 | Status: DISCONTINUED | OUTPATIENT
Start: 2022-10-24 | End: 2022-10-26

## 2022-10-24 RX ORDER — ASCORBIC ACID 60 MG
500 TABLET,CHEWABLE ORAL
Refills: 0 | Status: COMPLETED | OUTPATIENT
Start: 2022-10-24 | End: 2022-10-29

## 2022-10-24 RX ORDER — SENNA PLUS 8.6 MG/1
2 TABLET ORAL AT BEDTIME
Refills: 0 | Status: DISCONTINUED | OUTPATIENT
Start: 2022-10-25 | End: 2022-10-29

## 2022-10-24 RX ORDER — ASPIRIN/CALCIUM CARB/MAGNESIUM 324 MG
300 TABLET ORAL ONCE
Refills: 0 | Status: DISCONTINUED | OUTPATIENT
Start: 2022-10-24 | End: 2022-10-24

## 2022-10-24 RX ORDER — CHLORHEXIDINE GLUCONATE 213 G/1000ML
15 SOLUTION TOPICAL EVERY 12 HOURS
Refills: 0 | Status: DISCONTINUED | OUTPATIENT
Start: 2022-10-24 | End: 2022-10-24

## 2022-10-24 RX ORDER — POLYETHYLENE GLYCOL 3350 17 G/17G
17 POWDER, FOR SOLUTION ORAL DAILY
Refills: 0 | Status: DISCONTINUED | OUTPATIENT
Start: 2022-10-25 | End: 2022-10-29

## 2022-10-24 RX ORDER — ACETAMINOPHEN 500 MG
650 TABLET ORAL EVERY 6 HOURS
Refills: 0 | Status: DISCONTINUED | OUTPATIENT
Start: 2022-10-27 | End: 2022-10-29

## 2022-10-24 RX ORDER — OXYCODONE HYDROCHLORIDE 5 MG/1
5 TABLET ORAL EVERY 4 HOURS
Refills: 0 | Status: DISCONTINUED | OUTPATIENT
Start: 2022-10-24 | End: 2022-10-29

## 2022-10-24 RX ORDER — HYDROMORPHONE HYDROCHLORIDE 2 MG/ML
0.5 INJECTION INTRAMUSCULAR; INTRAVENOUS; SUBCUTANEOUS EVERY 6 HOURS
Refills: 0 | Status: DISCONTINUED | OUTPATIENT
Start: 2022-10-24 | End: 2022-10-25

## 2022-10-24 RX ORDER — CEFUROXIME AXETIL 250 MG
1500 TABLET ORAL EVERY 8 HOURS
Refills: 0 | Status: COMPLETED | OUTPATIENT
Start: 2022-10-24 | End: 2022-10-25

## 2022-10-24 RX ORDER — DEXTROSE 50 % IN WATER 50 %
50 SYRINGE (ML) INTRAVENOUS
Refills: 0 | Status: DISCONTINUED | OUTPATIENT
Start: 2022-10-24 | End: 2022-10-26

## 2022-10-24 RX ADMIN — Medication 1000 MILLIGRAM(S): at 05:32

## 2022-10-24 RX ADMIN — Medication 100 MILLIEQUIVALENT(S): at 13:59

## 2022-10-24 RX ADMIN — Medication 1500 MILLILITER(S): at 14:00

## 2022-10-24 RX ADMIN — OXYCODONE HYDROCHLORIDE 10 MILLIGRAM(S): 5 TABLET ORAL at 18:38

## 2022-10-24 RX ADMIN — Medication 12.5 MILLIGRAM(S): at 04:51

## 2022-10-24 RX ADMIN — Medication 100 MILLIEQUIVALENT(S): at 17:35

## 2022-10-24 RX ADMIN — Medication 500 MILLILITER(S): at 16:11

## 2022-10-24 RX ADMIN — ATORVASTATIN CALCIUM 80 MILLIGRAM(S): 80 TABLET, FILM COATED ORAL at 21:45

## 2022-10-24 RX ADMIN — HYDROMORPHONE HYDROCHLORIDE 0.5 MILLIGRAM(S): 2 INJECTION INTRAMUSCULAR; INTRAVENOUS; SUBCUTANEOUS at 16:25

## 2022-10-24 RX ADMIN — OXYCODONE HYDROCHLORIDE 10 MILLIGRAM(S): 5 TABLET ORAL at 18:08

## 2022-10-24 RX ADMIN — GABAPENTIN 300 MILLIGRAM(S): 400 CAPSULE ORAL at 04:52

## 2022-10-24 RX ADMIN — Medication 81 MILLIGRAM(S): at 18:46

## 2022-10-24 RX ADMIN — FAMOTIDINE 20 MILLIGRAM(S): 10 INJECTION INTRAVENOUS at 20:43

## 2022-10-24 RX ADMIN — Medication 100 MILLIGRAM(S): at 16:00

## 2022-10-24 RX ADMIN — Medication 500 MILLILITER(S): at 13:45

## 2022-10-24 RX ADMIN — Medication 1500 MILLILITER(S): at 17:10

## 2022-10-24 RX ADMIN — SODIUM CHLORIDE 3000 MILLILITER(S): 9 INJECTION, SOLUTION INTRAVENOUS at 13:15

## 2022-10-24 RX ADMIN — CHLORHEXIDINE GLUCONATE 5 MILLILITER(S): 213 SOLUTION TOPICAL at 04:54

## 2022-10-24 RX ADMIN — HYDROMORPHONE HYDROCHLORIDE 0.5 MILLIGRAM(S): 2 INJECTION INTRAMUSCULAR; INTRAVENOUS; SUBCUTANEOUS at 16:10

## 2022-10-24 RX ADMIN — HYDROMORPHONE HYDROCHLORIDE 0.5 MILLIGRAM(S): 2 INJECTION INTRAMUSCULAR; INTRAVENOUS; SUBCUTANEOUS at 23:25

## 2022-10-24 RX ADMIN — Medication 650 MILLIGRAM(S): at 23:16

## 2022-10-24 RX ADMIN — Medication 100 MILLIEQUIVALENT(S): at 14:24

## 2022-10-24 RX ADMIN — Medication 100 MILLIEQUIVALENT(S): at 16:30

## 2022-10-24 RX ADMIN — GABAPENTIN 100 MILLIGRAM(S): 400 CAPSULE ORAL at 21:45

## 2022-10-24 RX ADMIN — HYDROMORPHONE HYDROCHLORIDE 0.5 MILLIGRAM(S): 2 INJECTION INTRAMUSCULAR; INTRAVENOUS; SUBCUTANEOUS at 23:10

## 2022-10-24 RX ADMIN — HEPARIN SODIUM 0 UNIT(S)/HR: 5000 INJECTION INTRAVENOUS; SUBCUTANEOUS at 06:36

## 2022-10-24 RX ADMIN — Medication 100 MILLIEQUIVALENT(S): at 17:10

## 2022-10-24 NOTE — PROGRESS NOTE ADULT - SUBJECTIVE AND OBJECTIVE BOX
Patient is a 52y old  Male who presents with a chief complaint of acute MI, NSTEMI (23 Oct 2022 14:26)      SUBJECTIVE / OVERNIGHT EVENTS: awaiting CABG today    Vital Signs Last 24 Hrs  T(F): 97.7 (10-24-22 @ 04:32), Max: 98.3 (10-23-22 @ 19:44)  HR: 62 (10-24-22 @ 04:32) (62 - 73)  BP: 123/81 (10-24-22 @ 04:32) (106/66 - 123/81)  RR: 18 (10-24-22 @ 04:32) (17 - 18)  SpO2: 99% (10-24-22 @ 04:32) (98% - 99%)  Telemetry:   CAPILLARY BLOOD GLUCOSE        I&O's Summary    23 Oct 2022 07:01  -  24 Oct 2022 07:00  --------------------------------------------------------  IN: 960 mL / OUT: 0 mL / NET: 960 mL        PHYSICAL EXAM:  GENERAL: NAD, well-developed  HEAD:  Atraumatic, Normocephalic  EYES: EOMI, PERRLA, conjunctiva and sclera clear  NECK: Supple, No JVD  CHEST/LUNG: Clear to auscultation bilaterally; No wheeze  HEART: Regular rate and rhythm; No murmurs, rubs, or gallops  ABDOMEN: Soft, Nontender, Nondistended; Bowel sounds present  EXTREMITIES:  2+ Peripheral Pulses, No clubbing, cyanosis, or edema  PSYCH: AAOx3  NEUROLOGY: non-focal  SKIN: No rashes or lesions    LABS:                        15.3   6.72  )-----------( 162      ( 24 Oct 2022 05:16 )             46.2     10-24    137  |  103  |  17  ----------------------------<  102<H>  4.5   |  23  |  0.98    Ca    9.1      24 Oct 2022 05:15      PTT - ( 24 Oct 2022 05:17 )  PTT:145.8 sec          RADIOLOGY & ADDITIONAL TESTS:    Imaging Personally Reviewed:    Consultant(s) Notes Reviewed:      Care Discussed with Consultants/Other Providers:

## 2022-10-24 NOTE — PROGRESS NOTE ADULT - ASSESSMENT
Patient is a 52 year old [place]og[place] male, current 0.5 ppd smoker, with no know significant past medical history who presented to the ED at [facility] after being transferred from [facility] with abnormal ECG findings. The patient presented to [facility] earlier today with a 1 year history of exertional chest pain, consistent with typical angina, worse in the last 24 hours. Troponin T at [facility] WNL, hsTroponin ~2 hours later on admission labs WNL. On arrival ECG demonstrated ST segmental elevations in anterolateral leads with Q waves suggestive of prior infarct with ongoing symptoms. ECG did not meet criteria for STEMI. He was given brilenta 180 mg PO, loaded with IV heparin and started on heparin ggt. Patient was consented for cardiac catheterization. Patient to be admitted for management of NSTEMI.  NSTEMI  Triple vessel CAD: LHC showing prox LAD , mid Cx 50%, prox RCA 40%, RPL    - on the schedule for CABG on 10/24  - s/p  + Brilinta 360 mg total on admission; continue ASA 81 qd,   - cardiac viability study done, some viable , inferior and apical not viable  - continue Atorvastatin 40 qHS  - BP is soft, HR below 60, unable to start BB  - TTE: EF 45%, on heparin drip for apical thrombus on TTE

## 2022-10-24 NOTE — PROGRESS NOTE ADULT - SUBJECTIVE AND OBJECTIVE BOX
CRITICAL CARE ATTENDING - CTICU    MEDICATIONS  (STANDING):  acetaminophen     Tablet .. 650 milliGRAM(s) Oral every 6 hours  aMIOdarone    Tablet 400 milliGRAM(s) Oral two times a day  ascorbic acid 500 milliGRAM(s) Oral two times a day  aspirin enteric coated 81 milliGRAM(s) Oral daily  aspirin Suppository 300 milliGRAM(s) Rectal once  cefuroxime  IVPB 1500 milliGRAM(s) IV Intermittent every 8 hours  chlorhexidine 0.12% Liquid 15 milliLiter(s) Oral Mucosa every 12 hours  chlorhexidine 2% Cloths 1 Application(s) Topical daily  dexMEDEtomidine Infusion 1 MICROgram(s)/kG/Hr (17.2 mL/Hr) IV Continuous <Continuous>  dextrose 50% Injectable 50 milliLiter(s) IV Push every 15 minutes  dextrose 50% Injectable 25 milliLiter(s) IV Push every 15 minutes  famotidine Injectable 20 milliGRAM(s) IV Push every 24 hours  gabapentin 100 milliGRAM(s) Oral every 8 hours  insulin regular Infusion 3 Unit(s)/Hr (3 mL/Hr) IV Continuous <Continuous>  meperidine     Injectable 25 milliGRAM(s) IV Push once  niCARdipine Infusion 5 mG/Hr (25 mL/Hr) IV Continuous <Continuous>  potassium chloride  10 mEq/50 mL IVPB 10 milliEquivalent(s) IV Intermittent every 1 hour  potassium chloride  10 mEq/50 mL IVPB 10 milliEquivalent(s) IV Intermittent every 1 hour  potassium chloride  10 mEq/50 mL IVPB 10 milliEquivalent(s) IV Intermittent every 1 hour  sodium chloride 0.9%. 1000 milliLiter(s) (10 mL/Hr) IV Continuous <Continuous>                                    15.3   6.72  )-----------( 162      ( 24 Oct 2022 05:16 )             46.2       10-24    137  |  103  |  17  ----------------------------<  102<H>  4.5   |  23  |  0.98    Ca    9.1      24 Oct 2022 05:15        PTT - ( 24 Oct 2022 05:17 )  PTT:145.8 sec    Mode: AC/ CMV (Assist Control/ Continuous Mandatory Ventilation)  RR (machine): 12  TV (machine): 500  FiO2: 100  PEEP: 5  ITime: 1  MAP: 8  PIP: 19      Daily Height in cm: 162.56 (23 Oct 2022 19:02)    Daily       10-23 @ 07:01  -  10-24 @ 07:00  --------------------------------------------------------  IN: 960 mL / OUT: 0 mL / NET: 960 mL        Critically Ill patient  : [ ] preoperative ,   [ x] post operative    Requires :  [x ] Arterial Line   [ x] Central Line  [ ] PA catheter  [ ] IABP  [ ] ECMO  [ ] LVAD  [ x] Ventilator  [x ] pacemaker - TPM  [ ] Impella.                      [x ABG's     [x ] Pulse Oxymetry Monitoring  Bedside evaluation , monitoring , treatment of hemodynamics , fluids , IVP/ IVCD meds.        Diagnosis:     Op Day - CABG X 3 L /  R    Hypovolemia     Hypotension a/w Hypertension, requiring intravenous medication.     Hemodynamic lability,  instability. Requires IVCD [ ] vasopressors [ ] inotropes  [x ] vasodilator  [x ]IVSS fluid  to maintain MAP, perfusion, C.I.     Temporary pacemaker (TPM) interrogation and setting.     Chest Tube Drainage / Management     Ventilator Management:  [ x]AC-rest post op    [ x]CPAP-PS Wean  this PM    [ ]Trach Collar     [ ]Extubate    [ ] T-Piece  [ ]peep>5     Requires chest PT, pulmonary toilet, ambu bagging, suctioning to maintain SaO2,  patent airway and treat atelectasis.     IVCD insulin     IVCD Sedation / Analgesia to maintain ventilator synchrony and weaning     Rapid weaning candidate                     Discussed with CT surgeon, Physician's Assistant - Nurse Practitioner- Critical care medicine team.   Discussed at  AM / PM rounds.   Chart, labs , films reviewed.    Cumulative Critical Care Time Given Today : 30 min

## 2022-10-24 NOTE — PROGRESS NOTE ADULT - SUBJECTIVE AND OBJECTIVE BOX
Patient seen and examined at the bedside.    Remained critically ill on continuous ICU monitoring.    OBJECTIVE:  Vital Signs Last 24 Hrs  T(C): 36.5 (24 Oct 2022 16:00), Max: 36.8 (23 Oct 2022 19:44)  T(F): 97.7 (24 Oct 2022 16:00), Max: 98.3 (23 Oct 2022 19:44)  HR: 75 (24 Oct 2022 19:00) (62 - 81)  BP: 123/81 (24 Oct 2022 04:32) (106/66 - 123/81)  BP(mean): --  RR: 14 (24 Oct 2022 19:00) (10 - 28)  SpO2: 99% (24 Oct 2022 19:00) (98% - 100%)    Parameters below as of 24 Oct 2022 17:00  Patient On (Oxygen Delivery Method): nasal cannula  O2 Flow (L/min): 3  O2 Concentration (%): 32    REVIEW OF SYSTEMS:      Physical Exam:  General: multiple lines gtt & tubes   Neurology: Nonfocal   Eyes: bilaeral pupils equal and reactive   ENT/Neck: +ETT midline, Neck supple, trachea midline, No JVD   Respiratory: Rales noted bilaterally   CV: RRR, S1S2, no murmurs        [x] Sternal dressing, [x] Mediastinal CT, [x] L Pleural CT, [x] Bulb        [x] Sinus rhythm, [x] Temporary pacing- VVI @40   Abdominal: Soft, NT, ND +BS,   Extremities: 1-2+ pedal edema noted, + peripheral pulses   Skin: No Rashes, Hematoma, Ecchymosis                           Assessment:    Plan:   ***Neuro***  [x] Nonfocal   Post operative neuro assessment     ***Cardiovascular***  Invasive hemodynamic monitoring, assess perfusion indices   SR / CVP -4/ MAP 92/ Hct 35.0%/ Lactate 2.1  [x] Cardene 5mg/hr   Amiodarone for afib prophylaxis   Reassessment of hemodynamics post resuscitation   Monitor chest tube outputs   [] Bleeding ___ / [] Nonbleeding ___   [x] ASA [x] Statin   Serial EKG and cardiac enzymes     ***Pulmonary***  [x] NC 3L   Encourage incentive spirometry, continue pulse ox monitoring, follow ABGs     ***GI***  [x] Tolerating a clear liquid diet   [x] Pepcid    ***Renal***    Continue to monitor I/Os, BUN/Creatinine.   Replete lytes PRN  Benjamin present    ***ID***  Perioperative coverage with Cefuroxime.    ***Endocrine***  [x] Hyperglycemia  : HbA1c 5.7%              - [x] Insulin gtt              - Need tight glycemic control to prevent wound infection.          Patient requires continuous monitoring with bedside rhythm monitoring, pulse oximetry monitoring, and continuous central venous and arterial pressure monitoring; and intermittent blood gas analysis. Care plan discussed with the ICU care team.   Patient remained critical, at risk for life threatening decompensation.    I have spent 45 minutes providing critical care management to this patient.    By signing my name below, I, Radha Zheng, attest that this documentation has been prepared under the direction and in the presence of Mariana Davis MD   Electronically signed: Yu Da Silva, 10-24-22 @ 19:28    I, Mariana Davis, personally performed the services described in this documentation. all medical record entries made by the scribe were at my direction and in my presence. I have reviewed the chart and agree that the record reflects my personal performance and is accurate and complete  Electronically signed: Mariana Davis MD  Patient seen and examined at the bedside.    Remained critically ill on continuous ICU monitoring.    OBJECTIVE:  Vital Signs Last 24 Hrs  T(C): 36.5 (24 Oct 2022 16:00), Max: 36.8 (23 Oct 2022 19:44)  T(F): 97.7 (24 Oct 2022 16:00), Max: 98.3 (23 Oct 2022 19:44)  HR: 75 (24 Oct 2022 19:00) (62 - 81)  BP: 123/81 (24 Oct 2022 04:32) (106/66 - 123/81)  BP(mean): --  RR: 14 (24 Oct 2022 19:00) (10 - 28)  SpO2: 99% (24 Oct 2022 19:00) (98% - 100%)    Parameters below as of 24 Oct 2022 17:00  Patient On (Oxygen Delivery Method): nasal cannula  O2 Flow (L/min): 3  O2 Concentration (%): 32    REVIEW OF SYSTEMS:  + incisional chest pain      Physical Exam:  General: multiple lines gtt & tubes   Neurology: Nonfocal   Eyes: bilateral pupils equal and reactive   ENT/Neck: Neck supple, trachea midline, No JVD   Respiratory: Rales noted bilaterally   CV: RRR, S1S2, no murmurs        [x] Sternal dressing, [x] Mediastinal CT, [x] L Pleural CT, [x] Bulb        [x] Sinus rhythm, [x] Temporary pacing- VVI @40   Abdominal: Soft, NT, ND +BS,   Extremities: 1-2+ pedal edema noted, + peripheral pulses   Skin: No Rashes, Hematoma, Ecchymosis                           Assessment:  52 yr male Active long standing smoking history / HTN / HLD   NSTEMI / S/P Cardiac Cath severe multivessel CAD / moderate LV Systolic failure / LV Apical mural thrombus   S/P CABG X 3/ L radial D # 0  Intra operative JAYNA large mobile atheroma in descending aorta   Post op labile BP hypotension related to hypovolemia w lactic acidosis     Plan:   ***Neuro***  [x] Nonfocal   Post operative neuro assessment     ***Cardiovascular***  Post op labile BP requiring volume loading with increase lactic acid level   Invasive hemodynamic monitoring, assess perfusion indices   SR 80  / CVP 4-6 / MAP 65-90 / Hct 35.0%/ Lactate 3.1 >>>2.1 (post volume)   [x] Albumin 1000 cc [x] LR 1000 cc   [x] Cardene 5mg/hr   [x] Amiodarone A-Fib Px   LV thrombus will require AC ?  Monitor chest tube outputs   [x] ASA [x] High intensity Statin   Once lactate normalize will initiate Beta blockers    Serial EKG and cardiac enzymes     ***Pulmonary***  H/O Smoking at risk for post op pulmonary complication  [x] NC 3L   Encourage incentive spirometry, continue pulse ox monitoring, follow ABGs   Lung expansion maneuvers / PRN Bronchodilators for wheezing     ***GI***  [x] Tolerating a clear liquid diet   [x] Pepcid    ***Renal***    Continue to monitor I/Os, BUN/Creatinine.   Replete lytes PRN  Benjamin present    ***ID***  Perioperative coverage with Cefuroxime.    ***Endocrine***  [x] Hyperglycemia  : HbA1c 5.7%              - [x] Insulin gtt              - Need tight glycemic control to prevent wound infection.          Patient requires continuous monitoring with bedside rhythm monitoring, pulse oximetry monitoring, and continuous central venous and arterial pressure monitoring; and intermittent blood gas analysis. Care plan discussed with the ICU care team.   Patient remained critical, at risk for life threatening decompensation.    I have spent 45 minutes providing critical care management to this patient.    By signing my name below, I, Radha Zheng, attest that this documentation has been prepared under the direction and in the presence of Mariana Davis MD   Electronically signed: Yu Da Silva, 10-24-22 @ 19:28    I, Mariana Davis, personally performed the services described in this documentation. all medical record entries made by the scribe were at my direction and in my presence. I have reviewed the chart and agree that the record reflects my personal performance and is accurate and complete  Electronically signed: Mariana Davis MD  Patient seen and examined at the bedside.    Remained critically ill on continuous ICU monitoring.    OBJECTIVE:  Vital Signs Last 24 Hrs  T(C): 36.5 (24 Oct 2022 16:00), Max: 36.8 (23 Oct 2022 19:44)  T(F): 97.7 (24 Oct 2022 16:00), Max: 98.3 (23 Oct 2022 19:44)  HR: 75 (24 Oct 2022 19:00) (62 - 81)  BP: 123/81 (24 Oct 2022 04:32) (106/66 - 123/81)  BP(mean): --  RR: 14 (24 Oct 2022 19:00) (10 - 28)  SpO2: 99% (24 Oct 2022 19:00) (98% - 100%)    Parameters below as of 24 Oct 2022 17:00  Patient On (Oxygen Delivery Method): nasal cannula  O2 Flow (L/min): 3  O2 Concentration (%): 32    REVIEW OF SYSTEMS:  + incisional chest pain      Physical Exam:  General: multiple lines gtt & tubes   Neurology: Nonfocal   Eyes: bilateral pupils equal and reactive   ENT/Neck: Neck supple, trachea midline, No JVD   Respiratory: Rales noted bilaterally   CV: RRR, S1S2, no murmurs        [x] Sternal dressing, [x] Mediastinal CT, [x] L Pleural CT, [x] Bulb        [x] Sinus rhythm, [x] Temporary pacing- VVI @40   Abdominal: Soft, NT, ND +BS,   Extremities: 1-2+ pedal edema noted, + peripheral pulses   Skin: No Rashes, Hematoma, Ecchymosis                           Assessment:  52 yr male Active long standing smoking history / HTN / HLD   NSTEMI / S/P Cardiac Cath severe multivessel CAD / moderate LV Systolic failure / LV Apical mural thrombus   S/P CABG X 3/ L radial D # 0  Post op labile BP hypotension related to hypovolemia w lactic acidosis     Plan:   ***Neuro***  [x] Nonfocal   Post operative neuro assessment     ***Cardiovascular***  Post op labile BP requiring volume loading with increase lactic acid level   Invasive hemodynamic monitoring, assess perfusion indices   SR 80  / CVP 4-6 / MAP 65-90 / Hct 35.0%/ Lactate 3.1 >>>2.1 (post volume)   [x] Albumin 1000 cc [x] LR 1000 cc   [x] Cardene 5mg/hr   [x] Amiodarone A-Fib Px   LV thrombus will require AC ?  Monitor chest tube outputs   [x] ASA [x] High intensity Statin   Once lactate normalize will initiate Beta blockers    Serial EKG and cardiac enzymes     ***Pulmonary***  H/O Smoking at risk for post op pulmonary complication  [x] NC 3L   Encourage incentive spirometry, continue pulse ox monitoring, follow ABGs   Lung expansion maneuvers / PRN Bronchodilators for wheezing     ***GI***  [x] Tolerating a clear liquid diet   [x] Pepcid    ***Renal***    Continue to monitor I/Os, BUN/Creatinine.   Replete lytes PRN  Benjamin present    ***ID***  Perioperative coverage with Cefuroxime.    ***Endocrine***  [x] Hyperglycemia  : HbA1c 5.7%              - [x] Insulin gtt              - Need tight glycemic control to prevent wound infection.          Patient requires continuous monitoring with bedside rhythm monitoring, pulse oximetry monitoring, and continuous central venous and arterial pressure monitoring; and intermittent blood gas analysis. Care plan discussed with the ICU care team.   Patient remained critical, at risk for life threatening decompensation.    I have spent 45 minutes providing critical care management to this patient.    By signing my name below, I, Radha Zheng, attest that this documentation has been prepared under the direction and in the presence of Mariana Davis MD   Electronically signed: Yu Da Silva, 10-24-22 @ 19:28    I, Mariana Davis, personally performed the services described in this documentation. all medical record entries made by the scribe were at my direction and in my presence. I have reviewed the chart and agree that the record reflects my personal performance and is accurate and complete  Electronically signed: Mariana Davis MD

## 2022-10-24 NOTE — AIRWAY REMOVAL NOTE  ADULT & PEDS - ARTIFICAL AIRWAY REMOVAL COMMENTS
Written order for extubation verified. The patient was identified by full name and birth date compared to the identification band. Present during the procedure was STEF CLEMENT

## 2022-10-24 NOTE — BRIEF OPERATIVE NOTE - NSICDXBRIEFPROCEDURE_GEN_ALL_CORE_FT
PROCEDURES:  CABG, with JAYNA 24-Oct-2022 15:39:08 CABGx3 (LIMA-LAD, SVG- RCA, Radial- PDA) Julee Gallegos D

## 2022-10-24 NOTE — BRIEF OPERATIVE NOTE - COMMENTS
I first assisted for the entirety of the case, including sternotomy, cardiopulmonary bypass, placement of coronary grafts and closing.    Radial and Ulnar Artery Assessed pre-op and intra operative.  The ulnar and radial artery assessed via the modified neeru test, SPO2 and ultrasound assessment which showed good Ulnar and Radial arterial flow.

## 2022-10-24 NOTE — BRIEF OPERATIVE NOTE - ADULT CT SURG CONDUIT HARVEST PERFORMED BY
Left Radial Artery [facility] -Rosy FABIAN/ Roni PA-Right Greater saphenous vein endoscopic

## 2022-10-25 LAB
ALBUMIN SERPL ELPH-MCNC: 4.5 G/DL — SIGNIFICANT CHANGE UP (ref 3.3–5)
ALP SERPL-CCNC: 31 U/L — LOW (ref 40–120)
ALT FLD-CCNC: 78 U/L — HIGH (ref 10–45)
ANION GAP SERPL CALC-SCNC: 15 MMOL/L — SIGNIFICANT CHANGE UP (ref 5–17)
APTT BLD: 34 SEC — SIGNIFICANT CHANGE UP (ref 27.5–35.5)
AST SERPL-CCNC: 64 U/L — HIGH (ref 10–40)
BASE EXCESS BLDV CALC-SCNC: -1.6 MMOL/L — SIGNIFICANT CHANGE UP (ref -2–3)
BASE EXCESS BLDV CALC-SCNC: -2.7 MMOL/L — LOW (ref -2–3)
BASE EXCESS BLDV CALC-SCNC: -3.9 MMOL/L — LOW (ref -2–3)
BASE EXCESS BLDV CALC-SCNC: -3.9 MMOL/L — LOW (ref -2–3)
BASE EXCESS BLDV CALC-SCNC: -4.3 MMOL/L — LOW (ref -2–3)
BASE EXCESS BLDV CALC-SCNC: -4.4 MMOL/L — LOW (ref -2–3)
BASE EXCESS BLDV CALC-SCNC: -4.6 MMOL/L — LOW (ref -2–3)
BASOPHILS # BLD AUTO: 0.01 K/UL — SIGNIFICANT CHANGE UP (ref 0–0.2)
BASOPHILS NFR BLD AUTO: 0.1 % — SIGNIFICANT CHANGE UP (ref 0–2)
BILIRUB SERPL-MCNC: 0.6 MG/DL — SIGNIFICANT CHANGE UP (ref 0.2–1.2)
BUN SERPL-MCNC: 14 MG/DL — SIGNIFICANT CHANGE UP (ref 7–23)
CA-I SERPL-SCNC: 1.12 MMOL/L — LOW (ref 1.15–1.33)
CA-I SERPL-SCNC: 1.14 MMOL/L — LOW (ref 1.15–1.33)
CA-I SERPL-SCNC: 1.17 MMOL/L — SIGNIFICANT CHANGE UP (ref 1.15–1.33)
CA-I SERPL-SCNC: 1.18 MMOL/L — SIGNIFICANT CHANGE UP (ref 1.15–1.33)
CA-I SERPL-SCNC: 1.23 MMOL/L — SIGNIFICANT CHANGE UP (ref 1.15–1.33)
CALCIUM SERPL-MCNC: 9.2 MG/DL — SIGNIFICANT CHANGE UP (ref 8.4–10.5)
CHLORIDE BLDV-SCNC: 102 MMOL/L — SIGNIFICANT CHANGE UP (ref 96–108)
CHLORIDE BLDV-SCNC: 103 MMOL/L — SIGNIFICANT CHANGE UP (ref 96–108)
CHLORIDE BLDV-SCNC: 104 MMOL/L — SIGNIFICANT CHANGE UP (ref 96–108)
CHLORIDE SERPL-SCNC: 102 MMOL/L — SIGNIFICANT CHANGE UP (ref 96–108)
CO2 BLDV-SCNC: 21 MMOL/L — LOW (ref 22–26)
CO2 BLDV-SCNC: 21 MMOL/L — LOW (ref 22–26)
CO2 BLDV-SCNC: 22 MMOL/L — SIGNIFICANT CHANGE UP (ref 22–26)
CO2 BLDV-SCNC: 22 MMOL/L — SIGNIFICANT CHANGE UP (ref 22–26)
CO2 BLDV-SCNC: 23 MMOL/L — SIGNIFICANT CHANGE UP (ref 22–26)
CO2 BLDV-SCNC: 24 MMOL/L — SIGNIFICANT CHANGE UP (ref 22–26)
CO2 BLDV-SCNC: 24 MMOL/L — SIGNIFICANT CHANGE UP (ref 22–26)
CO2 SERPL-SCNC: 19 MMOL/L — LOW (ref 22–31)
CREAT SERPL-MCNC: 0.78 MG/DL — SIGNIFICANT CHANGE UP (ref 0.5–1.3)
EGFR: 107 ML/MIN/1.73M2 — SIGNIFICANT CHANGE UP
EOSINOPHIL # BLD AUTO: 0 K/UL — SIGNIFICANT CHANGE UP (ref 0–0.5)
EOSINOPHIL NFR BLD AUTO: 0 % — SIGNIFICANT CHANGE UP (ref 0–6)
FIBRINOGEN PPP-MCNC: 315 MG/DL — LOW (ref 330–520)
GAS PNL BLDA: SIGNIFICANT CHANGE UP
GAS PNL BLDV: 132 MMOL/L — LOW (ref 136–145)
GAS PNL BLDV: 132 MMOL/L — LOW (ref 136–145)
GAS PNL BLDV: 133 MMOL/L — LOW (ref 136–145)
GAS PNL BLDV: 133 MMOL/L — LOW (ref 136–145)
GAS PNL BLDV: 134 MMOL/L — LOW (ref 136–145)
GAS PNL BLDV: SIGNIFICANT CHANGE UP
GLUCOSE BLDC GLUCOMTR-MCNC: 118 MG/DL — HIGH (ref 70–99)
GLUCOSE BLDC GLUCOMTR-MCNC: 120 MG/DL — HIGH (ref 70–99)
GLUCOSE BLDC GLUCOMTR-MCNC: 128 MG/DL — HIGH (ref 70–99)
GLUCOSE BLDC GLUCOMTR-MCNC: 129 MG/DL — HIGH (ref 70–99)
GLUCOSE BLDC GLUCOMTR-MCNC: 130 MG/DL — HIGH (ref 70–99)
GLUCOSE BLDC GLUCOMTR-MCNC: 131 MG/DL — HIGH (ref 70–99)
GLUCOSE BLDC GLUCOMTR-MCNC: 134 MG/DL — HIGH (ref 70–99)
GLUCOSE BLDC GLUCOMTR-MCNC: 137 MG/DL — HIGH (ref 70–99)
GLUCOSE BLDC GLUCOMTR-MCNC: 137 MG/DL — HIGH (ref 70–99)
GLUCOSE BLDC GLUCOMTR-MCNC: 139 MG/DL — HIGH (ref 70–99)
GLUCOSE BLDC GLUCOMTR-MCNC: 151 MG/DL — HIGH (ref 70–99)
GLUCOSE BLDC GLUCOMTR-MCNC: 152 MG/DL — HIGH (ref 70–99)
GLUCOSE BLDC GLUCOMTR-MCNC: 159 MG/DL — HIGH (ref 70–99)
GLUCOSE BLDC GLUCOMTR-MCNC: 161 MG/DL — HIGH (ref 70–99)
GLUCOSE BLDC GLUCOMTR-MCNC: 164 MG/DL — HIGH (ref 70–99)
GLUCOSE BLDC GLUCOMTR-MCNC: 178 MG/DL — HIGH (ref 70–99)
GLUCOSE BLDC GLUCOMTR-MCNC: 183 MG/DL — HIGH (ref 70–99)
GLUCOSE BLDC GLUCOMTR-MCNC: 189 MG/DL — HIGH (ref 70–99)
GLUCOSE BLDC GLUCOMTR-MCNC: 228 MG/DL — HIGH (ref 70–99)
GLUCOSE BLDC GLUCOMTR-MCNC: 97 MG/DL — SIGNIFICANT CHANGE UP (ref 70–99)
GLUCOSE BLDV-MCNC: 118 MG/DL — HIGH (ref 70–99)
GLUCOSE BLDV-MCNC: 156 MG/DL — HIGH (ref 70–99)
GLUCOSE BLDV-MCNC: 173 MG/DL — HIGH (ref 70–99)
GLUCOSE BLDV-MCNC: 176 MG/DL — HIGH (ref 70–99)
GLUCOSE BLDV-MCNC: 219 MG/DL — HIGH (ref 70–99)
GLUCOSE SERPL-MCNC: 139 MG/DL — HIGH (ref 70–99)
HCO3 BLDV-SCNC: 20 MMOL/L — LOW (ref 22–29)
HCO3 BLDV-SCNC: 20 MMOL/L — LOW (ref 22–29)
HCO3 BLDV-SCNC: 21 MMOL/L — LOW (ref 22–29)
HCO3 BLDV-SCNC: 21 MMOL/L — LOW (ref 22–29)
HCO3 BLDV-SCNC: 22 MMOL/L — SIGNIFICANT CHANGE UP (ref 22–29)
HCO3 BLDV-SCNC: 22 MMOL/L — SIGNIFICANT CHANGE UP (ref 22–29)
HCO3 BLDV-SCNC: 23 MMOL/L — SIGNIFICANT CHANGE UP (ref 22–29)
HCT VFR BLD CALC: 34.5 % — LOW (ref 39–50)
HCT VFR BLDA CALC: 33 % — LOW (ref 39–51)
HCT VFR BLDA CALC: 34 % — LOW (ref 39–51)
HCT VFR BLDA CALC: 35 % — LOW (ref 39–51)
HGB BLD CALC-MCNC: 11 G/DL — LOW (ref 12.6–17.4)
HGB BLD CALC-MCNC: 11.2 G/DL — LOW (ref 12.6–17.4)
HGB BLD CALC-MCNC: 11.2 G/DL — LOW (ref 12.6–17.4)
HGB BLD CALC-MCNC: 11.3 G/DL — LOW (ref 12.6–17.4)
HGB BLD CALC-MCNC: 11.5 G/DL — LOW (ref 12.6–17.4)
HGB BLD-MCNC: 11.6 G/DL — LOW (ref 13–17)
HOROWITZ INDEX BLDV+IHG-RTO: 36 — SIGNIFICANT CHANGE UP
HOROWITZ INDEX BLDV+IHG-RTO: 36 — SIGNIFICANT CHANGE UP
HOROWITZ INDEX BLDV+IHG-RTO: 40 — SIGNIFICANT CHANGE UP
HOROWITZ INDEX BLDV+IHG-RTO: 50 — SIGNIFICANT CHANGE UP
IMM GRANULOCYTES NFR BLD AUTO: 0.9 % — SIGNIFICANT CHANGE UP (ref 0–0.9)
INR BLD: 1.19 RATIO — HIGH (ref 0.88–1.16)
LACTATE BLDV-MCNC: 2.6 MMOL/L — HIGH (ref 0.5–2)
LACTATE BLDV-MCNC: 3.2 MMOL/L — HIGH (ref 0.5–2)
LACTATE BLDV-MCNC: 3.4 MMOL/L — HIGH (ref 0.5–2)
LACTATE BLDV-MCNC: 3.4 MMOL/L — HIGH (ref 0.5–2)
LACTATE BLDV-MCNC: 4.5 MMOL/L — CRITICAL HIGH (ref 0.5–2)
LYMPHOCYTES # BLD AUTO: 0.68 K/UL — LOW (ref 1–3.3)
LYMPHOCYTES # BLD AUTO: 5.1 % — LOW (ref 13–44)
MAGNESIUM SERPL-MCNC: 1.9 MG/DL — SIGNIFICANT CHANGE UP (ref 1.6–2.6)
MCHC RBC-ENTMCNC: 32.2 PG — SIGNIFICANT CHANGE UP (ref 27–34)
MCHC RBC-ENTMCNC: 33.6 GM/DL — SIGNIFICANT CHANGE UP (ref 32–36)
MCV RBC AUTO: 95.8 FL — SIGNIFICANT CHANGE UP (ref 80–100)
MONOCYTES # BLD AUTO: 0.53 K/UL — SIGNIFICANT CHANGE UP (ref 0–0.9)
MONOCYTES NFR BLD AUTO: 3.9 % — SIGNIFICANT CHANGE UP (ref 2–14)
NEUTROPHILS # BLD AUTO: 12.11 K/UL — HIGH (ref 1.8–7.4)
NEUTROPHILS NFR BLD AUTO: 90 % — HIGH (ref 43–77)
NRBC # BLD: 0 /100 WBCS — SIGNIFICANT CHANGE UP (ref 0–0)
PCO2 BLDV: 33 MMHG — LOW (ref 42–55)
PCO2 BLDV: 35 MMHG — LOW (ref 42–55)
PCO2 BLDV: 36 MMHG — LOW (ref 42–55)
PCO2 BLDV: 36 MMHG — LOW (ref 42–55)
PCO2 BLDV: 37 MMHG — LOW (ref 42–55)
PCO2 BLDV: 39 MMHG — LOW (ref 42–55)
PCO2 BLDV: 43 MMHG — SIGNIFICANT CHANGE UP (ref 42–55)
PH BLDV: 7.32 — SIGNIFICANT CHANGE UP (ref 7.32–7.43)
PH BLDV: 7.34 — SIGNIFICANT CHANGE UP (ref 7.32–7.43)
PH BLDV: 7.36 — SIGNIFICANT CHANGE UP (ref 7.32–7.43)
PH BLDV: 7.38 — SIGNIFICANT CHANGE UP (ref 7.32–7.43)
PH BLDV: 7.39 — SIGNIFICANT CHANGE UP (ref 7.32–7.43)
PH BLDV: 7.39 — SIGNIFICANT CHANGE UP (ref 7.32–7.43)
PH BLDV: 7.4 — SIGNIFICANT CHANGE UP (ref 7.32–7.43)
PHOSPHATE SERPL-MCNC: 3 MG/DL — SIGNIFICANT CHANGE UP (ref 2.5–4.5)
PLATELET # BLD AUTO: 113 K/UL — LOW (ref 150–400)
PO2 BLDV: 34 MMHG — SIGNIFICANT CHANGE UP (ref 25–45)
PO2 BLDV: 38 MMHG — SIGNIFICANT CHANGE UP (ref 25–45)
PO2 BLDV: 41 MMHG — SIGNIFICANT CHANGE UP (ref 25–45)
PO2 BLDV: 44 MMHG — SIGNIFICANT CHANGE UP (ref 25–45)
PO2 BLDV: 45 MMHG — SIGNIFICANT CHANGE UP (ref 25–45)
PO2 BLDV: 47 MMHG — HIGH (ref 25–45)
PO2 BLDV: 47 MMHG — HIGH (ref 25–45)
POTASSIUM BLDV-SCNC: 3.7 MMOL/L — SIGNIFICANT CHANGE UP (ref 3.5–5.1)
POTASSIUM BLDV-SCNC: 3.8 MMOL/L — SIGNIFICANT CHANGE UP (ref 3.5–5.1)
POTASSIUM BLDV-SCNC: 4.1 MMOL/L — SIGNIFICANT CHANGE UP (ref 3.5–5.1)
POTASSIUM BLDV-SCNC: 4.2 MMOL/L — SIGNIFICANT CHANGE UP (ref 3.5–5.1)
POTASSIUM BLDV-SCNC: 4.6 MMOL/L — SIGNIFICANT CHANGE UP (ref 3.5–5.1)
POTASSIUM SERPL-MCNC: 4.3 MMOL/L — SIGNIFICANT CHANGE UP (ref 3.5–5.3)
POTASSIUM SERPL-SCNC: 4.3 MMOL/L — SIGNIFICANT CHANGE UP (ref 3.5–5.3)
PROT SERPL-MCNC: 6.6 G/DL — SIGNIFICANT CHANGE UP (ref 6–8.3)
PROTHROM AB SERPL-ACNC: 13.7 SEC — HIGH (ref 10.5–13.4)
RBC # BLD: 3.6 M/UL — LOW (ref 4.2–5.8)
RBC # FLD: 11.8 % — SIGNIFICANT CHANGE UP (ref 10.3–14.5)
SAO2 % BLDV: 58.8 % — LOW (ref 67–88)
SAO2 % BLDV: 66.1 % — LOW (ref 67–88)
SAO2 % BLDV: 66.5 % — LOW (ref 67–88)
SAO2 % BLDV: 72.6 % — SIGNIFICANT CHANGE UP (ref 67–88)
SAO2 % BLDV: 74.9 % — SIGNIFICANT CHANGE UP (ref 67–88)
SAO2 % BLDV: 76.2 % — SIGNIFICANT CHANGE UP (ref 67–88)
SAO2 % BLDV: 78.2 % — SIGNIFICANT CHANGE UP (ref 67–88)
SODIUM SERPL-SCNC: 136 MMOL/L — SIGNIFICANT CHANGE UP (ref 135–145)
WBC # BLD: 13.45 K/UL — HIGH (ref 3.8–10.5)
WBC # FLD AUTO: 13.45 K/UL — HIGH (ref 3.8–10.5)

## 2022-10-25 PROCEDURE — 99291 CRITICAL CARE FIRST HOUR: CPT | Mod: 24

## 2022-10-25 PROCEDURE — 71045 X-RAY EXAM CHEST 1 VIEW: CPT | Mod: 26

## 2022-10-25 PROCEDURE — 93306 TTE W/DOPPLER COMPLETE: CPT | Mod: 26

## 2022-10-25 PROCEDURE — 93010 ELECTROCARDIOGRAM REPORT: CPT

## 2022-10-25 PROCEDURE — 99292 CRITICAL CARE ADDL 30 MIN: CPT | Mod: 24

## 2022-10-25 RX ORDER — HYDROMORPHONE HYDROCHLORIDE 2 MG/ML
0.5 INJECTION INTRAMUSCULAR; INTRAVENOUS; SUBCUTANEOUS ONCE
Refills: 0 | Status: DISCONTINUED | OUTPATIENT
Start: 2022-10-25 | End: 2022-10-25

## 2022-10-25 RX ORDER — AMLODIPINE BESYLATE 2.5 MG/1
10 TABLET ORAL DAILY
Refills: 0 | Status: DISCONTINUED | OUTPATIENT
Start: 2022-10-25 | End: 2022-10-26

## 2022-10-25 RX ORDER — FUROSEMIDE 40 MG
10 TABLET ORAL ONCE
Refills: 0 | Status: COMPLETED | OUTPATIENT
Start: 2022-10-25 | End: 2022-10-25

## 2022-10-25 RX ORDER — MILRINONE LACTATE 1 MG/ML
0.1 INJECTION, SOLUTION INTRAVENOUS
Qty: 20 | Refills: 0 | Status: DISCONTINUED | OUTPATIENT
Start: 2022-10-25 | End: 2022-10-27

## 2022-10-25 RX ORDER — FAMOTIDINE 10 MG/ML
20 INJECTION INTRAVENOUS
Refills: 0 | Status: DISCONTINUED | OUTPATIENT
Start: 2022-10-25 | End: 2022-10-29

## 2022-10-25 RX ORDER — MAGNESIUM SULFATE 500 MG/ML
2 VIAL (ML) INJECTION ONCE
Refills: 0 | Status: COMPLETED | OUTPATIENT
Start: 2022-10-25 | End: 2022-10-25

## 2022-10-25 RX ORDER — ALBUMIN HUMAN 25 %
250 VIAL (ML) INTRAVENOUS ONCE
Refills: 0 | Status: COMPLETED | OUTPATIENT
Start: 2022-10-25 | End: 2022-10-25

## 2022-10-25 RX ORDER — HEPARIN SODIUM 5000 [USP'U]/ML
5000 INJECTION INTRAVENOUS; SUBCUTANEOUS EVERY 8 HOURS
Refills: 0 | Status: DISCONTINUED | OUTPATIENT
Start: 2022-10-25 | End: 2022-10-25

## 2022-10-25 RX ORDER — HYDRALAZINE HCL 50 MG
10 TABLET ORAL ONCE
Refills: 0 | Status: COMPLETED | OUTPATIENT
Start: 2022-10-25 | End: 2022-10-25

## 2022-10-25 RX ORDER — HEPARIN SODIUM 5000 [USP'U]/ML
800 INJECTION INTRAVENOUS; SUBCUTANEOUS
Qty: 25000 | Refills: 0 | Status: DISCONTINUED | OUTPATIENT
Start: 2022-10-25 | End: 2022-10-28

## 2022-10-25 RX ADMIN — GABAPENTIN 100 MILLIGRAM(S): 400 CAPSULE ORAL at 04:57

## 2022-10-25 RX ADMIN — Medication 650 MILLIGRAM(S): at 20:44

## 2022-10-25 RX ADMIN — AMIODARONE HYDROCHLORIDE 400 MILLIGRAM(S): 400 TABLET ORAL at 05:14

## 2022-10-25 RX ADMIN — CHLORHEXIDINE GLUCONATE 1 APPLICATION(S): 213 SOLUTION TOPICAL at 12:39

## 2022-10-25 RX ADMIN — POLYETHYLENE GLYCOL 3350 17 GRAM(S): 17 POWDER, FOR SOLUTION ORAL at 12:39

## 2022-10-25 RX ADMIN — HYDROMORPHONE HYDROCHLORIDE 0.5 MILLIGRAM(S): 2 INJECTION INTRAMUSCULAR; INTRAVENOUS; SUBCUTANEOUS at 23:05

## 2022-10-25 RX ADMIN — Medication 81 MILLIGRAM(S): at 12:39

## 2022-10-25 RX ADMIN — HEPARIN SODIUM 5000 UNIT(S): 5000 INJECTION INTRAVENOUS; SUBCUTANEOUS at 14:00

## 2022-10-25 RX ADMIN — ATORVASTATIN CALCIUM 80 MILLIGRAM(S): 80 TABLET, FILM COATED ORAL at 21:02

## 2022-10-25 RX ADMIN — HYDROMORPHONE HYDROCHLORIDE 0.5 MILLIGRAM(S): 2 INJECTION INTRAMUSCULAR; INTRAVENOUS; SUBCUTANEOUS at 03:05

## 2022-10-25 RX ADMIN — GABAPENTIN 100 MILLIGRAM(S): 400 CAPSULE ORAL at 14:00

## 2022-10-25 RX ADMIN — Medication 10 MILLIGRAM(S): at 01:12

## 2022-10-25 RX ADMIN — FAMOTIDINE 20 MILLIGRAM(S): 10 INJECTION INTRAVENOUS at 21:02

## 2022-10-25 RX ADMIN — Medication 10 MILLIGRAM(S): at 05:43

## 2022-10-25 RX ADMIN — Medication 100 MILLIGRAM(S): at 09:00

## 2022-10-25 RX ADMIN — Medication 100 MILLIGRAM(S): at 00:49

## 2022-10-25 RX ADMIN — GABAPENTIN 100 MILLIGRAM(S): 400 CAPSULE ORAL at 21:02

## 2022-10-25 RX ADMIN — Medication 25 GRAM(S): at 02:34

## 2022-10-25 RX ADMIN — Medication 650 MILLIGRAM(S): at 13:47

## 2022-10-25 RX ADMIN — Medication 650 MILLIGRAM(S): at 05:14

## 2022-10-25 RX ADMIN — Medication 650 MILLIGRAM(S): at 00:17

## 2022-10-25 RX ADMIN — Medication 10 MILLIGRAM(S): at 04:17

## 2022-10-25 RX ADMIN — HYDROMORPHONE HYDROCHLORIDE 0.5 MILLIGRAM(S): 2 INJECTION INTRAMUSCULAR; INTRAVENOUS; SUBCUTANEOUS at 23:20

## 2022-10-25 RX ADMIN — INSULIN HUMAN 3 UNIT(S)/HR: 100 INJECTION, SOLUTION SUBCUTANEOUS at 21:02

## 2022-10-25 RX ADMIN — MILRINONE LACTATE 7.73 MICROGRAM(S)/KG/MIN: 1 INJECTION, SOLUTION INTRAVENOUS at 21:02

## 2022-10-25 RX ADMIN — SENNA PLUS 2 TABLET(S): 8.6 TABLET ORAL at 22:43

## 2022-10-25 RX ADMIN — AMIODARONE HYDROCHLORIDE 400 MILLIGRAM(S): 400 TABLET ORAL at 18:13

## 2022-10-25 RX ADMIN — Medication 650 MILLIGRAM(S): at 13:30

## 2022-10-25 RX ADMIN — Medication 125 MILLILITER(S): at 00:20

## 2022-10-25 RX ADMIN — NICARDIPINE HYDROCHLORIDE 25 MG/HR: 30 CAPSULE, EXTENDED RELEASE ORAL at 21:03

## 2022-10-25 RX ADMIN — Medication 650 MILLIGRAM(S): at 23:29

## 2022-10-25 RX ADMIN — Medication 100 MILLIGRAM(S): at 23:30

## 2022-10-25 RX ADMIN — Medication 650 MILLIGRAM(S): at 18:13

## 2022-10-25 RX ADMIN — HYDROMORPHONE HYDROCHLORIDE 0.5 MILLIGRAM(S): 2 INJECTION INTRAMUSCULAR; INTRAVENOUS; SUBCUTANEOUS at 14:30

## 2022-10-25 RX ADMIN — AMLODIPINE BESYLATE 10 MILLIGRAM(S): 2.5 TABLET ORAL at 05:00

## 2022-10-25 RX ADMIN — HYDROMORPHONE HYDROCHLORIDE 0.5 MILLIGRAM(S): 2 INJECTION INTRAMUSCULAR; INTRAVENOUS; SUBCUTANEOUS at 15:00

## 2022-10-25 RX ADMIN — HYDROMORPHONE HYDROCHLORIDE 0.5 MILLIGRAM(S): 2 INJECTION INTRAMUSCULAR; INTRAVENOUS; SUBCUTANEOUS at 03:15

## 2022-10-25 RX ADMIN — Medication 500 MILLIGRAM(S): at 05:00

## 2022-10-25 RX ADMIN — Medication 500 MILLIGRAM(S): at 18:13

## 2022-10-25 RX ADMIN — Medication 100 MILLIGRAM(S): at 16:00

## 2022-10-25 NOTE — PHYSICAL THERAPY INITIAL EVALUATION ADULT - ADDITIONAL COMMENTS
Pt lives in a pvt single level house w/ family. Pt has 3 steps to enter. PTA indep w/o an assist device for all ADLs

## 2022-10-25 NOTE — OCCUPATIONAL THERAPY INITIAL EVALUATION ADULT - PERTINENT HX OF CURRENT PROBLEM, REHAB EVAL
51yo M daily smoker 1ppd, [place] presents today with chest pain from [place] H w abn EKG. he has Radiation to the L arm. Woke up with pain. Vomited x3. NBNB. States he has had intermittent chest pain ongoing for the past year. Has never seen a cardiologist or had EST/echo performed. EKG in the D w Anterolateral ST -T abn. going for [facility] as per cardiology team.  MR cardiac 10/19-1. Normal left ventricular size. Mildly depressed left ventricular function. LVEF: 45%. Findings compatible with ischemic cardiomyopathy with LAD and RPL territory infarct. Late gadolinium enhancement pattern suggests viable myocardium in the basal to mid septum, although nonviable myocardium at the apex, which is akinetic and thinned with a 1.7 cm apical thrombus. Unlikely viability in the basal inferolateral wall. 2. Normal right ventricular size and function. RVEF: 68%. 3. No significant valvular abnormalities. 4. Normal atrial size.

## 2022-10-25 NOTE — PHYSICAL THERAPY INITIAL EVALUATION ADULT - PLANNED THERAPY INTERVENTIONS, PT EVAL
1. LTG Pt will be indep to neg 3 step using HR w/in 2 wks/bed mobility training/gait training/transfer training

## 2022-10-25 NOTE — PHYSICAL THERAPY INITIAL EVALUATION ADULT - PERTINENT HX OF CURRENT PROBLEM, REHAB EVAL
52 year old man with progressive exertional angina, no symptoms at rest, EKG with ST-T wave abnormality consistent with ischemia, troponin is not elevated.  He is an active cigarette smoker 0.5 ppd smoker. Study demonstrated severe 3 vessel disease.

## 2022-10-25 NOTE — PROGRESS NOTE ADULT - SUBJECTIVE AND OBJECTIVE BOX
Patient seen and examined at the bedside.    Remained critically ill on continuous ICU monitoring.    OBJECTIVE:  Vital Signs Last 24 Hrs  T(C): 36 (25 Oct 2022 04:00), Max: 36.5 (24 Oct 2022 16:00)  T(F): 96.8 (25 Oct 2022 04:00), Max: 97.7 (24 Oct 2022 16:00)  HR: 83 (25 Oct 2022 07:45) (62 - 90)  BP: --  BP(mean): --  RR: 24 (25 Oct 2022 07:45) (3 - 28)  SpO2: 97% (25 Oct 2022 07:45) (92% - 100%)    Parameters below as of 25 Oct 2022 06:00  Patient On (Oxygen Delivery Method): nasal cannula, high flow  O2 Flow (L/min): 50  O2 Concentration (%): 50      Physical Exam:   General: multiple lines gtt & tubes   Neurology: Nonfocal   Eyes: bilateral pupils equal and reactive   ENT/Neck: Neck supple, trachea midline, No JVD   Respiratory: Rales noted bilaterally   CV: RRR, S1S2, no murmurs        [x] Sternal dressing, [x] Mediastinal CT, [x] L Pleural CT, [x] Bulb        [x] Sinus rhythm, [x] Temporary pacing- VVI @40   Abdominal: Soft, NT, ND +BS,   Extremities: 1-2+ pedal edema noted, + peripheral pulses   Skin: No Rashes, Hematoma, Ecchymosis                                Assessment:  52 year old [place] male, current 0.5 ppd smoker, with no know significant past medical history who presented to the ED at [facility] after being transferred from [facility] with abnormal ECG findings. The patient presented to [facility] earlier today with a 1 year history of exertional chest pain, consistent with typical angina, worse in the last 24 hours. Troponin T at [facility] WNL, hsTroponin ~2 hours later on admission labs WN. On arrival ECG demonstrated ST segmental elevations in anterolateral leads with Q waves suggestive of prior infarct with ongoing symptoms. ECG did not meet criteria for STEMI. He was given brilenta 180 mg PO, loaded with IV heparin and started on heparin ggt. Patient was consented for cardiac catheterization. Patient to be admitted for management of NSTEMI. Seen by Cardiology    CAD s/p CABG 10/24/2022  Hemodynamic instability  Hypovolemia  Lactic acidosis  Leukocytosis   Post op respiratory insufficiency  Acute blood loss anemia  Thrombocytopenia  Stress hyperglycemia    Plan:   ***Neuro***  [x] Nonfocal   Post operative neuro assessment     ***Cardiovascular***  Invasive hemodynamic monitoring, assess perfusion indices   SR / CVP 9/ MAP 73/ Hct 34.5/ Lactate 2.9  [x] Albumin 1000 cc [x] LR 1000 cc; given overnight    [x] Primacor- 0.2 mcgs [x] Cardene 5mg/hr  c/w Amiodarone for A-fib prophylaxis   LV thrombus will require AC ?  Norvasc for BP Management   Monitor chest tube outputs   [x] VTE Prophylaxis with SQ heparin   [x] ASA [x] High intensity Statin   Once lactate normalize will initiate Beta blockers    Serial EKG and cardiac enzymes       ***Pulmonary***  H/O Smoking at risk for post op pulmonary complication  [x] HFO2- 50L/50%  Encourage incentive spirometry, continue pulse ox monitoring, follow ABGs   Lung expansion maneuvers / PRN Bronchodilators for wheezing       Mode: CPAP with PS  FiO2: 40  PEEP: 5  PS: 5  MAP: 7  PIP: 11              ***GI***  [x]  Diet: Tolerating PO consistent carb diet.   [x]  Pepcid  Bowel regimen with Miralax and senna     ***Renal***  Continue to monitor I/Os, BUN/Creatinine.   Replete lytes PRN  Benjamin present      ***ID***  Perioperative coverage with Cefuroxime.      ***Endocrine***  [x] Hyperglycemia  : HbA1c 5.7%              - [x] Insulin gtt              - Need tight glycemic control to prevent wound infection.          Patient requires continuous monitoring with bedside rhythm monitoring, pulse oximetry monitoring, and continuous central venous and arterial pressure monitoring; and intermittent blood gas analysis. Care plan discussed with the ICU care team.   Patient remained critical, at risk for life threatening decompensation.    I have spent 30 minutes providing critical care management to this patient.    By signing my name below, I, Dewayne Tavarez, attest that this documentation has been prepared under the direction and in the presence of RUI Jeter   Electronically signed: Yu Garcia, 10-25-22 @ 08:33    JAN, RUI Jeter, personally performed the services described in this documentation. all medical record entries made by the christianoibe were at my direction and in my presence. I have reviewed the chart and agree that the record reflects my personal performance and is accurate and complete  Electronically signed: RUI Jeter  Patient seen and examined at the bedside.    Remained critically ill on continuous ICU monitoring.    OBJECTIVE:  Vital Signs Last 24 Hrs  T(C): 36 (25 Oct 2022 04:00), Max: 36.5 (24 Oct 2022 16:00)  T(F): 96.8 (25 Oct 2022 04:00), Max: 97.7 (24 Oct 2022 16:00)  HR: 83 (25 Oct 2022 07:45) (62 - 90)  BP: --  BP(mean): --  RR: 24 (25 Oct 2022 07:45) (3 - 28)  SpO2: 97% (25 Oct 2022 07:45) (92% - 100%)    Parameters below as of 25 Oct 2022 06:00  Patient On (Oxygen Delivery Method): nasal cannula, high flow  O2 Flow (L/min): 50  O2 Concentration (%): 50      Physical Exam:   General: multiple lines gtt & tubes   Neurology: Nonfocal   Eyes: bilateral pupils equal and reactive   ENT/Neck: Neck supple, trachea midline, No JVD   Respiratory: Rales noted bilaterally   CV: RRR, S1S2, no murmurs        [x] Sternal dressing, [x] Mediastinal CT, [x] L Pleural CT, [x] Bulb        [x] Sinus rhythm, [x] Temporary pacing- VVI @40   Abdominal: Soft, NT, ND +BS,   Extremities: 1-2+ pedal edema noted, + peripheral pulses   Skin: No Rashes, Hematoma, Ecchymosis                                Assessment:  52 year old [place] male, current 0.5 ppd smoker, with no know significant past medical history who presented to the ED at [facility] after being transferred from [facility] with abnormal ECG findings. The patient presented to [facility] earlier today with a 1 year history of exertional chest pain, consistent with typical angina, worse in the last 24 hours. Troponin T at [facility] WNL, hsTroponin ~2 hours later on admission labs WN. On arrival ECG demonstrated ST segmental elevations in anterolateral leads with Q waves suggestive of prior infarct with ongoing symptoms. ECG did not meet criteria for STEMI. He was given brilenta 180 mg PO, loaded with IV heparin and started on heparin ggt. Patient was consented for cardiac catheterization. Patient to be admitted for management of NSTEMI. Seen by Cardiology    CAD s/p CABG 10/24/2022  Hemodynamic instability  Hypovolemia  Lactic acidosis  Leukocytosis   Post op respiratory insufficiency  Acute blood loss anemia  Thrombocytopenia  Stress hyperglycemia    Plan:   ***Neuro***  [x] Nonfocal   Post operative neuro assessment     ***Cardiovascular***  Low EF post op: 45%, RV dysfunction   Invasive hemodynamic monitoring, assess perfusion indices   SR / CVP 9/ MAP 73/ Hct 34.5/ Lactate 4.5  [x] Albumin 1000 cc [x] LR 1000 cc; given overnight    [x] Primacor- 0.375 mcgs [x] Cardene 5mg/hr  c/w Amiodarone for A-fib prophylaxis   LV thrombus removed today   Norvasc for radial artery graft   Monitor chest tube outputs   [x] VTE Prophylaxis with SQ heparin   [x] ASA [x] High intensity Statin   Once lactate normalize will initiate Beta blockers    Serial EKG and cardiac enzymes   Echo ordered       ***Pulmonary***  H/O Smoking at risk for post op pulmonary complication  [x] HFO2- 50L/40%  Encourage incentive spirometry, continue pulse ox monitoring, follow ABGs   Lung expansion maneuvers / PRN Bronchodilators for wheezing       Mode: CPAP with PS  FiO2: 40  PEEP: 5  PS: 5  MAP: 7  PIP: 11              ***GI***  [x]  Diet: Tolerating PO consistent carb diet.   [x]  Pepcid  Bowel regimen with Miralax and senna     ***Renal***  Continue to monitor I/Os, BUN/Creatinine.   Replete lytes PRN  Benjamin present      ***ID***  Perioperative coverage with Cefuroxime.      ***Endocrine***  [x] Hyperglycemia  : HbA1c 5.7%              - [x] Insulin gtt              - Need tight glycemic control to prevent wound infection.          Patient requires continuous monitoring with bedside rhythm monitoring, pulse oximetry monitoring, and continuous central venous and arterial pressure monitoring; and intermittent blood gas analysis. Care plan discussed with the ICU care team.   Patient remained critical, at risk for life threatening decompensation.    I have spent 30 minutes providing critical care management to this patient.    By signing my name below, I, Dewayne Tavarez, attest that this documentation has been prepared under the direction and in the presence of RUI Jeter   Electronically signed: Yu Garcai, 10-25-22 @ 08:33    Tess MONTOYA PA, personally performed the services described in this documentation. all medical record entries made by the scribe were at my direction and in my presence. I have reviewed the chart and agree that the record reflects my personal performance and is accurate and complete  Electronically signed: RUI Jeter  Patient seen and examined at the bedside.    Remained critically ill on continuous ICU monitoring.    OBJECTIVE:  Vital Signs Last 24 Hrs  T(C): 36 (25 Oct 2022 04:00), Max: 36.5 (24 Oct 2022 16:00)  T(F): 96.8 (25 Oct 2022 04:00), Max: 97.7 (24 Oct 2022 16:00)  HR: 83 (25 Oct 2022 07:45) (62 - 90)  BP: 132/54  BP(mean): 73  RR: 24 (25 Oct 2022 07:45) (3 - 28)  SpO2: 97% (25 Oct 2022 07:45) (92% - 100%)    Parameters below as of 25 Oct 2022 06:00  Patient On (Oxygen Delivery Method): nasal cannula, high flow  O2 Flow (L/min): 50  O2 Concentration (%): 50      Physical Exam:   General: multiple lines gtt & tubes, oobtc, looks comfortable  Neurology: Nonfocal   Eyes: bilateral pupils equal and reactive   ENT/Neck: Neck supple, trachea midline, No JVD   Respiratory: Rales noted bilaterally, on HFNC   CV: RRR, S1S2, no murmurs        [x] Sternal dressing, [x] Mediastinal CT, [x] L Pleural CT, [x] Bulb        [x] Sinus rhythm, [x] Temporary pacing- VVI @40   Abdominal: Soft, NT, ND +BS,   Extremities: 1-2+ pedal edema noted, + peripheral pulses   Skin: No Rashes, Hematoma, Ecchymosis                                Assessment:  52 year old [place] male, current 0.5 ppd smoker, with no know significant past medical history who presented to the ED at [facility] after being transferred from [facility] with abnormal ECG findings. The patient presented to [facility] earlier today with a 1 year history of exertional chest pain, consistent with typical angina, worse in the last 24 hours. Troponin T at [facility] WNL, hsTroponin ~2 hours later on admission labs WN. On arrival ECG demonstrated ST segmental elevations in anterolateral leads with Q waves suggestive of prior infarct with ongoing symptoms. ECG did not meet criteria for STEMI. He was given brilenta 180 mg PO, loaded with IV heparin and started on heparin ggt. Patient was consented for cardiac catheterization. Patient to be admitted for management of NSTEMI. Seen by Cardiology    CAD s/p CABG 10/24/2022  Hemodynamic instability  Hypovolemia  Lactic acidosis  Leukocytosis   Post op respiratory insufficiency  Acute blood loss anemia  Thrombocytopenia  Stress hyperglycemia    Plan:   ***Neuro***  [x] Nonfocal   Post operative neuro assessment     ***Cardiovascular***  Low EF post op: 45%, RV dysfunction   Invasive hemodynamic monitoring, assess perfusion indices   SR / CVP 9/ MAP 73/ Hct 34.5/ Lactate 4.5  [x] Albumin 1000 cc [x] LR 1000 cc; given overnight    [x] Primacor- 0.375 mcgs - increased this morning for elevated lactate and high CVP [x] Cardene 5mg/hr  [x] TTE ordered today to follow up biventricular function  c/w Amiodarone for A-fib prophylaxis   ?LV thrombus - f/up echo report   Norvasc for radial artery graft   Monitor chest tube outputs   [x] VTE Prophylaxis with SQ heparin   [x] ASA [x] High intensity Statin   Once lactate normalize will initiate Beta blockers    Serial EKG and cardiac enzymes   Echo ordered       ***Pulmonary***  H/O Smoking at risk for post op pulmonary complication  [x] HFO2- 50L/40%  Encourage incentive spirometry, continue pulse ox monitoring, follow ABGs   Lung expansion maneuvers / PRN Bronchodilators for wheezing                 ***GI***  [x]  Diet: Tolerating PO consistent carb diet.   [x]  Pepcid  Bowel regimen with Miralax and senna     ***Renal***  Continue to monitor I/Os, BUN/Creatinine.   Replete lytes PRN  Benjamin present      ***ID***  Perioperative coverage with Cefuroxime.      ***Endocrine***  [x] Hyperglycemia  : HbA1c 5.7%              - [x] Insulin gtt              - Need tight glycemic control to prevent wound infection.          Patient requires continuous monitoring with bedside rhythm monitoring, pulse oximetry monitoring, and continuous central venous and arterial pressure monitoring; and intermittent blood gas analysis. Care plan discussed with the ICU care team.   Patient remained critical, at risk for life threatening decompensation.    I have spent 45 minutes providing critical care management to this patient.    By signing my name below, I, Dewayne Tavarez, attest that this documentation has been prepared under the direction and in the presence of RUI Jeter   Electronically signed: Yu Garcia, 10-25-22 @ 08:33    I, RUI Jeter, personally performed the services described in this documentation. all medical record entries made by the scribe were at my direction and in my presence. I have reviewed the chart and agree that the record reflects my personal performance and is accurate and complete  Electronically signed: RUI Jeter

## 2022-10-25 NOTE — PROGRESS NOTE ADULT - SUBJECTIVE AND OBJECTIVE BOX
Patient seen and examined at the bedside.    Remained critically ill on continuous ICU monitoring.    OBJECTIVE:  Vital Signs Last 24 Hrs  T(C): 36.2 (25 Oct 2022 16:00), Max: 36.4 (25 Oct 2022 08:00)  T(F): 97.2 (25 Oct 2022 16:00), Max: 97.5 (25 Oct 2022 08:00)  HR: 88 (25 Oct 2022 19:30) (74 - 97)  BP: 141/58 (25 Oct 2022 16:00) (119/59 - 141/58)  BP(mean): 84 (25 Oct 2022 16:00) (83 - 93)  RR: 16 (25 Oct 2022 19:30) (3 - 49)  SpO2: 97% (25 Oct 2022 19:30) (91% - 100%)    Parameters below as of 25 Oct 2022 16:55  Patient On (Oxygen Delivery Method): nasal cannula, high flow  O2 Flow (L/min): 40  O2 Concentration (%): 40      Physical Exam:   General: multiple lines gtt & tubes, oobtc, looks comfortable  Neurology: Nonfocal   Eyes: bilateral pupils equal and reactive   ENT/Neck: Neck supple, trachea midline, No JVD   Respiratory: Rales noted bilaterally, on HFNC   CV: RRR, S1S2, no murmurs        [x] Sternal dressing, [x] Mediastinal CT, [x] L Pleural CT, [x] Bulb        [x] Sinus rhythm, [x] Temporary pacing- VVI @40   Abdominal: Soft, NT, ND +BS,   Extremities: 1-2+ pedal edema noted, + peripheral pulses   Skin: No Rashes, Hematoma, Ecchymosis                                                 Assessment:  52 year old Faroese male, current 0.5 ppd smoker, with no know significant past medical history who presented to the ED at [facility] after being transferred from [facility] with abnormal ECG findings. The patient presented to [facility] earlier today with a 1 year history of exertional chest pain, consistent with typical angina, worse in the last 24 hours. Troponin T at [facility] WNL, hsTroponin ~2 hours later on admission labs WNL. On arrival ECG demonstrated ST segmental elevations in anterolateral leads with Q waves suggestive of prior infarct with ongoing symptoms. ECG did not meet criteria for STEMI. He was given brilenta 180 mg PO, loaded with IV heparin and started on heparin ggt. Patient was consented for cardiac catheterization. Patient to be admitted for management of NSTEMI. Seen by Cardiology    CAD s/p CABG 10/24/2022  Hemodynamic instability  Hypovolemia  Lactic acidosis  Leukocytosis   Post op respiratory insufficiency  Acute blood loss anemia  Thrombocytopenia  Stress hyperglycemia    Plan:   ***Neuro***  [x] Nonfocal   Post operative neuro assessment     ***Cardiovascular***  Low EF post op: 45%, RV dysfunction   Invasive hemodynamic monitoring, assess perfusion indices   SR / CVP 13/ MAP 88/ Hct 34.5%/ Lactate 2.6   [x] LR 1000 cc; given overnight    [x] Primacor- 0.375 mcgs - increased this morning for elevated lactate and high CVP [x] Cardene 5mg/hr  [x] TTE ordered today to follow up biventricular function  c/w Amiodarone for A-fib prophylaxis   LV thrombus - echo results inconclusive  Norvasc for radial artery graft   Monitor chest tube outputs   [x] VTE Prophylaxis with SQ heparin   [x] ASA [x] High intensity Statin   Serial EKG and cardiac enzymes     ***Pulmonary***  H/O Smoking at risk for post op pulmonary complication  [x] HFO2- 40/40  Encourage incentive spirometry, continue pulse ox monitoring, follow ABGs   Lung expansion maneuvers / PRN Bronchodilators for wheezing             ***GI***  [x]  Diet: Tolerating PO consistent carb diet.   [x]  Pepcid  Bowel regimen with Miralax and senna     ***Renal***  Continue to monitor I/Os, BUN/Creatinine.   Replete lytes PRN  Benjamin present    ***ID***  Perioperative coverage with Cefuroxime.    ***Endocrine***  [x] Hyperglycemia  : HbA1c 5.7%              - Stress Hyperglycemia             - [x] Insulin gtt              - Need tight glycemic control to prevent wound infection.    ALL MEDICATIONS (delete after use)  MEDICATIONS  (STANDING):  acetaminophen     Tablet .. 650 milliGRAM(s) Oral every 6 hours  aMIOdarone    Tablet 400 milliGRAM(s) Oral two times a day  amLODIPine   Tablet 10 milliGRAM(s) Oral daily  ascorbic acid 500 milliGRAM(s) Oral two times a day  aspirin enteric coated 81 milliGRAM(s) Oral daily  atorvastatin 80 milliGRAM(s) Oral at bedtime  cefuroxime  IVPB 1500 milliGRAM(s) IV Intermittent every 8 hours  chlorhexidine 2% Cloths 1 Application(s) Topical daily  dextrose 50% Injectable 50 milliLiter(s) IV Push every 15 minutes  dextrose 50% Injectable 25 milliLiter(s) IV Push every 15 minutes  famotidine Injectable 20 milliGRAM(s) IV Push every 24 hours  gabapentin 100 milliGRAM(s) Oral every 8 hours  heparin  Infusion 800 Unit(s)/Hr (8 mL/Hr) IV Continuous <Continuous>  insulin regular Infusion 3 Unit(s)/Hr (3 mL/Hr) IV Continuous <Continuous>  milrinone Infusion 0.375 MICROgram(s)/kG/Min (7.73 mL/Hr) IV Continuous <Continuous>  niCARdipine Infusion 5 mG/Hr (25 mL/Hr) IV Continuous <Continuous>  polyethylene glycol 3350 17 Gram(s) Oral daily  potassium chloride  10 mEq/50 mL IVPB 10 milliEquivalent(s) IV Intermittent every 1 hour  potassium chloride  10 mEq/50 mL IVPB 10 milliEquivalent(s) IV Intermittent every 1 hour  potassium chloride  10 mEq/50 mL IVPB 10 milliEquivalent(s) IV Intermittent every 1 hour  senna 2 Tablet(s) Oral at bedtime  sodium chloride 0.9%. 1000 milliLiter(s) (10 mL/Hr) IV Continuous <Continuous>    MEDICATIONS  (PRN):  HYDROmorphone  Injectable 0.5 milliGRAM(s) IV Push every 6 hours PRN Breakthrough Pain  oxyCODONE    IR 5 milliGRAM(s) Oral every 4 hours PRN Moderate Pain (4 - 6)  oxyCODONE    IR 10 milliGRAM(s) Oral every 4 hours PRN Severe Pain (7 - 10)      Patient requires continuous monitoring with bedside rhythm monitoring, pulse oximetry monitoring, and continuous central venous and arterial pressure monitoring; and intermittent blood gas analysis. Care plan discussed with the ICU care team.   Patient remained critical, at risk for life threatening decompensation.    I have spent 45 minutes providing critical care management to this patient.    By signing my name below, I, Celia Grant, attest that this documentation has been prepared under the direction and in the presence of Lanette Orellana NP.  Electronically signed: Yu Mcguire, 10-25-22 @ 19:56    I, Lanette Orellana , personally performed the services described in this documentation. all medical record entries made by the scribe were at my direction and in my presence. I have reviewed the chart and agree that the record reflects my personal performance and is accurate and complete  Electronically signed: Lanette Orellana NP. Patient seen and examined at the bedside.    Remained critically ill on continuous ICU monitoring.    OBJECTIVE:  Vital Signs Last 24 Hrs  T(C): 36.2 (25 Oct 2022 16:00), Max: 36.4 (25 Oct 2022 08:00)  T(F): 97.2 (25 Oct 2022 16:00), Max: 97.5 (25 Oct 2022 08:00)  HR: 88 (25 Oct 2022 19:30) (74 - 97)  BP: 141/58 (25 Oct 2022 16:00) (119/59 - 141/58)  BP(mean): 84 (25 Oct 2022 16:00) (83 - 93)  RR: 16 (25 Oct 2022 19:30) (3 - 49)  SpO2: 97% (25 Oct 2022 19:30) (91% - 100%)    Parameters below as of 25 Oct 2022 16:55  Patient On (Oxygen Delivery Method): nasal cannula, high flow  O2 Flow (L/min): 40  O2 Concentration (%): 40      Physical Exam:   General: multiple lines gtt & tubes, oobtc, looks comfortable  Neurology: Nonfocal   Eyes: bilateral pupils equal and reactive   ENT/Neck: Neck supple, trachea midline, No JVD   Respiratory: Rales noted bilaterally, on HFNC   CV: RRR, S1S2, no murmurs        [x] Sternal dressing, [x] Mediastinal CT, [x] L Pleural CT, [x] Bulb        [x] Sinus rhythm, [x] Temporary pacing- VVI @40   Abdominal: Soft, NT, ND +BS,   Extremities: 1-2+ pedal edema noted, + peripheral pulses   Skin: No Rashes, Hematoma, Ecchymosis                                                 Assessment:  52 year old Togolese male, current 0.5 ppd smoker, with no know significant past medical history who presented to the ED at [facility] after being transferred from [facility] with abnormal ECG findings. The patient presented to [facility] earlier today with a 1 year history of exertional chest pain, consistent with typical angina, worse in the last 24 hours. Troponin T at [facility] WNL, hsTroponin ~2 hours later on admission labs WNL. On arrival ECG demonstrated ST segmental elevations in anterolateral leads with Q waves suggestive of prior infarct with ongoing symptoms. ECG did not meet criteria for STEMI. He was given brilenta 180 mg PO, loaded with IV heparin and started on heparin ggt. Patient was consented for cardiac catheterization. Patient to be admitted for management of NSTEMI. Seen by Cardiology    CAD s/p CABG 10/24/2022  Hemodynamic instability  Hypovolemia  Lactic acidosis  Leukocytosis   Post op respiratory insufficiency  Acute blood loss anemia  Thrombocytopenia  Stress hyperglycemia    Plan:   ***Neuro***  [x] Nonfocal   Post operative neuro assessment     ***Cardiovascular***  Low EF post op: 45%, RV dysfunction   Invasive hemodynamic monitoring, assess perfusion indices   TTE on 10/25 revealed (EF 67%), normal MV, normal RV size and function, normal TV, no pericardial effusion seen. LV thrombus - echo results inconclusive  SR / CVP 13/ MAP 88/ Hct 34.5%/ Lactate 2.6  [x] LR 1000 cc; given overnight    [x] Primacor- 0.375 mcgs - increased this morning for elevated lactate and high CVP [x] Cardene 5mg/hr  c/w Amiodarone for A-fib prophylaxis   Norvasc for radial artery graft   Monitor chest tube outputs   [x] VTE Prophylaxis with heparin gtt  [x] ASA [x] High intensity Statin   Serial EKG and cardiac enzymes     ***Pulmonary***  H/O Smoking at risk for post op pulmonary complication  [x] HFO2- 40L/40%  Encourage incentive spirometry, continue pulse ox monitoring, follow ABGs   Lung expansion maneuvers / PRN Bronchodilators for wheezing             ***GI***  [x]  Diet: Tolerating PO consistent carb diet.   [x]  Pepcid  Bowel regimen with Miralax and senna     ***Renal***  Continue to monitor I/Os, BUN/Creatinine.   Replete lytes PRN  Benjamin present    ***ID***  Perioperative coverage with Cefuroxime.    ***Endocrine***  [x] Hyperglycemia  : HbA1c 5.7%              - Stress Hyperglycemia             - [x] Insulin gtt              - Need tight glycemic control to prevent wound infection.        Patient requires continuous monitoring with bedside rhythm monitoring, pulse oximetry monitoring, and continuous central venous and arterial pressure monitoring; and intermittent blood gas analysis. Care plan discussed with the ICU care team.   Patient remained critical, at risk for life threatening decompensation.    I have spent 45 minutes providing critical care management to this patient.    By signing my name below, I, Celia Grant, attest that this documentation has been prepared under the direction and in the presence of Lanette Orellana NP.  Electronically signed: Yu Mcguire, 10-25-22 @ 19:56    I, Lanette Orellana , personally performed the services described in this documentation. all medical record entries made by the christianoibe were at my direction and in my presence. I have reviewed the chart and agree that the record reflects my personal performance and is accurate and complete  Electronically signed: Lanette Orellana NP. Patient seen and examined at the bedside.    Remained critically ill on continuous ICU monitoring.    24 hour events:  rising lactate, peaked at 4.5 with elevated CVP, milrinone started, lactate downtrending. lasix given with adequate response  TTE done, Ef 67%, nml RV and no pericardial effusion. laminar thrombus noted (unchanged from pre/intraop).   remains on HiFlo NC    OBJECTIVE:  Vital Signs Last 24 Hrs  T(C): 36.2 (25 Oct 2022 16:00), Max: 36.4 (25 Oct 2022 08:00)  T(F): 97.2 (25 Oct 2022 16:00), Max: 97.5 (25 Oct 2022 08:00)  HR: 88 (25 Oct 2022 19:30) (74 - 97)  BP: 141/58 (25 Oct 2022 16:00) (119/59 - 141/58)  BP(mean): 84 (25 Oct 2022 16:00) (83 - 93)  RR: 16 (25 Oct 2022 19:30) (3 - 49)  SpO2: 97% (25 Oct 2022 19:30) (91% - 100%)    Parameters below as of 25 Oct 2022 16:55  Patient On (Oxygen Delivery Method): nasal cannula, high flow  O2 Flow (L/min): 40  O2 Concentration (%): 40      Physical Exam:   General: OOBTC, NAD, on HiFLo  Neurology: Nonfocal   Eyes: bilateral pupils equal and reactive   ENT/Neck: Neck supple, trachea midline, No JVD . loose tooth-attatched to string for precaution  Respiratory: Rales noted bilaterally, on HFNC   CV: RRR, S1S2, no murmurs        [x] Sternal dressing, [x] Mediastinal CT, [x] L Pleural CT, [x] Leg Bulb        [x] Sinus rhythm, [x] Temporary pacing- VVI @40   Abdominal: Soft, NT, ND +BS,   Extremities: 1-2+ pedal edema noted, + peripheral pulses   Skin: No Rashes, Hematoma, Ecchymosis                                                 Assessment:  52 year old French male, current 0.5 ppd smoker, with no know significant past medical history who presented to the ED at [facility] after being transferred from [facility] with abnormal ECG findings. The patient presented to [facility] earlier today with a 1 year history of exertional chest pain, consistent with typical angina, worse in the last 24 hours. Troponin T at [facility] WNL, hsTroponin ~2 hours later on admission labs WNL. On arrival ECG demonstrated ST segmental elevations in anterolateral leads with Q waves suggestive of prior infarct with ongoing symptoms. ECG did not meet criteria for STEMI. He was given brilenta 180 mg PO, loaded with IV heparin and started on heparin ggt. Patient was consented for cardiac catheterization. Patient to be admitted for management of NSTEMI. Seen by Cardiology    CAD s/p CABG 10/24/2022  Hemodynamic instability  Hypovolemia  Lactic acidosis  Leukocytosis   Post op respiratory insufficiency  Acute blood loss anemia  Thrombocytopenia  Stress hyperglycemia    Plan:   ***Neuro***  [x] Nonfocal   Post operative neuro assessment   continue prn dilaudid/oxy for analgesia with gabapentin and APAP for adjunct therapy    ***Cardiovascular***  Low EF post op: 45%, RV dysfunction   Invasive hemodynamic monitoring, assess perfusion indices   TTE on 10/25 revealed (EF 67%), normal MV, normal RV size and function, normal TV, no pericardial effusion seen. LV thrombus - echo results inconclusive  SR / CVP 13/ MAP 88/ Hct 34.5%/ Lactate 2.6  [x] Primacor- 0.375 mcgs - increased this morning for elevated lactate and high CVP   [x] Cardene 5mg/hr for hypertesion  holding BB in setting of inotropic support  c/w Amiodarone for A-fib prophylaxis   Norvasc for radial artery graft   Monitor chest tube outputs   [x] VTE Prophylaxis with heparin gtt  [x] ASA [x] High intensity Statin   Serial EKG and cardiac enzymes   Heparin drip for laminar thrombus    ***Pulmonary***  H/O Smoking at risk for post op pulmonary complication  [x] HFO2- 40L/40%, wean as tolerated for sat > 90%  Encourage incentive spirometry, continue pulse ox monitoring, follow ABGs   Lung expansion maneuvers / PRN Bronchodilators for wheezing /pulmonary toileting             ***GI***  [x]  Diet: Tolerating PO consistent carb diet.   [x]  Pepcid  Bowel regimen with Miralax and senna     ***Renal***  Continue to monitor I/Os, BUN/Creatinine.   Replete lytes PRN  Benjamin present  prn lasix for negative fluid goal    ***ID***  Perioperative coverage with Cefuroxime.    ***Endocrine***  [x] Hyperglycemia  : HbA1c 5.7%              - Stress Hyperglycemia             - [x] Insulin gtt              - Need tight glycemic control to prevent wound infection.        Patient requires continuous monitoring with bedside rhythm monitoring, pulse oximetry monitoring, and continuous central venous and arterial pressure monitoring; and intermittent blood gas analysis. Care plan discussed with the ICU care team.   Patient remained critical, at risk for life threatening decompensation.    I have spent 45 minutes providing critical care management to this patient.    By signing my name below, I, Celia Grant, attest that this documentation has been prepared under the direction and in the presence of Lanette Orellana NP.  Electronically signed: Yu Mcguire, 10-25-22 @ 19:56    I, Lanette Orellana , personally performed the services described in this documentation. all medical record entries made by the christianoibe were at my direction and in my presence. I have reviewed the chart and agree that the record reflects my personal performance and is accurate and complete  Electronically signed: Lanette Orellana NP.

## 2022-10-25 NOTE — OCCUPATIONAL THERAPY INITIAL EVALUATION ADULT - ADL RETRAINING, OT EVAL
Patient will dress lower body (I), AE as needed in 2 weeks. Patient will dress upper body (I) following sternal precautions within 2 weeks

## 2022-10-25 NOTE — OCCUPATIONAL THERAPY INITIAL EVALUATION ADULT - LIVES WITH, PROFILE
Pvt home, 3 steps to enter with handrail. 1st floor set-up. (I) ADLs and functional transfers without AD/DME. (-) /alone

## 2022-10-25 NOTE — OCCUPATIONAL THERAPY INITIAL EVALUATION ADULT - GENERAL OBSERVATIONS, REHAB EVAL
Patient received in bedside chair, +tele, pulse ox, +IV, triple lumen, a-line, chest tube x 2, nolen, external pacemaker, HFNC, sequentials donned

## 2022-10-26 LAB
ALBUMIN SERPL ELPH-MCNC: 4 G/DL — SIGNIFICANT CHANGE UP (ref 3.3–5)
ALP SERPL-CCNC: 34 U/L — LOW (ref 40–120)
ALT FLD-CCNC: 68 U/L — HIGH (ref 10–45)
ANION GAP SERPL CALC-SCNC: 10 MMOL/L — SIGNIFICANT CHANGE UP (ref 5–17)
APTT BLD: 40.3 SEC — HIGH (ref 27.5–35.5)
APTT BLD: 49.5 SEC — HIGH (ref 27.5–35.5)
APTT BLD: 49.8 SEC — HIGH (ref 27.5–35.5)
APTT BLD: 85.2 SEC — HIGH (ref 27.5–35.5)
AST SERPL-CCNC: 44 U/L — HIGH (ref 10–40)
BASE EXCESS BLDV CALC-SCNC: 1.3 MMOL/L — SIGNIFICANT CHANGE UP (ref -2–3)
BASE EXCESS BLDV CALC-SCNC: 1.3 MMOL/L — SIGNIFICANT CHANGE UP (ref -2–3)
BILIRUB SERPL-MCNC: 0.5 MG/DL — SIGNIFICANT CHANGE UP (ref 0.2–1.2)
BUN SERPL-MCNC: 19 MG/DL — SIGNIFICANT CHANGE UP (ref 7–23)
CA-I SERPL-SCNC: 1.17 MMOL/L — SIGNIFICANT CHANGE UP (ref 1.15–1.33)
CA-I SERPL-SCNC: 1.18 MMOL/L — SIGNIFICANT CHANGE UP (ref 1.15–1.33)
CALCIUM SERPL-MCNC: 8.9 MG/DL — SIGNIFICANT CHANGE UP (ref 8.4–10.5)
CHLORIDE BLDV-SCNC: 103 MMOL/L — SIGNIFICANT CHANGE UP (ref 96–108)
CHLORIDE BLDV-SCNC: 104 MMOL/L — SIGNIFICANT CHANGE UP (ref 96–108)
CHLORIDE SERPL-SCNC: 105 MMOL/L — SIGNIFICANT CHANGE UP (ref 96–108)
CO2 BLDV-SCNC: 27 MMOL/L — HIGH (ref 22–26)
CO2 BLDV-SCNC: 27 MMOL/L — HIGH (ref 22–26)
CO2 SERPL-SCNC: 23 MMOL/L — SIGNIFICANT CHANGE UP (ref 22–31)
CREAT SERPL-MCNC: 0.88 MG/DL — SIGNIFICANT CHANGE UP (ref 0.5–1.3)
EGFR: 103 ML/MIN/1.73M2 — SIGNIFICANT CHANGE UP
GAS PNL BLDV: 134 MMOL/L — LOW (ref 136–145)
GAS PNL BLDV: 134 MMOL/L — LOW (ref 136–145)
GAS PNL BLDV: SIGNIFICANT CHANGE UP
GLUCOSE BLDC GLUCOMTR-MCNC: 109 MG/DL — HIGH (ref 70–99)
GLUCOSE BLDC GLUCOMTR-MCNC: 115 MG/DL — HIGH (ref 70–99)
GLUCOSE BLDC GLUCOMTR-MCNC: 118 MG/DL — HIGH (ref 70–99)
GLUCOSE BLDC GLUCOMTR-MCNC: 120 MG/DL — HIGH (ref 70–99)
GLUCOSE BLDC GLUCOMTR-MCNC: 120 MG/DL — HIGH (ref 70–99)
GLUCOSE BLDC GLUCOMTR-MCNC: 122 MG/DL — HIGH (ref 70–99)
GLUCOSE BLDC GLUCOMTR-MCNC: 128 MG/DL — HIGH (ref 70–99)
GLUCOSE BLDC GLUCOMTR-MCNC: 140 MG/DL — HIGH (ref 70–99)
GLUCOSE BLDC GLUCOMTR-MCNC: 177 MG/DL — HIGH (ref 70–99)
GLUCOSE BLDC GLUCOMTR-MCNC: 91 MG/DL — SIGNIFICANT CHANGE UP (ref 70–99)
GLUCOSE BLDV-MCNC: 124 MG/DL — HIGH (ref 70–99)
GLUCOSE BLDV-MCNC: 147 MG/DL — HIGH (ref 70–99)
GLUCOSE SERPL-MCNC: 123 MG/DL — HIGH (ref 70–99)
HCO3 BLDV-SCNC: 26 MMOL/L — SIGNIFICANT CHANGE UP (ref 22–29)
HCO3 BLDV-SCNC: 26 MMOL/L — SIGNIFICANT CHANGE UP (ref 22–29)
HCT VFR BLD CALC: 30.9 % — LOW (ref 39–50)
HCT VFR BLDA CALC: 30 % — LOW (ref 39–51)
HCT VFR BLDA CALC: 33 % — LOW (ref 39–51)
HGB BLD CALC-MCNC: 10.9 G/DL — LOW (ref 12.6–17.4)
HGB BLD CALC-MCNC: 9.9 G/DL — LOW (ref 12.6–17.4)
HGB BLD-MCNC: 10.5 G/DL — LOW (ref 13–17)
HOROWITZ INDEX BLDV+IHG-RTO: 40 — SIGNIFICANT CHANGE UP
HOROWITZ INDEX BLDV+IHG-RTO: 44 — SIGNIFICANT CHANGE UP
LACTATE BLDV-MCNC: 0.9 MMOL/L — SIGNIFICANT CHANGE UP (ref 0.5–2)
LACTATE BLDV-MCNC: 1.9 MMOL/L — SIGNIFICANT CHANGE UP (ref 0.5–2)
MAGNESIUM SERPL-MCNC: 2.2 MG/DL — SIGNIFICANT CHANGE UP (ref 1.6–2.6)
MCHC RBC-ENTMCNC: 32.2 PG — SIGNIFICANT CHANGE UP (ref 27–34)
MCHC RBC-ENTMCNC: 34 GM/DL — SIGNIFICANT CHANGE UP (ref 32–36)
MCV RBC AUTO: 94.8 FL — SIGNIFICANT CHANGE UP (ref 80–100)
NRBC # BLD: 0 /100 WBCS — SIGNIFICANT CHANGE UP (ref 0–0)
PCO2 BLDV: 40 MMHG — LOW (ref 42–55)
PCO2 BLDV: 40 MMHG — LOW (ref 42–55)
PH BLDV: 7.42 — SIGNIFICANT CHANGE UP (ref 7.32–7.43)
PH BLDV: 7.42 — SIGNIFICANT CHANGE UP (ref 7.32–7.43)
PHOSPHATE SERPL-MCNC: 3.1 MG/DL — SIGNIFICANT CHANGE UP (ref 2.5–4.5)
PLATELET # BLD AUTO: 126 K/UL — LOW (ref 150–400)
PO2 BLDV: 32 MMHG — SIGNIFICANT CHANGE UP (ref 25–45)
PO2 BLDV: 37 MMHG — SIGNIFICANT CHANGE UP (ref 25–45)
POTASSIUM BLDV-SCNC: 3.6 MMOL/L — SIGNIFICANT CHANGE UP (ref 3.5–5.1)
POTASSIUM BLDV-SCNC: 4.4 MMOL/L — SIGNIFICANT CHANGE UP (ref 3.5–5.1)
POTASSIUM BLDV-SCNC: 4.7 MMOL/L — SIGNIFICANT CHANGE UP (ref 3.5–5.1)
POTASSIUM SERPL-MCNC: 4.8 MMOL/L — SIGNIFICANT CHANGE UP (ref 3.5–5.3)
POTASSIUM SERPL-SCNC: 4.8 MMOL/L — SIGNIFICANT CHANGE UP (ref 3.5–5.3)
PROT SERPL-MCNC: 6.3 G/DL — SIGNIFICANT CHANGE UP (ref 6–8.3)
RBC # BLD: 3.26 M/UL — LOW (ref 4.2–5.8)
RBC # FLD: 12.1 % — SIGNIFICANT CHANGE UP (ref 10.3–14.5)
SAO2 % BLDV: 54.6 % — LOW (ref 67–88)
SAO2 % BLDV: 63.4 % — LOW (ref 67–88)
SODIUM SERPL-SCNC: 138 MMOL/L — SIGNIFICANT CHANGE UP (ref 135–145)
WBC # BLD: 19.71 K/UL — HIGH (ref 3.8–10.5)
WBC # FLD AUTO: 19.71 K/UL — HIGH (ref 3.8–10.5)

## 2022-10-26 PROCEDURE — 71045 X-RAY EXAM CHEST 1 VIEW: CPT | Mod: 26,76

## 2022-10-26 PROCEDURE — 99291 CRITICAL CARE FIRST HOUR: CPT | Mod: 24

## 2022-10-26 PROCEDURE — 99292 CRITICAL CARE ADDL 30 MIN: CPT | Mod: 24

## 2022-10-26 RX ORDER — DEXTROSE 50 % IN WATER 50 %
15 SYRINGE (ML) INTRAVENOUS ONCE
Refills: 0 | Status: DISCONTINUED | OUTPATIENT
Start: 2022-10-26 | End: 2022-10-26

## 2022-10-26 RX ORDER — METOCLOPRAMIDE HCL 10 MG
10 TABLET ORAL EVERY 8 HOURS
Refills: 0 | Status: COMPLETED | OUTPATIENT
Start: 2022-10-26 | End: 2022-10-27

## 2022-10-26 RX ORDER — HYDROMORPHONE HYDROCHLORIDE 2 MG/ML
0.5 INJECTION INTRAMUSCULAR; INTRAVENOUS; SUBCUTANEOUS ONCE
Refills: 0 | Status: DISCONTINUED | OUTPATIENT
Start: 2022-10-26 | End: 2022-10-26

## 2022-10-26 RX ORDER — POTASSIUM CHLORIDE 20 MEQ
40 PACKET (EA) ORAL ONCE
Refills: 0 | Status: COMPLETED | OUTPATIENT
Start: 2022-10-26 | End: 2022-10-26

## 2022-10-26 RX ORDER — INSULIN LISPRO 100/ML
VIAL (ML) SUBCUTANEOUS
Refills: 0 | Status: DISCONTINUED | OUTPATIENT
Start: 2022-10-26 | End: 2022-10-29

## 2022-10-26 RX ORDER — AMLODIPINE BESYLATE 2.5 MG/1
5 TABLET ORAL DAILY
Refills: 0 | Status: DISCONTINUED | OUTPATIENT
Start: 2022-10-27 | End: 2022-10-29

## 2022-10-26 RX ORDER — INSULIN LISPRO 100/ML
VIAL (ML) SUBCUTANEOUS AT BEDTIME
Refills: 0 | Status: DISCONTINUED | OUTPATIENT
Start: 2022-10-26 | End: 2022-10-29

## 2022-10-26 RX ORDER — GLUCAGON INJECTION, SOLUTION 0.5 MG/.1ML
1 INJECTION, SOLUTION SUBCUTANEOUS ONCE
Refills: 0 | Status: DISCONTINUED | OUTPATIENT
Start: 2022-10-26 | End: 2022-10-26

## 2022-10-26 RX ORDER — FUROSEMIDE 40 MG
20 TABLET ORAL ONCE
Refills: 0 | Status: COMPLETED | OUTPATIENT
Start: 2022-10-26 | End: 2022-10-26

## 2022-10-26 RX ORDER — SODIUM CHLORIDE 9 MG/ML
1000 INJECTION, SOLUTION INTRAVENOUS
Refills: 0 | Status: DISCONTINUED | OUTPATIENT
Start: 2022-10-26 | End: 2022-10-26

## 2022-10-26 RX ADMIN — FAMOTIDINE 20 MILLIGRAM(S): 10 INJECTION INTRAVENOUS at 17:36

## 2022-10-26 RX ADMIN — Medication 20 MILLIGRAM(S): at 13:30

## 2022-10-26 RX ADMIN — Medication 2: at 13:48

## 2022-10-26 RX ADMIN — Medication 650 MILLIGRAM(S): at 13:29

## 2022-10-26 RX ADMIN — GABAPENTIN 100 MILLIGRAM(S): 400 CAPSULE ORAL at 13:30

## 2022-10-26 RX ADMIN — POLYETHYLENE GLYCOL 3350 17 GRAM(S): 17 POWDER, FOR SOLUTION ORAL at 13:30

## 2022-10-26 RX ADMIN — ATORVASTATIN CALCIUM 80 MILLIGRAM(S): 80 TABLET, FILM COATED ORAL at 22:06

## 2022-10-26 RX ADMIN — AMLODIPINE BESYLATE 10 MILLIGRAM(S): 2.5 TABLET ORAL at 05:11

## 2022-10-26 RX ADMIN — Medication 650 MILLIGRAM(S): at 14:15

## 2022-10-26 RX ADMIN — Medication 650 MILLIGRAM(S): at 18:44

## 2022-10-26 RX ADMIN — Medication 10 MILLIGRAM(S): at 03:28

## 2022-10-26 RX ADMIN — SENNA PLUS 2 TABLET(S): 8.6 TABLET ORAL at 22:06

## 2022-10-26 RX ADMIN — GABAPENTIN 100 MILLIGRAM(S): 400 CAPSULE ORAL at 05:11

## 2022-10-26 RX ADMIN — Medication 650 MILLIGRAM(S): at 17:34

## 2022-10-26 RX ADMIN — Medication 10 MILLIGRAM(S): at 13:31

## 2022-10-26 RX ADMIN — Medication 10 MILLIGRAM(S): at 22:06

## 2022-10-26 RX ADMIN — FAMOTIDINE 20 MILLIGRAM(S): 10 INJECTION INTRAVENOUS at 05:11

## 2022-10-26 RX ADMIN — HYDROMORPHONE HYDROCHLORIDE 0.5 MILLIGRAM(S): 2 INJECTION INTRAMUSCULAR; INTRAVENOUS; SUBCUTANEOUS at 01:15

## 2022-10-26 RX ADMIN — Medication 81 MILLIGRAM(S): at 13:29

## 2022-10-26 RX ADMIN — GABAPENTIN 100 MILLIGRAM(S): 400 CAPSULE ORAL at 22:06

## 2022-10-26 RX ADMIN — Medication 1 PATCH: at 03:56

## 2022-10-26 RX ADMIN — HYDROMORPHONE HYDROCHLORIDE 0.5 MILLIGRAM(S): 2 INJECTION INTRAMUSCULAR; INTRAVENOUS; SUBCUTANEOUS at 01:00

## 2022-10-26 RX ADMIN — Medication 1 PATCH: at 03:51

## 2022-10-26 RX ADMIN — CHLORHEXIDINE GLUCONATE 1 APPLICATION(S): 213 SOLUTION TOPICAL at 22:06

## 2022-10-26 RX ADMIN — AMIODARONE HYDROCHLORIDE 400 MILLIGRAM(S): 400 TABLET ORAL at 17:34

## 2022-10-26 RX ADMIN — Medication 40 MILLIEQUIVALENT(S): at 18:17

## 2022-10-26 RX ADMIN — Medication 500 MILLIGRAM(S): at 05:11

## 2022-10-26 RX ADMIN — Medication 650 MILLIGRAM(S): at 05:10

## 2022-10-26 RX ADMIN — Medication 500 MILLIGRAM(S): at 17:34

## 2022-10-26 RX ADMIN — AMIODARONE HYDROCHLORIDE 400 MILLIGRAM(S): 400 TABLET ORAL at 05:11

## 2022-10-26 NOTE — DIETITIAN INITIAL EVALUATION ADULT - OTHER INFO
-- Pt reports UBW 140lbs without any known recent changes. Noted pt 147.9lbs on 10/22. Post-op pt 170lbs (10/26), weight gain likely 2/2 perioperative fluid shifts. Will continue to monitor.  -- Pt denies any hx of pre-DM/DM. HbA1c 5.7%. Pt currently ordered for SSI for BG management post-op.  -- On Milrinone gtt.   -- IVF: NS @ 10mL/hr

## 2022-10-26 NOTE — DIETITIAN INITIAL EVALUATION ADULT - ENERGY INTAKE
Fair (50-75%) Pt with noted good PO intake prior to CABG, states he was able to eat fairly this morning post-op. Pt amenable to oral nutrition supplements to promote PO intake.

## 2022-10-26 NOTE — DIETITIAN INITIAL EVALUATION ADULT - ADD RECOMMEND
Provide encouragement with PO intake, menu selections, and assistance with meals as needed. Reinforce nutrition education as needed - RD remains available. Continue to monitor nutritional intake, labs, weights, BM, skin, clinical course.

## 2022-10-26 NOTE — DIETITIAN INITIAL EVALUATION ADULT - NSFNSGIIOFT_GEN_A_CORE
10-25-22 @ 07:01  -  10-26-22 @ 07:00  --------------------------------------------------------  OUT:    Chest Tube (mL): 310 mL    Chest Tube (mL): 98 mL  Total OUT: 408 mL    Total NET: -408 mL      10-26-22 @ 07:01  -  10-26-22 @ 09:28  --------------------------------------------------------  OUT:    Chest Tube (mL): 0 mL    Chest Tube (mL): 10 mL  Total OUT: 10 mL    Total NET: -10 mL       Pt denies any N/V, last  10/21, bowel regimen ordered.     10-25-22 @ 07:01  -  10-26-22 @ 07:00  --------------------------------------------------------  OUT:    Chest Tube (mL): 310 mL    Chest Tube (mL): 98 mL  Total OUT: 408 mL    Total NET: -408 mL      10-26-22 @ 07:01  -  10-26-22 @ 09:28  --------------------------------------------------------  OUT:    Chest Tube (mL): 0 mL    Chest Tube (mL): 10 mL  Total OUT: 10 mL    Total NET: -10 mL

## 2022-10-26 NOTE — DIETITIAN INITIAL EVALUATION ADULT - ORAL INTAKE PTA/DIET HISTORY
Pt reports having a good appetite and PO intake PTA; consuming >75% of most meals. Follows regular diet. Pt denies any known food allergies or intolerances. Pt denies any micronutrient supplementation at home. Denies any difficulty chewing/swallowing at meals.

## 2022-10-26 NOTE — DIETITIAN INITIAL EVALUATION ADULT - PERSON TAUGHT/METHOD
RD reviewed nutrition therapy for s/p CABG with midsternal incision. Emphasized importance of adequate lean protein intake and optimal blood glucose levels for wound healing. Advised pt to limit concentrated sweets, portion control, and importance of fiber intake. Pt made aware RD to remain available./verbal instruction/teach back - (Patient repeats in own words)/patient instructed

## 2022-10-26 NOTE — DIETITIAN INITIAL EVALUATION ADULT - PERTINENT MEDS FT
MEDICATIONS  (STANDING):  acetaminophen     Tablet .. 650 milliGRAM(s) Oral every 6 hours  aMIOdarone    Tablet 400 milliGRAM(s) Oral two times a day  amLODIPine   Tablet 10 milliGRAM(s) Oral daily  ascorbic acid 500 milliGRAM(s) Oral two times a day  aspirin enteric coated 81 milliGRAM(s) Oral daily  atorvastatin 80 milliGRAM(s) Oral at bedtime  bisacodyl Suppository 10 milliGRAM(s) Rectal once  chlorhexidine 2% Cloths 1 Application(s) Topical daily  dextrose 5%. 1000 milliLiter(s) (50 mL/Hr) IV Continuous <Continuous>  dextrose 5%. 1000 milliLiter(s) (100 mL/Hr) IV Continuous <Continuous>  dextrose 50% Injectable 50 milliLiter(s) IV Push every 15 minutes  dextrose 50% Injectable 25 milliLiter(s) IV Push every 15 minutes  famotidine    Tablet 20 milliGRAM(s) Oral two times a day  gabapentin 100 milliGRAM(s) Oral every 8 hours  glucagon  Injectable 1 milliGRAM(s) IntraMuscular once  heparin  Infusion 800 Unit(s)/Hr (7.5 mL/Hr) IV Continuous <Continuous>  insulin lispro (ADMELOG) corrective regimen sliding scale   SubCutaneous three times a day before meals  insulin lispro (ADMELOG) corrective regimen sliding scale   SubCutaneous at bedtime  metoclopramide Injectable 10 milliGRAM(s) IV Push every 8 hours  milrinone Infusion 0.3 MICROgram(s)/kG/Min (6.18 mL/Hr) IV Continuous <Continuous>  niCARdipine Infusion 5 mG/Hr (25 mL/Hr) IV Continuous <Continuous>  polyethylene glycol 3350 17 Gram(s) Oral daily  potassium chloride  10 mEq/50 mL IVPB 10 milliEquivalent(s) IV Intermittent every 1 hour  potassium chloride  10 mEq/50 mL IVPB 10 milliEquivalent(s) IV Intermittent every 1 hour  potassium chloride  10 mEq/50 mL IVPB 10 milliEquivalent(s) IV Intermittent every 1 hour  senna 2 Tablet(s) Oral at bedtime  sodium chloride 0.9%. 1000 milliLiter(s) (10 mL/Hr) IV Continuous <Continuous>    MEDICATIONS  (PRN):  dextrose Oral Gel 15 Gram(s) Oral once PRN Blood Glucose LESS THAN 70 milliGRAM(s)/deciliter  HYDROmorphone  Injectable 0.5 milliGRAM(s) IV Push every 6 hours PRN Breakthrough Pain  oxyCODONE    IR 5 milliGRAM(s) Oral every 4 hours PRN Moderate Pain (4 - 6)  oxyCODONE    IR 10 milliGRAM(s) Oral every 4 hours PRN Severe Pain (7 - 10)

## 2022-10-26 NOTE — DIETITIAN INITIAL EVALUATION ADULT - ETIOLOGY
increased physiological demand for nutrients 2/2 surgical healing limited exposure to nutrition related topics

## 2022-10-26 NOTE — DIETITIAN INITIAL EVALUATION ADULT - PERTINENT LABORATORY DATA
10-26    138  |  105  |  19  ----------------------------<  123<H>  4.8   |  23  |  0.88    Ca    8.9      26 Oct 2022 00:34  Phos  3.1     10-26  Mg     2.2     10-26    TPro  6.3  /  Alb  4.0  /  TBili  0.5  /  DBili  x   /  AST  44<H>  /  ALT  68<H>  /  AlkPhos  34<L>  10-26  POCT Blood Glucose.: 91 mg/dL (10-26-22 @ 07:01)  A1C with Estimated Average Glucose Result: 5.7 % (10-18-22 @ 01:19)

## 2022-10-26 NOTE — DIETITIAN INITIAL EVALUATION ADULT - REASON FOR ADMISSION
Pt is a 52 year old man with progressive exertional angina, no symptoms at rest, EKG with ST-T wave abnormality consistent with ischemia, troponin is not elevated.  He is an active cigarette smoker 0.5 ppd smoker. Study demonstrated severe 3 vessel disease. Echo demonstrates apical akinesis, apical thrombus, overall EF 45%. S/p CABG x 3 10/25.

## 2022-10-26 NOTE — PROGRESS NOTE ADULT - SUBJECTIVE AND OBJECTIVE BOX
Patient seen and examined at the bedside.    Remained critically ill on continuous ICU monitoring.    OBJECTIVE:  Vital Signs Last 24 Hrs  T(C): 36.7 (26 Oct 2022 16:00), Max: 36.7 (26 Oct 2022 16:00)  T(F): 98 (26 Oct 2022 16:00), Max: 98 (26 Oct 2022 16:00)  HR: 76 (26 Oct 2022 19:00) (70 - 93)  BP: 119/63 (26 Oct 2022 03:45) (119/63 - 119/63)  BP(mean): 85 (26 Oct 2022 03:45) (85 - 85)  RR: 18 (26 Oct 2022 19:00) (6 - 35)  SpO2: 96% (26 Oct 2022 19:00) (92% - 100%)    Parameters below as of 26 Oct 2022 16:00  Patient On (Oxygen Delivery Method): nasal cannula  O2 Flow (L/min): 6    Physical Exam:   General: OOBTC, NAD, on NC  Neurology: Nonfocal   Eyes: bilateral pupils equal and reactive   ENT/Neck: Neck supple, trachea midline, No JVD  Respiratory: Rales noted bilaterally  CV: RRR, S1S2, no murmurs        [x] Sternal dressing, [x] Mediastinal CT [x] Bulb        [x] Sinus rhythm, [x] Temporary pacing- VVI @40   Abdominal: Soft, NT, ND +BS,   Extremities: 1-2+ pedal edema noted, + peripheral pulses , + R leg at HAWK harvest site  Skin: No Rashes, Hematoma, Ecchymosis                          Assessment:  52 year old [place] male, current 0.5 ppd smoker, with no know significant past medical history who presented to the ED at [facility] after being transferred from [facility] with abnormal ECG findings. The patient presented to [facility] earlier today with a 1 year history of exertional chest pain, consistent with typical angina, worse in the last 24 hours. Troponin T at [facility] WNL, hsTroponin ~2 hours later on admission labs WNL. On arrival ECG demonstrated ST segmental elevations in anterolateral leads with Q waves suggestive of prior infarct with ongoing symptoms. ECG did not meet criteria for STEMI. He was given brilenta 180 mg PO, loaded with IV heparin and started on heparin ggt. Patient was consented for cardiac catheterization. Patient to be admitted for management of NSTEMI. Seen by Cardiology    CAD s/p CABG 10/24/2022  Hemodynamic instability  Hypovolemia  Leukocytosis   Post op respiratory insufficiency  Acute blood loss anemia  Thrombocytopenia  Stress hyperglycemia    Plan:   ***Neuro***  [x] Nonfocal   Post operative neuro assessment   continue prn oxy for analgesia with gabapentin and APAP for adjunct therapy    ***Cardiovascular***  Low EF post op: 45%, RV dysfunction   Invasive hemodynamic monitoring, assess perfusion indices   TTE on 10/25 revealed (EF 67%), normal MV, normal RV size and function, normal TV, no pericardial effusion seen. LV thrombus - echo results inconclusive  SR / CVP 5/ MAP 63/ Hct 30.9%/ Lactate 0.9  [x] Primacor- 0.2 mcgs, wean as tolerated   holding BB in setting of inotropic support  c/w Amiodarone for A-fib prophylaxis   Norvasc for radial artery graft   Monitor chest tube outputs   [x] Heparin drip for laminar thrombus  [x] ASA [x] High intensity Statin   Serial EKG and cardiac enzymes       ***Pulmonary***  H/O Smoking at risk for post op pulmonary complication  [x] 6L NC   Encourage incentive spirometry, continue pulse ox monitoring, follow ABGs   Lung expansion maneuvers            ***GI***  [x]  Diet: Tolerating PO consistent carb diet.   [x]  Pepcid  Bowel regimen with Miralax and senna   Reglan for gut motility     ***Renal***  Continue to monitor I/Os, BUN/Creatinine.   Replete lytes PRN  Benjamin present    ***ID***  No active antibiotic coverage      ***Endocrine***  [x] Hyperglycemia  : HbA1c 5.7%              - [x] ISS             - Need tight glycemic control to prevent wound infection.      Patient requires continuous monitoring with bedside rhythm monitoring, pulse oximetry monitoring, and continuous central venous and arterial pressure monitoring; and intermittent blood gas analysis. Care plan discussed with the ICU care team.   Patient remained critical, at risk for life threatening decompensation.    I have spent 40 minutes providing critical care management to this patient.    By signing my name below, I, Celia Grant, attest that this documentation has been prepared under the direction and in the presence of Corrie Sanders NP.  Electronically signed: Yu Mcguire, 10-26-22 @ 21:16    I, Corrie Sanders , personally performed the services described in this documentation. all medical record entries made by the scribe were at my direction and in my presence. I have reviewed the chart and agree that the record reflects my personal performance and is accurate and complete  Electronically signed: Corrie Sanders NP.

## 2022-10-26 NOTE — PROGRESS NOTE ADULT - SUBJECTIVE AND OBJECTIVE BOX
Patient seen and examined at the bedside.    Remained critically ill on continuous ICU monitoring.    OBJECTIVE:  Vital Signs Last 24 Hrs  T(C): 36.2 (26 Oct 2022 04:00), Max: 37 (25 Oct 2022 20:00)  T(F): 97.1 (26 Oct 2022 04:00), Max: 98.6 (25 Oct 2022 20:00)  HR: 76 (26 Oct 2022 09:30) (71 - 97)  BP: 119/63 (26 Oct 2022 03:45) (119/59 - 141/58)  BP(mean): 85 (26 Oct 2022 03:45) (83 - 93)  RR: 19 (26 Oct 2022 09:30) (6 - 35)  SpO2: 94% (26 Oct 2022 09:30) (91% - 100%)    Parameters below as of 26 Oct 2022 04:00  Patient On (Oxygen Delivery Method): nasal cannula, high flow  O2 Flow (L/min): 40  O2 Concentration (%): 40      Physical Exam:   General: OOBTC, NAD, on HiFLo  Neurology: Nonfocal   Eyes: bilateral pupils equal and reactive   ENT/Neck: Neck supple, trachea midline, No JVD . loose tooth-attatched to string for precaution  Respiratory: Rales noted bilaterally, on HFNC   CV: RRR, S1S2, no murmurs        [x] Sternal dressing, [x] Mediastinal CT, [x] L Pleural CT, [x] Leg Bulb        [x] Sinus rhythm, [x] Temporary pacing- VVI @40   Abdominal: Soft, NT, ND +BS,   Extremities: 1-2+ pedal edema noted, + peripheral pulses   Skin: No Rashes, Hematoma, Ecchymosis                             Assessment:  52 year old [place] male, current 0.5 ppd smoker, with no know significant past medical history who presented to the ED at [facility] after being transferred from [facility] with abnormal ECG findings. The patient presented to [facility] earlier today with a 1 year history of exertional chest pain, consistent with typical angina, worse in the last 24 hours. Troponin T at [facility] WNL, hsTroponin ~2 hours later on admission labs WNL. On arrival ECG demonstrated ST segmental elevations in anterolateral leads with Q waves suggestive of prior infarct with ongoing symptoms. ECG did not meet criteria for STEMI. He was given brilenta 180 mg PO, loaded with IV heparin and started on heparin ggt. Patient was consented for cardiac catheterization. Patient to be admitted for management of NSTEMI. Seen by Cardiology    CAD s/p CABG 10/24/2022  Hemodynamic instability  Hypovolemia  Leukocytosis   Post op respiratory insufficiency  Acute blood loss anemia  Thrombocytopenia  Stress hyperglycemia        Plan:   ***Neuro***  [x] Nonfocal   Post operative neuro assessment   continue prn dilaudid/oxy for analgesia with gabapentin and APAP for adjunct therapy      ***Cardiovascular***  Low EF post op: 45%, RV dysfunction   Invasive hemodynamic monitoring, assess perfusion indices   TTE on 10/25 revealed (EF 67%), normal MV, normal RV size and function, normal TV, no pericardial effusion seen. LV thrombus - echo results inconclusive  SR / CVP 4/ MAP 64/ Hct 30.9/ Lactate 1.9  [x] Primacor- 0.3 mcgs   [x] Cardene 5mg/hr for hypertesion  holding BB in setting of inotropic support  c/w Amiodarone for A-fib prophylaxis   Norvasc for radial artery graft   Monitor chest tube outputs   [x] VTE Prophylaxis with heparin gtt  [x] ASA [x] High intensity Statin   Serial EKG and cardiac enzymes   Heparin drip for laminar thrombus      ***Pulmonary***  H/O Smoking at risk for post op pulmonary complication  [x] HFO2- 40L/40%, wean as tolerated for sat > 90%  Encourage incentive spirometry, continue pulse ox monitoring, follow ABGs   Lung expansion maneuvers     ***GI***  [x]  Diet: Tolerating PO consistent carb diet.   [x]  Pepcid  Bowel regimen with Miralax and senna   Reglan for gut motility     ***Renal***  Continue to monitor I/Os, BUN/Creatinine.   Replete lytes PRN  Benjamin present        ***ID***  No active antibiotic coverage      ***Endocrine***  [x] Hyperglycemia  : HbA1c 5.7%              - Stress Hyperglycemia             - [x] ISS             - Need tight glycemic control to prevent wound infection.          Patient requires continuous monitoring with bedside rhythm monitoring, pulse oximetry monitoring, and continuous central venous and arterial pressure monitoring; and intermittent blood gas analysis. Care plan discussed with the ICU care team.   Patient remained critical, at risk for life threatening decompensation.    I have spent 30 minutes providing critical care management to this patient.    By signing my name below, I, Dewayne Tavarez, attest that this documentation has been prepared under the direction and in the presence of RUI Jeter   Electronically signed: Yu Garcia, 10-26-22 @ 09:50    I, RUI Jeter, personally performed the services described in this documentation. all medical record entries made by the scribe were at my direction and in my presence. I have reviewed the chart and agree that the record reflects my personal performance and is accurate and complete  Electronically signed: RUI Jeter  Patient seen and examined at the bedside.    Remained critically ill on continuous ICU monitoring.    OBJECTIVE:  Vital Signs Last 24 Hrs  T(C): 36.2 (26 Oct 2022 04:00), Max: 37 (25 Oct 2022 20:00)  T(F): 97.1 (26 Oct 2022 04:00), Max: 98.6 (25 Oct 2022 20:00)  HR: 76 (26 Oct 2022 09:30) (71 - 97)  BP: 119/63 (26 Oct 2022 03:45) (119/59 - 141/58)  BP(mean): 85 (26 Oct 2022 03:45) (83 - 93)  RR: 19 (26 Oct 2022 09:30) (6 - 35)  SpO2: 94% (26 Oct 2022 09:30) (91% - 100%)    Parameters below as of 26 Oct 2022 04:00  Patient On (Oxygen Delivery Method): nasal cannula, high flow  O2 Flow (L/min): 40  O2 Concentration (%): 40      Physical Exam:   General: OOBTC, NAD, on HiFLo  Neurology: Nonfocal   Eyes: bilateral pupils equal and reactive   ENT/Neck: Neck supple, trachea midline, No JVD . loose tooth-attatched to string for precaution  Respiratory: Rales noted bilaterally, on HFNC   CV: RRR, S1S2, no murmurs        [x] Sternal dressing, [x] Mediastinal CT, [x] L Pleural CT, [x] Leg Bulb        [x] Sinus rhythm, [x] Temporary pacing- VVI @40   Abdominal: Soft, NT, ND +BS,   Extremities: 1-2+ pedal edema noted, + peripheral pulses   Skin: No Rashes, Hematoma, Ecchymosis                             Assessment:  52 year old [place] male, current 0.5 ppd smoker, with no know significant past medical history who presented to the ED at [facility] after being transferred from [facility] with abnormal ECG findings. The patient presented to [facility] earlier today with a 1 year history of exertional chest pain, consistent with typical angina, worse in the last 24 hours. Troponin T at [facility] WNL, hsTroponin ~2 hours later on admission labs WNL. On arrival ECG demonstrated ST segmental elevations in anterolateral leads with Q waves suggestive of prior infarct with ongoing symptoms. ECG did not meet criteria for STEMI. He was given brilenta 180 mg PO, loaded with IV heparin and started on heparin ggt. Patient was consented for cardiac catheterization. Patient to be admitted for management of NSTEMI. Seen by Cardiology    CAD s/p CABG 10/24/2022  Hemodynamic instability  Hypovolemia  Leukocytosis   Post op respiratory insufficiency  Acute blood loss anemia  Thrombocytopenia  Stress hyperglycemia        Plan:   ***Neuro***  [x] Nonfocal   Post operative neuro assessment   continue prn dilaudid/oxy for analgesia with gabapentin and APAP for adjunct therapy      ***Cardiovascular***  Low EF post op: 45%, RV dysfunction   Invasive hemodynamic monitoring, assess perfusion indices   TTE on 10/25 revealed (EF 67%), normal MV, normal RV size and function, normal TV, no pericardial effusion seen. LV thrombus - echo results inconclusive  SR / CVP 4/ MAP 64/ Hct 30.9/ Lactate 1.9  [x] Primacor- 0.3 mcgs, wean as tolerated   [x] Cardene 5mg/hr for hypertesion--> weaned off this AM   holding BB in setting of inotropic support  c/w Amiodarone for A-fib prophylaxis   Norvasc for radial artery graft   Monitor chest tube outputs   [x] VTE Prophylaxis with heparin gtt  [x] ASA [x] High intensity Statin   Serial EKG and cardiac enzymes   Heparin drip for laminar thrombus  Take out Leg HAWK after it drains       ***Pulmonary***  H/O Smoking at risk for post op pulmonary complication  HFO2 weaned off this AM   [x] 4L NC   Encourage incentive spirometry, continue pulse ox monitoring, follow ABGs   Lung expansion maneuvers     ***GI***  [x]  Diet: Tolerating PO consistent carb diet.   [x]  Pepcid  Bowel regimen with Miralax and senna   Reglan for gut motility     ***Renal***  Continue to monitor I/Os, BUN/Creatinine.   Replete lytes PRN  Benjamin present  Dose of Lasix given         ***ID***  No active antibiotic coverage      ***Endocrine***  [x] Hyperglycemia  : HbA1c 5.7%              - Stress Hyperglycemia             - [x] ISS             - Need tight glycemic control to prevent wound infection.          Patient requires continuous monitoring with bedside rhythm monitoring, pulse oximetry monitoring, and continuous central venous and arterial pressure monitoring; and intermittent blood gas analysis. Care plan discussed with the ICU care team.   Patient remained critical, at risk for life threatening decompensation.    I have spent 30 minutes providing critical care management to this patient.    By signing my name below, I, Dewayne Tavarez, attest that this documentation has been prepared under the direction and in the presence of RUI Jeter   Electronically signed: Yu Garcia, 10-26-22 @ 09:50    I, RUI Jeter, personally performed the services described in this documentation. all medical record entries made by the scribe were at my direction and in my presence. I have reviewed the chart and agree that the record reflects my personal performance and is accurate and complete  Electronically signed: RUI Jeter  Patient seen and examined at the bedside.    Remained critically ill on continuous ICU monitoring.    OBJECTIVE:  Vital Signs Last 24 Hrs  T(C): 36.2 (26 Oct 2022 04:00), Max: 37 (25 Oct 2022 20:00)  T(F): 97.1 (26 Oct 2022 04:00), Max: 98.6 (25 Oct 2022 20:00)  HR: 76 (26 Oct 2022 09:30) (71 - 97)  BP: 119/63 (26 Oct 2022 03:45) (119/59 - 141/58)  BP(mean): 85 (26 Oct 2022 03:45) (83 - 93)  RR: 19 (26 Oct 2022 09:30) (6 - 35)  SpO2: 94% (26 Oct 2022 09:30) (91% - 100%)    Parameters below as of 26 Oct 2022 04:00  Patient On (Oxygen Delivery Method): nasal cannula, high flow  O2 Flow (L/min): 40  O2 Concentration (%): 40      Physical Exam:   General: OOBTC, NAD, on regular NC  Neurology: Nonfocal   Eyes: bilateral pupils equal and reactive   ENT/Neck: Neck supple, trachea midline, No JVD  Respiratory: Rales noted bilaterally  CV: RRR, S1S2, no murmurs        [x] Sternal dressing, [x] Mediastinal CT, [x] L Pleural CT, [x] Leg Bulb        [x] Sinus rhythm, [x] Temporary pacing- VVI @40   Abdominal: Soft, NT, ND +BS,   Extremities: 1-2+ pedal edema noted, + peripheral pulses , + R leg at HAWK harvest site  Skin: No Rashes, Hematoma, Ecchymosis                             Assessment:  52 year old [place] male, current 0.5 ppd smoker, with no know significant past medical history who presented to the ED at [facility] after being transferred from [facility] with abnormal ECG findings. The patient presented to [facility] earlier today with a 1 year history of exertional chest pain, consistent with typical angina, worse in the last 24 hours. Troponin T at [facility] WNL, hsTroponin ~2 hours later on admission labs WNL. On arrival ECG demonstrated ST segmental elevations in anterolateral leads with Q waves suggestive of prior infarct with ongoing symptoms. ECG did not meet criteria for STEMI. He was given brilenta 180 mg PO, loaded with IV heparin and started on heparin ggt. Patient was consented for cardiac catheterization. Patient to be admitted for management of NSTEMI. Seen by Cardiology    CAD s/p CABG 10/24/2022  Hemodynamic instability  Hypovolemia  Leukocytosis   Post op respiratory insufficiency  Acute blood loss anemia  Thrombocytopenia  Stress hyperglycemia        Plan:   ***Neuro***  [x] Nonfocal   Post operative neuro assessment   continue prn dilaudid/oxy for analgesia with gabapentin and APAP for adjunct therapy      ***Cardiovascular***  Low EF post op: 45%, RV dysfunction   Invasive hemodynamic monitoring, assess perfusion indices   TTE on 10/25 revealed (EF 67%), normal MV, normal RV size and function, normal TV, no pericardial effusion seen. LV thrombus - echo results inconclusive  SR / CVP 4/ MAP 64/ Hct 30.9/ Lactate 1.9  [x] Primacor- 0.3 mcgs, wean as tolerated   [x] Cardene 5mg/hr for hypertesion--> weaned off this AM   holding BB in setting of inotropic support  c/w Amiodarone for A-fib prophylaxis   Norvasc for radial artery graft   Monitor chest tube outputs   [x] VTE Prophylaxis with heparin gtt  [x] ASA [x] High intensity Statin   Serial EKG and cardiac enzymes   Heparin drip for laminar thrombus  Take out Leg HAWK after it drains       ***Pulmonary***  H/O Smoking at risk for post op pulmonary complication  HFO2 weaned off this AM   [x] 4L NC   Encourage incentive spirometry, continue pulse ox monitoring, follow ABGs   Lung expansion maneuvers     ***GI***  [x]  Diet: Tolerating PO consistent carb diet.   [x]  Pepcid  Bowel regimen with Miralax and senna   Reglan for gut motility     ***Renal***  Continue to monitor I/Os, BUN/Creatinine.   Replete lytes PRN  Benjamin present  Dose of Lasix given         ***ID***  No active antibiotic coverage      ***Endocrine***  [x] Hyperglycemia  : HbA1c 5.7%              - Stress Hyperglycemia             - [x] ISS             - Need tight glycemic control to prevent wound infection.          Patient requires continuous monitoring with bedside rhythm monitoring, pulse oximetry monitoring, and continuous central venous and arterial pressure monitoring; and intermittent blood gas analysis. Care plan discussed with the ICU care team.   Patient remained critical, at risk for life threatening decompensation.    I have spent 35 minutes providing critical care management to this patient.    By signing my name below, I, Dewayne Tavarez, attest that this documentation has been prepared under the direction and in the presence of RUI Jeter   Electronically signed: Yu Garcia, 10-26-22 @ 09:50    I, RUI Jeter, personally performed the services described in this documentation. all medical record entries made by the scribe were at my direction and in my presence. I have reviewed the chart and agree that the record reflects my personal performance and is accurate and complete  Electronically signed: RUI Jeter

## 2022-10-27 LAB
ALBUMIN SERPL ELPH-MCNC: 3.4 G/DL — SIGNIFICANT CHANGE UP (ref 3.3–5)
ALP SERPL-CCNC: 36 U/L — LOW (ref 40–120)
ALT FLD-CCNC: 68 U/L — HIGH (ref 10–45)
ANION GAP SERPL CALC-SCNC: 7 MMOL/L — SIGNIFICANT CHANGE UP (ref 5–17)
APTT BLD: 41.4 SEC — HIGH (ref 27.5–35.5)
APTT BLD: 55 SEC — HIGH (ref 27.5–35.5)
APTT BLD: 59.3 SEC — HIGH (ref 27.5–35.5)
AST SERPL-CCNC: 55 U/L — HIGH (ref 10–40)
BASE EXCESS BLDV CALC-SCNC: 1.8 MMOL/L — SIGNIFICANT CHANGE UP (ref -2–3)
BASE EXCESS BLDV CALC-SCNC: 4.2 MMOL/L — HIGH (ref -2–3)
BILIRUB SERPL-MCNC: 0.6 MG/DL — SIGNIFICANT CHANGE UP (ref 0.2–1.2)
BLD GP AB SCN SERPL QL: NEGATIVE — SIGNIFICANT CHANGE UP
BUN SERPL-MCNC: 25 MG/DL — HIGH (ref 7–23)
CA-I SERPL-SCNC: 1.17 MMOL/L — SIGNIFICANT CHANGE UP (ref 1.15–1.33)
CALCIUM SERPL-MCNC: 8 MG/DL — LOW (ref 8.4–10.5)
CHLORIDE BLDV-SCNC: 102 MMOL/L — SIGNIFICANT CHANGE UP (ref 96–108)
CHLORIDE SERPL-SCNC: 103 MMOL/L — SIGNIFICANT CHANGE UP (ref 96–108)
CO2 BLDV-SCNC: 28 MMOL/L — HIGH (ref 22–26)
CO2 BLDV-SCNC: 31 MMOL/L — HIGH (ref 22–26)
CO2 SERPL-SCNC: 26 MMOL/L — SIGNIFICANT CHANGE UP (ref 22–31)
CREAT SERPL-MCNC: 1.05 MG/DL — SIGNIFICANT CHANGE UP (ref 0.5–1.3)
EGFR: 85 ML/MIN/1.73M2 — SIGNIFICANT CHANGE UP
GAS PNL BLDV: 134 MMOL/L — LOW (ref 136–145)
GAS PNL BLDV: SIGNIFICANT CHANGE UP
GLUCOSE BLDC GLUCOMTR-MCNC: 116 MG/DL — HIGH (ref 70–99)
GLUCOSE BLDC GLUCOMTR-MCNC: 140 MG/DL — HIGH (ref 70–99)
GLUCOSE BLDC GLUCOMTR-MCNC: 141 MG/DL — HIGH (ref 70–99)
GLUCOSE BLDC GLUCOMTR-MCNC: 187 MG/DL — HIGH (ref 70–99)
GLUCOSE BLDV-MCNC: 123 MG/DL — HIGH (ref 70–99)
GLUCOSE SERPL-MCNC: 125 MG/DL — HIGH (ref 70–99)
HCO3 BLDV-SCNC: 27 MMOL/L — SIGNIFICANT CHANGE UP (ref 22–29)
HCO3 BLDV-SCNC: 29 MMOL/L — SIGNIFICANT CHANGE UP (ref 22–29)
HCT VFR BLD CALC: 21.7 % — LOW (ref 39–50)
HCT VFR BLD CALC: 25.3 % — LOW (ref 39–50)
HCT VFR BLDA CALC: 27 % — LOW (ref 39–51)
HGB BLD CALC-MCNC: 9.1 G/DL — LOW (ref 12.6–17.4)
HGB BLD-MCNC: 7.2 G/DL — LOW (ref 13–17)
HGB BLD-MCNC: 8.8 G/DL — LOW (ref 13–17)
HOROWITZ INDEX BLDV+IHG-RTO: 40 — SIGNIFICANT CHANGE UP
HOROWITZ INDEX BLDV+IHG-RTO: 44 — SIGNIFICANT CHANGE UP
LACTATE BLDV-MCNC: 1.1 MMOL/L — SIGNIFICANT CHANGE UP (ref 0.5–2)
LACTATE BLDV-MCNC: 1.8 MMOL/L — SIGNIFICANT CHANGE UP (ref 0.5–2)
MAGNESIUM SERPL-MCNC: 2 MG/DL — SIGNIFICANT CHANGE UP (ref 1.6–2.6)
MCHC RBC-ENTMCNC: 32.1 PG — SIGNIFICANT CHANGE UP (ref 27–34)
MCHC RBC-ENTMCNC: 32.5 PG — SIGNIFICANT CHANGE UP (ref 27–34)
MCHC RBC-ENTMCNC: 33.2 GM/DL — SIGNIFICANT CHANGE UP (ref 32–36)
MCHC RBC-ENTMCNC: 34.8 GM/DL — SIGNIFICANT CHANGE UP (ref 32–36)
MCV RBC AUTO: 93.4 FL — SIGNIFICANT CHANGE UP (ref 80–100)
MCV RBC AUTO: 96.9 FL — SIGNIFICANT CHANGE UP (ref 80–100)
NRBC # BLD: 0 /100 WBCS — SIGNIFICANT CHANGE UP (ref 0–0)
NRBC # BLD: 0 /100 WBCS — SIGNIFICANT CHANGE UP (ref 0–0)
PCO2 BLDV: 42 MMHG — SIGNIFICANT CHANGE UP (ref 42–55)
PCO2 BLDV: 44 MMHG — SIGNIFICANT CHANGE UP (ref 42–55)
PH BLDV: 7.41 — SIGNIFICANT CHANGE UP (ref 7.32–7.43)
PH BLDV: 7.43 — SIGNIFICANT CHANGE UP (ref 7.32–7.43)
PHOSPHATE SERPL-MCNC: 3 MG/DL — SIGNIFICANT CHANGE UP (ref 2.5–4.5)
PLATELET # BLD AUTO: 106 K/UL — LOW (ref 150–400)
PLATELET # BLD AUTO: 118 K/UL — LOW (ref 150–400)
PO2 BLDV: 38 MMHG — SIGNIFICANT CHANGE UP (ref 25–45)
PO2 BLDV: 44 MMHG — SIGNIFICANT CHANGE UP (ref 25–45)
POTASSIUM BLDV-SCNC: 4.3 MMOL/L — SIGNIFICANT CHANGE UP (ref 3.5–5.1)
POTASSIUM BLDV-SCNC: 4.7 MMOL/L — SIGNIFICANT CHANGE UP (ref 3.5–5.1)
POTASSIUM SERPL-MCNC: 4.7 MMOL/L — SIGNIFICANT CHANGE UP (ref 3.5–5.3)
POTASSIUM SERPL-SCNC: 4.7 MMOL/L — SIGNIFICANT CHANGE UP (ref 3.5–5.3)
PROT SERPL-MCNC: 5.7 G/DL — LOW (ref 6–8.3)
RBC # BLD: 2.24 M/UL — LOW (ref 4.2–5.8)
RBC # BLD: 2.71 M/UL — LOW (ref 4.2–5.8)
RBC # FLD: 12.3 % — SIGNIFICANT CHANGE UP (ref 10.3–14.5)
RBC # FLD: 12.6 % — SIGNIFICANT CHANGE UP (ref 10.3–14.5)
RH IG SCN BLD-IMP: POSITIVE — SIGNIFICANT CHANGE UP
SAO2 % BLDV: 62.8 % — LOW (ref 67–88)
SAO2 % BLDV: 74.6 % — SIGNIFICANT CHANGE UP (ref 67–88)
SODIUM SERPL-SCNC: 136 MMOL/L — SIGNIFICANT CHANGE UP (ref 135–145)
WBC # BLD: 11.07 K/UL — HIGH (ref 3.8–10.5)
WBC # BLD: 14.3 K/UL — HIGH (ref 3.8–10.5)
WBC # FLD AUTO: 11.07 K/UL — HIGH (ref 3.8–10.5)
WBC # FLD AUTO: 14.3 K/UL — HIGH (ref 3.8–10.5)

## 2022-10-27 PROCEDURE — 71045 X-RAY EXAM CHEST 1 VIEW: CPT | Mod: 26

## 2022-10-27 PROCEDURE — 99291 CRITICAL CARE FIRST HOUR: CPT | Mod: 24

## 2022-10-27 RX ORDER — ALBUMIN HUMAN 25 %
250 VIAL (ML) INTRAVENOUS ONCE
Refills: 0 | Status: COMPLETED | OUTPATIENT
Start: 2022-10-27 | End: 2022-10-27

## 2022-10-27 RX ADMIN — Medication 650 MILLIGRAM(S): at 12:07

## 2022-10-27 RX ADMIN — AMIODARONE HYDROCHLORIDE 400 MILLIGRAM(S): 400 TABLET ORAL at 05:06

## 2022-10-27 RX ADMIN — GABAPENTIN 100 MILLIGRAM(S): 400 CAPSULE ORAL at 05:04

## 2022-10-27 RX ADMIN — GABAPENTIN 100 MILLIGRAM(S): 400 CAPSULE ORAL at 21:59

## 2022-10-27 RX ADMIN — Medication 2: at 17:56

## 2022-10-27 RX ADMIN — FAMOTIDINE 20 MILLIGRAM(S): 10 INJECTION INTRAVENOUS at 05:04

## 2022-10-27 RX ADMIN — FAMOTIDINE 20 MILLIGRAM(S): 10 INJECTION INTRAVENOUS at 18:18

## 2022-10-27 RX ADMIN — Medication 10 MILLIGRAM(S): at 13:30

## 2022-10-27 RX ADMIN — Medication 500 MILLIGRAM(S): at 18:19

## 2022-10-27 RX ADMIN — ATORVASTATIN CALCIUM 80 MILLIGRAM(S): 80 TABLET, FILM COATED ORAL at 21:59

## 2022-10-27 RX ADMIN — Medication 500 MILLIGRAM(S): at 05:04

## 2022-10-27 RX ADMIN — Medication 650 MILLIGRAM(S): at 05:35

## 2022-10-27 RX ADMIN — Medication 10 MILLIGRAM(S): at 21:59

## 2022-10-27 RX ADMIN — CHLORHEXIDINE GLUCONATE 1 APPLICATION(S): 213 SOLUTION TOPICAL at 12:11

## 2022-10-27 RX ADMIN — GABAPENTIN 100 MILLIGRAM(S): 400 CAPSULE ORAL at 13:30

## 2022-10-27 RX ADMIN — HEPARIN SODIUM 10 UNIT(S)/HR: 5000 INJECTION INTRAVENOUS; SUBCUTANEOUS at 22:16

## 2022-10-27 RX ADMIN — AMLODIPINE BESYLATE 5 MILLIGRAM(S): 2.5 TABLET ORAL at 05:35

## 2022-10-27 RX ADMIN — Medication 81 MILLIGRAM(S): at 12:07

## 2022-10-27 RX ADMIN — Medication 125 MILLILITER(S): at 18:18

## 2022-10-27 RX ADMIN — Medication 10 MILLIGRAM(S): at 05:06

## 2022-10-27 RX ADMIN — Medication 650 MILLIGRAM(S): at 12:53

## 2022-10-27 NOTE — PROGRESS NOTE ADULT - SUBJECTIVE AND OBJECTIVE BOX
· Subjective and Objective:   Patient seen and examined at the bedside.    Remained critically ill on continuous ICU monitoring.    OBJECTIVE:  Vital Signs Last 24 Hrs  T(C): 36.2 (26 Oct 2022 04:00), Max: 37 (25 Oct 2022 20:00)  T(F): 97.1 (26 Oct 2022 04:00), Max: 98.6 (25 Oct 2022 20:00)  HR: 76 (26 Oct 2022 09:30) (71 - 97)  BP: 119/63 (26 Oct 2022 03:45) (119/59 - 141/58)  BP(mean): 85 (26 Oct 2022 03:45) (83 - 93)  RR: 19 (26 Oct 2022 09:30) (6 - 35)  SpO2: 94% (26 Oct 2022 09:30) (91% - 100%)    Parameters below as of 26 Oct 2022 04:00  Patient On (Oxygen Delivery Method): nasal cannula, high flow  O2 Flow (L/min): 40  O2 Concentration (%): 40      Physical Exam:   General: OOBTC, NAD, on regular NC  Neurology: Nonfocal   Eyes: bilateral pupils equal and reactive   ENT/Neck: Neck supple, trachea midline, No JVD  Respiratory: Rales noted bilaterally  CV: RRR, S1S2, no murmurs        [x] Sternal dressing, [x] Mediastinal CT, [x] L Pleural CT, [x] Leg Bulb        [x] Sinus rhythm, [x] Temporary pacing- VVI @40   Abdominal: Soft, NT, ND +BS,   Extremities: 1-2+ pedal edema noted, + peripheral pulses , + R leg at HAWK harvest site  Skin: No Rashes, Hematoma, Ecchymosis                             Assessment:  52 year old Slovak male, current 0.5 ppd smoker, with no know significant past medical history who presented to the ED at [facility] after being transferred from [facility] with abnormal ECG findings. The patient presented to [facility] earlier today with a 1 year history of exertional chest pain, consistent with typical angina, worse in the last 24 hours. Troponin T at [facility] WNL, hsTroponin ~2 hours later on admission labs WNL. On arrival ECG demonstrated ST segmental elevations in anterolateral leads with Q waves suggestive of prior infarct with ongoing symptoms. ECG did not meet criteria for STEMI. He was given brilenta 180 mg PO, loaded with IV heparin and started on heparin ggt. Patient was consented for cardiac catheterization. Patient to be admitted for management of NSTEMI. Seen by Cardiology    CAD s/p CABG 10/24/2022  Hemodynamic instability  Hypovolemia  Leukocytosis   Post op respiratory insufficiency  Acute blood loss anemia  Thrombocytopenia  Stress hyperglycemia        Plan:   ***Neuro***  [x] Nonfocal   Post operative neuro assessment   continue prn dilaudid/oxy for analgesia with gabapentin and APAP for adjunct therapy      ***Cardiovascular***  Low EF post op: 45%, RV dysfunction   Invasive hemodynamic monitoring, assess perfusion indices   TTE on 10/25 revealed (EF 67%), normal MV, normal RV size and function, normal TV, no pericardial effusion seen. LV thrombus - echo results inconclusive  SR / CVP 4/ MAP 64/ Hct 30.9/ Lactate 1.9  [x] Primacor- 0.3 mcgs, wean as tolerated   [x] Cardene 5mg/hr for hypertesion--> weaned off this AM   holding BB in setting of inotropic support  c/w Amiodarone for A-fib prophylaxis   Norvasc for radial artery graft   Monitor chest tube outputs   [x] VTE Prophylaxis with heparin gtt  [x] ASA [x] High intensity Statin   Serial EKG and cardiac enzymes   Heparin drip for laminar thrombus  Take out Leg HAWK after it drains       ***Pulmonary***  H/O Smoking at risk for post op pulmonary complication  HFO2 weaned off this AM   [x] 4L NC   Encourage incentive spirometry, continue pulse ox monitoring, follow ABGs   Lung expansion maneuvers     ***GI***  [x]  Diet: Tolerating PO consistent carb diet.   [x]  Pepcid  Bowel regimen with Miralax and senna   Reglan for gut motility     ***Renal***  Continue to monitor I/Os, BUN/Creatinine.   Replete lytes PRN  Benjamin present  Dose of Lasix given         ***ID***  No active antibiotic coverage      ***Endocrine***  [x] Hyperglycemia  : HbA1c 5.7%              - Stress Hyperglycemia             - [x] ISS             - Need tight glycemic control to prevent wound infection.     · Subjective and Objective:   Patient seen and examined at the bedside.    Remained critically ill on continuous ICU monitoring.    OBJECTIVE:  Vital Signs Last 24 Hrs  T(C): 36.2 (26 Oct 2022 04:00), Max: 37 (25 Oct 2022 20:00)  T(F): 97.1 (26 Oct 2022 04:00), Max: 98.6 (25 Oct 2022 20:00)  HR: 76 (26 Oct 2022 09:30) (71 - 97)  BP: 119/63 (26 Oct 2022 03:45) (119/59 - 141/58)  BP(mean): 85 (26 Oct 2022 03:45) (83 - 93)  RR: 19 (26 Oct 2022 09:30) (6 - 35)  SpO2: 94% (26 Oct 2022 09:30) (91% - 100%)    Parameters below as of 26 Oct 2022 04:00  Patient On (Oxygen Delivery Method): nasal cannula, high flow  O2 Flow (L/min): 40  O2 Concentration (%): 40      Physical Exam:   General: OOBTC, NAD, on regular NC  Neurology: Nonfocal   Eyes: bilateral pupils equal and reactive   ENT/Neck: Neck supple, trachea midline, No JVD  Respiratory: Rales noted bilaterally  CV: RRR, S1S2, no murmurs        [x] Sternal dressing, [x] Mediastinal CT, [x] L Pleural CT, [x] Leg Bulb        [x] Sinus rhythm, [x] Temporary pacing- VVI @40   Abdominal: Soft, NT, ND +BS,   Extremities: 1-2+ pedal edema noted, + peripheral pulses , + R leg at HAWK harvest site  Skin: No Rashes, Hematoma, Ecchymosis                             Assessment:  52 year old French male, current 0.5 ppd smoker, with no know significant past medical history who presented to the ED at [facility] after being transferred from [facility] with abnormal ECG findings. The patient presented to [facility] earlier today with a 1 year history of exertional chest pain, consistent with typical angina, worse in the last 24 hours. Troponin T at [facility] WNL, hsTroponin ~2 hours later on admission labs WNL. On arrival ECG demonstrated ST segmental elevations in anterolateral leads with Q waves suggestive of prior infarct with ongoing symptoms. ECG did not meet criteria for STEMI. He was given brilenta 180 mg PO, loaded with IV heparin and started on heparin ggt. Patient was consented for cardiac catheterization. Patient to be admitted for management of NSTEMI. Seen by Cardiology    CAD s/p CABG 10/24/2022  Hemodynamic instability  Hypovolemia  Leukocytosis   Post op respiratory insufficiency  Acute blood loss anemia  Thrombocytopenia  Stress hyperglycemia        Plan:   ***Neuro***  [x] Nonfocal   Post operative neuro assessment   continue prn dilaudid/oxy for analgesia with gabapentin and APAP for adjunct therapy      ***Cardiovascular***  Low EF post op: 45%, RV dysfunction  EPM VVI 40/10  Invasive hemodynamic monitoring, assess perfusion indices   TTE on 10/25 revealed (EF 67%), normal MV, normal RV size and function, normal TV, no pericardial effusion seen. LV thrombus - echo results inconclusive  SR / CVP 4/ MAP 64/ Hct 30.9/ Lactate 1.9  [x] Primacor- 0.1 mcgs, wean as tolerated   holding BB in setting of inotropic support  c/w Amiodarone for A-fib prophylaxis   Norvasc for radial artery graft   Monitor chest tube outputs   [x] VTE Prophylaxis with heparin gtt  [x] ASA [x] High intensity Statin   Serial EKG and cardiac enzymes   Heparin drip for LV thrombus  Dc Leg Hawk when drainage is decreased      ***Pulmonary***  H/O Smoking at risk for post op pulmonary complication  [x] 5L NC O2 sat 100%  Encourage incentive spirometry, continue pulse ox monitoring, follow ABGs   Lung expansion maneuvers     ***GI***  [x]  Diet: Tolerating PO consistent carb diet.   [x]  Pepcid  Bowel regimen with Miralax and senna   Reglan for gut motility     ***Renal***  Continue to monitor I/Os, BUN/Creatinine.   Replete lytes PRN  Dc nolen        ***ID***  No active antibiotic coverage      ***Endocrine***  [x] Hyperglycemia  : HbA1c 5.7%              - Stress Hyperglycemia             - [x] ISS             - Need tight glycemic control to prevent wound infection.

## 2022-10-28 LAB
ALBUMIN SERPL ELPH-MCNC: 3.2 G/DL — LOW (ref 3.3–5)
ALP SERPL-CCNC: 33 U/L — LOW (ref 40–120)
ALT FLD-CCNC: 59 U/L — HIGH (ref 10–45)
ANION GAP SERPL CALC-SCNC: 8 MMOL/L — SIGNIFICANT CHANGE UP (ref 5–17)
APTT BLD: 44.1 SEC — HIGH (ref 27.5–35.5)
APTT BLD: 46.9 SEC — HIGH (ref 27.5–35.5)
APTT BLD: 48.8 SEC — HIGH (ref 27.5–35.5)
APTT BLD: 52.8 SEC — HIGH (ref 27.5–35.5)
AST SERPL-CCNC: 43 U/L — HIGH (ref 10–40)
BASE EXCESS BLDV CALC-SCNC: 1.9 MMOL/L — SIGNIFICANT CHANGE UP (ref -2–3)
BASOPHILS # BLD AUTO: 0.02 K/UL — SIGNIFICANT CHANGE UP (ref 0–0.2)
BASOPHILS NFR BLD AUTO: 0.2 % — SIGNIFICANT CHANGE UP (ref 0–2)
BILIRUB SERPL-MCNC: 1.2 MG/DL — SIGNIFICANT CHANGE UP (ref 0.2–1.2)
BUN SERPL-MCNC: 23 MG/DL — SIGNIFICANT CHANGE UP (ref 7–23)
CA-I SERPL-SCNC: 1.22 MMOL/L — SIGNIFICANT CHANGE UP (ref 1.15–1.33)
CALCIUM SERPL-MCNC: 7.9 MG/DL — LOW (ref 8.4–10.5)
CHLORIDE BLDV-SCNC: 104 MMOL/L — SIGNIFICANT CHANGE UP (ref 96–108)
CHLORIDE SERPL-SCNC: 104 MMOL/L — SIGNIFICANT CHANGE UP (ref 96–108)
CO2 BLDV-SCNC: 29 MMOL/L — HIGH (ref 22–26)
CO2 SERPL-SCNC: 25 MMOL/L — SIGNIFICANT CHANGE UP (ref 22–31)
CREAT SERPL-MCNC: 1.06 MG/DL — SIGNIFICANT CHANGE UP (ref 0.5–1.3)
EGFR: 84 ML/MIN/1.73M2 — SIGNIFICANT CHANGE UP
EOSINOPHIL # BLD AUTO: 0.06 K/UL — SIGNIFICANT CHANGE UP (ref 0–0.5)
EOSINOPHIL NFR BLD AUTO: 0.5 % — SIGNIFICANT CHANGE UP (ref 0–6)
GAS PNL BLDV: 133 MMOL/L — LOW (ref 136–145)
GAS PNL BLDV: SIGNIFICANT CHANGE UP
GAS PNL BLDV: SIGNIFICANT CHANGE UP
GLUCOSE BLDC GLUCOMTR-MCNC: 113 MG/DL — HIGH (ref 70–99)
GLUCOSE BLDC GLUCOMTR-MCNC: 118 MG/DL — HIGH (ref 70–99)
GLUCOSE BLDC GLUCOMTR-MCNC: 162 MG/DL — HIGH (ref 70–99)
GLUCOSE BLDC GLUCOMTR-MCNC: 98 MG/DL — SIGNIFICANT CHANGE UP (ref 70–99)
GLUCOSE BLDV-MCNC: 117 MG/DL — HIGH (ref 70–99)
GLUCOSE SERPL-MCNC: 109 MG/DL — HIGH (ref 70–99)
HCO3 BLDV-SCNC: 27 MMOL/L — SIGNIFICANT CHANGE UP (ref 22–29)
HCT VFR BLD CALC: 24.4 % — LOW (ref 39–50)
HCT VFR BLDA CALC: 25 % — LOW (ref 39–51)
HGB BLD CALC-MCNC: 8.4 G/DL — LOW (ref 12.6–17.4)
HGB BLD-MCNC: 8.3 G/DL — LOW (ref 13–17)
IMM GRANULOCYTES NFR BLD AUTO: 0.7 % — SIGNIFICANT CHANGE UP (ref 0–0.9)
INR BLD: 1.03 RATIO — SIGNIFICANT CHANGE UP (ref 0.88–1.16)
LACTATE BLDV-MCNC: 0.5 MMOL/L — SIGNIFICANT CHANGE UP (ref 0.5–2)
LYMPHOCYTES # BLD AUTO: 2.8 K/UL — SIGNIFICANT CHANGE UP (ref 1–3.3)
LYMPHOCYTES # BLD AUTO: 24.8 % — SIGNIFICANT CHANGE UP (ref 13–44)
MAGNESIUM SERPL-MCNC: 2 MG/DL — SIGNIFICANT CHANGE UP (ref 1.6–2.6)
MCHC RBC-ENTMCNC: 32.3 PG — SIGNIFICANT CHANGE UP (ref 27–34)
MCHC RBC-ENTMCNC: 34 GM/DL — SIGNIFICANT CHANGE UP (ref 32–36)
MCV RBC AUTO: 94.9 FL — SIGNIFICANT CHANGE UP (ref 80–100)
MONOCYTES # BLD AUTO: 1.18 K/UL — HIGH (ref 0–0.9)
MONOCYTES NFR BLD AUTO: 10.4 % — SIGNIFICANT CHANGE UP (ref 2–14)
NEUTROPHILS # BLD AUTO: 7.17 K/UL — SIGNIFICANT CHANGE UP (ref 1.8–7.4)
NEUTROPHILS NFR BLD AUTO: 63.4 % — SIGNIFICANT CHANGE UP (ref 43–77)
NRBC # BLD: 0 /100 WBCS — SIGNIFICANT CHANGE UP (ref 0–0)
PCO2 BLDV: 45 MMHG — SIGNIFICANT CHANGE UP (ref 42–55)
PH BLDV: 7.39 — SIGNIFICANT CHANGE UP (ref 7.32–7.43)
PHOSPHATE SERPL-MCNC: 3.3 MG/DL — SIGNIFICANT CHANGE UP (ref 2.5–4.5)
PLATELET # BLD AUTO: 108 K/UL — LOW (ref 150–400)
PO2 BLDV: 45 MMHG — SIGNIFICANT CHANGE UP (ref 25–45)
POTASSIUM BLDV-SCNC: 4.5 MMOL/L — SIGNIFICANT CHANGE UP (ref 3.5–5.1)
POTASSIUM SERPL-MCNC: 4.4 MMOL/L — SIGNIFICANT CHANGE UP (ref 3.5–5.3)
POTASSIUM SERPL-SCNC: 4.4 MMOL/L — SIGNIFICANT CHANGE UP (ref 3.5–5.3)
PROT SERPL-MCNC: 5.4 G/DL — LOW (ref 6–8.3)
PROTHROM AB SERPL-ACNC: 11.9 SEC — SIGNIFICANT CHANGE UP (ref 10.5–13.4)
RBC # BLD: 2.57 M/UL — LOW (ref 4.2–5.8)
RBC # FLD: 12.8 % — SIGNIFICANT CHANGE UP (ref 10.3–14.5)
SAO2 % BLDV: 74.2 % — SIGNIFICANT CHANGE UP (ref 67–88)
SODIUM SERPL-SCNC: 137 MMOL/L — SIGNIFICANT CHANGE UP (ref 135–145)
WBC # BLD: 11.31 K/UL — HIGH (ref 3.8–10.5)
WBC # FLD AUTO: 11.31 K/UL — HIGH (ref 3.8–10.5)

## 2022-10-28 PROCEDURE — 99291 CRITICAL CARE FIRST HOUR: CPT | Mod: 24

## 2022-10-28 PROCEDURE — 71045 X-RAY EXAM CHEST 1 VIEW: CPT | Mod: 26

## 2022-10-28 RX ORDER — SODIUM CHLORIDE 9 MG/ML
3 INJECTION INTRAMUSCULAR; INTRAVENOUS; SUBCUTANEOUS EVERY 8 HOURS
Refills: 0 | Status: DISCONTINUED | OUTPATIENT
Start: 2022-10-28 | End: 2022-10-29

## 2022-10-28 RX ORDER — FAMOTIDINE 10 MG/ML
20 INJECTION INTRAVENOUS
Refills: 0 | Status: DISCONTINUED | OUTPATIENT
Start: 2022-10-28 | End: 2022-10-28

## 2022-10-28 RX ORDER — SORBITOL SOLUTION 70 %
30 SOLUTION, ORAL MISCELLANEOUS ONCE
Refills: 0 | Status: COMPLETED | OUTPATIENT
Start: 2022-10-28 | End: 2022-10-28

## 2022-10-28 RX ORDER — HEPARIN SODIUM 5000 [USP'U]/ML
1050 INJECTION INTRAVENOUS; SUBCUTANEOUS
Qty: 25000 | Refills: 0 | Status: DISCONTINUED | OUTPATIENT
Start: 2022-10-28 | End: 2022-10-29

## 2022-10-28 RX ORDER — METOPROLOL TARTRATE 50 MG
12.5 TABLET ORAL EVERY 12 HOURS
Refills: 0 | Status: DISCONTINUED | OUTPATIENT
Start: 2022-10-28 | End: 2022-10-29

## 2022-10-28 RX ADMIN — GABAPENTIN 100 MILLIGRAM(S): 400 CAPSULE ORAL at 05:14

## 2022-10-28 RX ADMIN — HEPARIN SODIUM 10.5 UNIT(S)/HR: 5000 INJECTION INTRAVENOUS; SUBCUTANEOUS at 01:37

## 2022-10-28 RX ADMIN — SODIUM CHLORIDE 3 MILLILITER(S): 9 INJECTION INTRAMUSCULAR; INTRAVENOUS; SUBCUTANEOUS at 21:54

## 2022-10-28 RX ADMIN — HEPARIN SODIUM 10.5 UNIT(S)/HR: 5000 INJECTION INTRAVENOUS; SUBCUTANEOUS at 10:45

## 2022-10-28 RX ADMIN — CHLORHEXIDINE GLUCONATE 1 APPLICATION(S): 213 SOLUTION TOPICAL at 10:44

## 2022-10-28 RX ADMIN — SODIUM CHLORIDE 3 MILLILITER(S): 9 INJECTION INTRAMUSCULAR; INTRAVENOUS; SUBCUTANEOUS at 14:14

## 2022-10-28 RX ADMIN — SENNA PLUS 2 TABLET(S): 8.6 TABLET ORAL at 21:31

## 2022-10-28 RX ADMIN — Medication 12.5 MILLIGRAM(S): at 06:03

## 2022-10-28 RX ADMIN — POLYETHYLENE GLYCOL 3350 17 GRAM(S): 17 POWDER, FOR SOLUTION ORAL at 10:26

## 2022-10-28 RX ADMIN — Medication 81 MILLIGRAM(S): at 10:26

## 2022-10-28 RX ADMIN — FAMOTIDINE 20 MILLIGRAM(S): 10 INJECTION INTRAVENOUS at 18:11

## 2022-10-28 RX ADMIN — Medication 2: at 17:01

## 2022-10-28 RX ADMIN — Medication 500 MILLIGRAM(S): at 18:11

## 2022-10-28 RX ADMIN — GABAPENTIN 100 MILLIGRAM(S): 400 CAPSULE ORAL at 21:32

## 2022-10-28 RX ADMIN — FAMOTIDINE 20 MILLIGRAM(S): 10 INJECTION INTRAVENOUS at 05:14

## 2022-10-28 RX ADMIN — AMLODIPINE BESYLATE 5 MILLIGRAM(S): 2.5 TABLET ORAL at 05:14

## 2022-10-28 RX ADMIN — Medication 500 MILLIGRAM(S): at 05:14

## 2022-10-28 RX ADMIN — GABAPENTIN 100 MILLIGRAM(S): 400 CAPSULE ORAL at 14:32

## 2022-10-28 RX ADMIN — Medication 12.5 MILLIGRAM(S): at 18:11

## 2022-10-28 RX ADMIN — ATORVASTATIN CALCIUM 80 MILLIGRAM(S): 80 TABLET, FILM COATED ORAL at 21:31

## 2022-10-28 RX ADMIN — HEPARIN SODIUM 11 UNIT(S)/HR: 5000 INJECTION INTRAVENOUS; SUBCUTANEOUS at 15:47

## 2022-10-28 NOTE — PROGRESS NOTE ADULT - PROBLEM SELECTOR PLAN 1
-POD 4 today from CABG  -All wires and tubes have been removed   - Pt needs BM. No BM since 10/22 Sorbital ordered  -Ambulate/Incentive spirometry  - Will need to change HEparin gtt to oral form. GOal 50-55(narrow window 2/2 bleed from HAWK site)  On Heparin for LV thrombus.  -Likely weekend discharge is H+H remains stable(s/p PRBC 1 unit 10/27) and he is transitioned off heparin gtt

## 2022-10-28 NOTE — PROGRESS NOTE ADULT - SUBJECTIVE AND OBJECTIVE BOX
VITAL SIGNS    Telemetry:    Vital Signs Last 24 Hrs  T(C): 36.9 (10-28-22 @ 09:00), Max: 37.2 (10-27-22 @ 19:15)  T(F): 98.4 (10-28-22 @ 09:00), Max: 99 (10-27-22 @ 19:15)  HR: 83 (10-28-22 @ 09:00) (69 - 86)  BP: 99/62 (10-28-22 @ 09:00) (98/55 - 122/63)  RR: 20 (10-28-22 @ 09:00) (11 - 28)  SpO2: 99% (10-28-22 @ 09:00) (98% - 100%)            10-27 @ :01  -  10-28 @ 07:00  --------------------------------------------------------  IN: 1618.1 mL / OUT: 1830 mL / NET: -211.9 mL    10-28 @ 07:01  -  10-28 @ 10:32  --------------------------------------------------------  IN: 20.5 mL / OUT: 550 mL / NET: -529.5 mL       Daily     Daily Weight in k.7 (28 Oct 2022 00:00)  Admit Wt: Drug Dosing Weight  Height (cm): 162.6 (23 Oct 2022 19:02)  Weight (kg): 68.7 (23 Oct 2022 19:02)  BMI (kg/m2): 26 (23 Oct 2022 19:02)  BSA (m2): 1.74 (23 Oct 2022 19:02)     Daily Weight in k.7 (10-28-22 @ 00:00)    [facility]  10-28    137  |  104  |  23  ----------------------------<  109<H>  4.4   |  25  |  1.06    Ca    7.9<L>      28 Oct 2022 00:33  Phos  3.3     10-28  Mg     2.0     10-28    TPro  5.4<L>  /  Alb  3.2<L>  /  TBili  1.2  /  DBili  x   /  AST  43<H>  /  ALT  59<H>  /  AlkPhos  33<L>  10-28                                 8.3    11.31 )-----------( 108      ( 28 Oct 2022 00:37 )             24.4          PTT - ( 28 Oct 2022 08:09 )  PTT:52.8 sec        Bilirubin Total, Serum: 1.2 mg/dL (10-28 @ 00:33)    CAPILLARY BLOOD GLUCOSE  98 (28 Oct 2022 07:00)      POCT Blood Glucose.: 98 mg/dL (28 Oct 2022 06:09)  POCT Blood Glucose.: 141 mg/dL (27 Oct 2022 21:57)  POCT Blood Glucose.: 187 mg/dL (27 Oct 2022 17:17)  POCT Blood Glucose.: 116 mg/dL (27 Oct 2022 10:50)          Drains:     MS       [  ]   [  ]            L Pleural [  ]            R Pleural  [  ]            HAWK  [  ]           Benjamin  [  ]    Pacing Wires      [  ]   Settings:                                  Isolated  [  ]                    CXR:      MEDICATIONS  acetaminophen     Tablet .. 650 milliGRAM(s) Oral every 6 hours PRN  amLODIPine   Tablet 5 milliGRAM(s) Oral daily  ascorbic acid 500 milliGRAM(s) Oral two times a day  aspirin enteric coated 81 milliGRAM(s) Oral daily  atorvastatin 80 milliGRAM(s) Oral at bedtime  bisacodyl Suppository 10 milliGRAM(s) Rectal once  chlorhexidine 2% Cloths 1 Application(s) Topical daily  famotidine    Tablet 20 milliGRAM(s) Oral two times a day  gabapentin 100 milliGRAM(s) Oral every 8 hours  heparin  Infusion 1050 Unit(s)/Hr IV Continuous <Continuous>  insulin lispro (ADMELOG) corrective regimen sliding scale   SubCutaneous three times a day before meals  insulin lispro (ADMELOG) corrective regimen sliding scale   SubCutaneous at bedtime  metoprolol tartrate 12.5 milliGRAM(s) Oral every 12 hours  oxyCODONE    IR 5 milliGRAM(s) Oral every 4 hours PRN  oxyCODONE    IR 10 milliGRAM(s) Oral every 4 hours PRN  polyethylene glycol 3350 17 Gram(s) Oral daily  senna 2 Tablet(s) Oral at bedtime  sodium chloride 0.9% lock flush 3 milliLiter(s) IV Push every 8 hours  sodium chloride 0.9%. 1000 milliLiter(s) IV Continuous <Continuous>      PHYSICAL EXAM      Neurology: alert and oriented x 3, nonfocal, no gross deficits  CV :S1S2  Sternal Wound :  CDI , Stable  Lungs: cta  Abdomen: soft, nontender, nondistended, positive bowel sounds, last bowel movement   :    voids   Extremities:  Right SVG wrapped in ace, dressing CDI +b/l Pedal pulses                PAST MEDICAL & SURGICAL HISTORY:                 Discussed with Cardiothoracic Team at AM rounds.

## 2022-10-28 NOTE — PROGRESS NOTE ADULT - ASSESSMENT
52 year old Georgian male, current 0.5 ppd smoker, with no know significant past medical history who presented to the ED at [facility] after being transferred from [facility] with abnormal ECG findings. At [facility] s/p cardiac cath with severe 3 vessel disease and is now S/P CABG 3 on 10/24. Post op recovery included inotropic requirement, LV thrombus for which he is on a Heparin gtt, bleeding from SVG site post HAWK pull requiring 1 unit PRBC on 10/27.  10/28-Transferred to [address] [place] [address]., VSS SR on monitor, on Heparin gtt with SVG graft wrapped.

## 2022-10-28 NOTE — PROGRESS NOTE ADULT - SUBJECTIVE AND OBJECTIVE BOX
Patient seen and examined at the bedside.    Remained critically ill on continuous ICU monitoring.    OBJECTIVE:  Vital Signs Last 24 Hrs  T(C): 36.3 (28 Oct 2022 08:00), Max: 37.2 (27 Oct 2022 19:15)  T(F): 97.3 (28 Oct 2022 08:00), Max: 99 (27 Oct 2022 19:15)  HR: 78 (28 Oct 2022 08:00) (69 - 90)  BP: 106/61 (28 Oct 2022 08:00) (98/55 - 122/63)  BP(mean): 78 (28 Oct 2022 08:00) (71 - 90)  RR: 27 (28 Oct 2022 08:00) (11 - 28)  SpO2: 99% (28 Oct 2022 08:00) (98% - 100%)    Parameters below as of 28 Oct 2022 08:00  Patient On (Oxygen Delivery Method): nasal cannula  O2 Flow (L/min): 3  O2 Concentration (%): 32    REVIEW OF SYSTEMS:  RES: no SOB  CARD: no chest pain     *if unable to obtain* [x ] N/A    Physical Exam:  General: NAD, OOB to chair  Neurology: A&Ox3, nonfocal, KO x 4  Eyes: PERRLA/ EOMI, Gross vision intact  ENT/Neck: Neck supple, trachea midline, No JVD, Gross hearing intact  Respiratory: No wheezing, rhonchi, remains on Nasal canula 3 LPM  CV: RRR, S1S2, no murmurs, rubs or gallops        [X] Sternal dressing, [] Mediastinal CT, [] Pleural CT, [] HAWK drain        [X] Sinus rhythm, [] Afib, [] Temporary pacing, [] PPM  Abdominal: Soft, NT, ND +BS,   Extremities: 1-2+ pedal edema noted, + peripheral pulses  Skin: No Rashes, Hematoma, Ecchymosis         Assessment:  52 year old Thai male, current 0.5 ppd smoker, with no know significant past medical history who presented to the ED at [facility] after being transferred from [facility] with abnormal ECG findings. The patient presented to [facility] earlier today with a 1 year history of exertional chest pain, consistent with typical angina, worse in the last 24 hours. Troponin T at [facility] WNL, hsTroponin ~2 hours later on admission labs WNL. On arrival ECG demonstrated ST segmental elevations in anterolateral leads with Q waves suggestive of prior infarct with ongoing symptoms. ECG did not meet criteria for STEMI. He was given brilenta 180 mg PO, loaded with IV heparin and started on heparin ggt. Patient was consented for cardiac catheterization. Patient to be admitted for management of NSTEMI. Seen by Cardiology    CAD s/p CABG 10/24/2022  Hemodynamic instability  Hypovolemia  Leukocytosis   Post op respiratory insufficiency  Acute blood loss anemia  Thrombocytopenia  Stress hyperglycemia    Plan:   ***Neuro***  [x] Nonfocal   Post operative neuro assessment   continue prn dilaudid/ oxy for analgesia with gabapentin and APAP for adjunct therapy      ***Cardiovascular***  Low EF post op: 45%, RV dysfunction, EPM VVI 40/10  Invasive hemodynamic monitoring, assess perfusion indices   TTE on 10/25 revealed (EF 67%), normal MV, normal RV size and function, normal TV, no pericardial effusion seen.   LV thrombus - echo results inconclusive  SR / CVP 4/ MAP 78/ Hct 24.4/ Lactate 0.5  c/w metoprolol 12.5 mg bid  c/w Amiodarone for A-fib prophylaxis   Norvasc for radial artery graft   [x] VTE Prophylaxis with heparin gtt  [x] ASA [x] High intensity Statin   Serial EKG and cardiac enzymes   Heparin drip for LV thrombus    ***Pulmonary***    H/O Smoking at risk for post op pulmonary complication  [x] 3L NC O2 sat 100%  Encourage incentive spirometry, continue pulse ox monitoring, follow ABGs   Lung expansion maneuvers     ***GI***  [x]  Diet: Tolerating PO consistent carb diet.   [x]  Pepcid  Bowel regimen with Miralax and senna   Reglan for gut motility       ***Renal***  Continue to monitor I/Os, BUN/Creatinine.   Replete lytes PRN.   Benjamin present [X ] negative [ ] positive     ***ID***  No active antibiotic coverage        ***Endocrine***  [x] Hyperglycemia  : HbA1c 5.7%              - Stress Hyperglycemia             - [x] ISS     - Need tight glycemic control to prevent wound infection.        ALL MEDICATIONS (delete after use)  MEDICATIONS  (STANDING):  amLODIPine   Tablet 5 milliGRAM(s) Oral daily  ascorbic acid 500 milliGRAM(s) Oral two times a day  aspirin enteric coated 81 milliGRAM(s) Oral daily  atorvastatin 80 milliGRAM(s) Oral at bedtime  bisacodyl Suppository 10 milliGRAM(s) Rectal once  chlorhexidine 2% Cloths 1 Application(s) Topical daily  famotidine    Tablet 20 milliGRAM(s) Oral two times a day  gabapentin 100 milliGRAM(s) Oral every 8 hours  heparin  Infusion 1050 Unit(s)/Hr (10.5 mL/Hr) IV Continuous <Continuous>  insulin lispro (ADMELOG) corrective regimen sliding scale   SubCutaneous three times a day before meals  insulin lispro (ADMELOG) corrective regimen sliding scale   SubCutaneous at bedtime  metoprolol tartrate 12.5 milliGRAM(s) Oral every 12 hours  polyethylene glycol 3350 17 Gram(s) Oral daily  senna 2 Tablet(s) Oral at bedtime  sodium chloride 0.9%. 1000 milliLiter(s) (10 mL/Hr) IV Continuous <Continuous>    MEDICATIONS  (PRN):  acetaminophen     Tablet .. 650 milliGRAM(s) Oral every 6 hours PRN Mild Pain (1 - 3)  oxyCODONE    IR 10 milliGRAM(s) Oral every 4 hours PRN Severe Pain (7 - 10)  oxyCODONE    IR 5 milliGRAM(s) Oral every 4 hours PRN Moderate Pain (4 - 6)       Care plan discussed with the ICU care team.   Patient clinically stable to be downgraded to floor    I have spent 30 minutes providing critical care management to this patient.        I, Kai cordoba NP, personally performed the services described in this documentation. all medical record entries made by the student were at my direction and in my presence. I have reviewed the chart and agree that the record reflects my personal performance and is accurate and complete   Patient seen and examined at the bedside.    Remained critically ill on continuous ICU monitoring.    OBJECTIVE:  Vital Signs Last 24 Hrs  T(C): 36.3 (28 Oct 2022 08:00), Max: 37.2 (27 Oct 2022 19:15)  T(F): 97.3 (28 Oct 2022 08:00), Max: 99 (27 Oct 2022 19:15)  HR: 78 (28 Oct 2022 08:00) (69 - 90)  BP: 106/61 (28 Oct 2022 08:00) (98/55 - 122/63)  BP(mean): 78 (28 Oct 2022 08:00) (71 - 90)  RR: 27 (28 Oct 2022 08:00) (11 - 28)  SpO2: 99% (28 Oct 2022 08:00) (98% - 100%)    Parameters below as of 28 Oct 2022 08:00  Patient On (Oxygen Delivery Method): nasal cannula  O2 Flow (L/min): 3  O2 Concentration (%): 32    REVIEW OF SYSTEMS:  RES: no SOB  CARD: no chest pain     *if unable to obtain* [x ] N/A    Physical Exam:  General: NAD, OOB to chair  Neurology: A&Ox3, nonfocal, KO x 4  Eyes: PERRLA/ EOMI, Gross vision intact  ENT/Neck: Neck supple, trachea midline, No JVD, Gross hearing intact  Respiratory: No wheezing, rhonchi, remains on Nasal canula 3 LPM  CV: RRR, S1S2, no murmurs, rubs or gallops        [X] Sternal dressing, [] Mediastinal CT, [] Pleural CT, [] HAWK drain        [X] Sinus rhythm, [] Afib, [] Temporary pacing, [] PPM  Abdominal: Soft, NT, ND +BS,   Extremities: 1-2+ pedal edema noted, + peripheral pulses  Skin: No Rashes, Hematoma, Ecchymosis         Assessment:  52 year old Gambian male, current 0.5 ppd smoker, with no know significant past medical history who presented to the ED at [facility] after being transferred from [facility] with abnormal ECG findings. The patient presented to [facility] earlier today with a 1 year history of exertional chest pain, consistent with typical angina, worse in the last 24 hours. Troponin T at [facility] WNL, hsTroponin ~2 hours later on admission labs WNL. On arrival ECG demonstrated ST segmental elevations in anterolateral leads with Q waves suggestive of prior infarct with ongoing symptoms. ECG did not meet criteria for STEMI. He was given brilenta 180 mg PO, loaded with IV heparin and started on heparin ggt. Patient was consented for cardiac catheterization. Patient to be admitted for management of NSTEMI. Seen by Cardiology    CAD s/p CABG 10/24/2022  Hemodynamic instability  Hypovolemia  Leukocytosis   Post op respiratory insufficiency  Acute blood loss anemia  Thrombocytopenia  Stress hyperglycemia    Plan:   ***Neuro***  [x] Nonfocal   Post operative neuro assessment   continue prn dilaudid/ oxy for analgesia with gabapentin and APAP for adjunct therapy      ***Cardiovascular***  Low EF post op: 45%, RV dysfunction, EPM VVI 40/10 , Primacor drip off since yesterday      TTE on 10/25 revealed (EF 67%), normal MV, normal RV size and function, normal TV, no pericardial effusion seen.   LV thrombus - echo results inconclusive  c/w metoprolol 12.5 mg bid  c/w Amiodarone for A-fib prophylaxis   Norvasc for radial artery graft   [x] VTE Prophylaxis / LV thrombus with heparin gtt  [x] ASA [x] High intensity Statin   Serial EKG and cardiac enzymes   Heparin drip for LV thrombus    ***Pulmonary***    H/O Smoking at risk for post op pulmonary complication  [x] 3L NC O2 sat 100%  Encourage incentive spirometry, continue pulse ox monitoring  Lung expansion maneuvers     ***GI***  [x]  Diet: Tolerating PO consistent carb diet.   [x]  Pepcid  Bowel regimen with Miralax and senna   Reglan for gut motility       ***Renal***  Continue to monitor I/Os, BUN/Creatinine.   Replete lytes PRN.   Benjamin present [X ] negative [ ] positive     ***ID***  No active antibiotic coverage        ***Endocrine***  [x] Hyperglycemia  : HbA1c 5.7%              - Stress Hyperglycemia             - [x] ISS     - Need tight glycemic control to prevent wound infection.        ALL MEDICATIONS (delete after use)  MEDICATIONS  (STANDING):  amLODIPine   Tablet 5 milliGRAM(s) Oral daily  ascorbic acid 500 milliGRAM(s) Oral two times a day  aspirin enteric coated 81 milliGRAM(s) Oral daily  atorvastatin 80 milliGRAM(s) Oral at bedtime  bisacodyl Suppository 10 milliGRAM(s) Rectal once  chlorhexidine 2% Cloths 1 Application(s) Topical daily  famotidine    Tablet 20 milliGRAM(s) Oral two times a day  gabapentin 100 milliGRAM(s) Oral every 8 hours  heparin  Infusion 1050 Unit(s)/Hr (10.5 mL/Hr) IV Continuous <Continuous>  insulin lispro (ADMELOG) corrective regimen sliding scale   SubCutaneous three times a day before meals  insulin lispro (ADMELOG) corrective regimen sliding scale   SubCutaneous at bedtime  metoprolol tartrate 12.5 milliGRAM(s) Oral every 12 hours  polyethylene glycol 3350 17 Gram(s) Oral daily  senna 2 Tablet(s) Oral at bedtime  sodium chloride 0.9%. 1000 milliLiter(s) (10 mL/Hr) IV Continuous <Continuous>    MEDICATIONS  (PRN):  acetaminophen     Tablet .. 650 milliGRAM(s) Oral every 6 hours PRN Mild Pain (1 - 3)  oxyCODONE    IR 10 milliGRAM(s) Oral every 4 hours PRN Severe Pain (7 - 10)  oxyCODONE    IR 5 milliGRAM(s) Oral every 4 hours PRN Moderate Pain (4 - 6)       Care plan discussed with the ICU care team.   Patient clinically stable to be downgraded to floor    I have spent 30 minutes providing critical care management to this patient.        I, Kai cordoba NP, personally performed the services described in this documentation. all medical record entries made by the student were at my direction and in my presence. I have reviewed the chart and agree that the record reflects my personal performance and is accurate and complete

## 2022-10-29 ENCOUNTER — TRANSCRIPTION ENCOUNTER (OUTPATIENT)
Age: 52
End: 2022-10-29

## 2022-10-29 VITALS — WEIGHT: 154.32 LBS

## 2022-10-29 LAB
ALBUMIN SERPL ELPH-MCNC: 3.1 G/DL — LOW (ref 3.3–5)
ALP SERPL-CCNC: 36 U/L — LOW (ref 40–120)
ALT FLD-CCNC: 45 U/L — SIGNIFICANT CHANGE UP (ref 10–45)
ANION GAP SERPL CALC-SCNC: 10 MMOL/L — SIGNIFICANT CHANGE UP (ref 5–17)
APTT BLD: >200 SEC — CRITICAL HIGH (ref 27.5–35.5)
AST SERPL-CCNC: 26 U/L — SIGNIFICANT CHANGE UP (ref 10–40)
BASOPHILS # BLD AUTO: 0.02 K/UL — SIGNIFICANT CHANGE UP (ref 0–0.2)
BASOPHILS NFR BLD AUTO: 0.2 % — SIGNIFICANT CHANGE UP (ref 0–2)
BILIRUB SERPL-MCNC: 0.8 MG/DL — SIGNIFICANT CHANGE UP (ref 0.2–1.2)
BUN SERPL-MCNC: 13 MG/DL — SIGNIFICANT CHANGE UP (ref 7–23)
CALCIUM SERPL-MCNC: 8.3 MG/DL — LOW (ref 8.4–10.5)
CHLORIDE SERPL-SCNC: 100 MMOL/L — SIGNIFICANT CHANGE UP (ref 96–108)
CO2 SERPL-SCNC: 24 MMOL/L — SIGNIFICANT CHANGE UP (ref 22–31)
CREAT SERPL-MCNC: 0.88 MG/DL — SIGNIFICANT CHANGE UP (ref 0.5–1.3)
EGFR: 103 ML/MIN/1.73M2 — SIGNIFICANT CHANGE UP
EOSINOPHIL # BLD AUTO: 0.18 K/UL — SIGNIFICANT CHANGE UP (ref 0–0.5)
EOSINOPHIL NFR BLD AUTO: 1.9 % — SIGNIFICANT CHANGE UP (ref 0–6)
GLUCOSE BLDC GLUCOMTR-MCNC: 96 MG/DL — SIGNIFICANT CHANGE UP (ref 70–99)
GLUCOSE BLDC GLUCOMTR-MCNC: 99 MG/DL — SIGNIFICANT CHANGE UP (ref 70–99)
GLUCOSE SERPL-MCNC: 194 MG/DL — HIGH (ref 70–99)
HCT VFR BLD CALC: 22.4 % — LOW (ref 39–50)
HGB BLD-MCNC: 7.7 G/DL — LOW (ref 13–17)
IMM GRANULOCYTES NFR BLD AUTO: 0.4 % — SIGNIFICANT CHANGE UP (ref 0–0.9)
LYMPHOCYTES # BLD AUTO: 2.63 K/UL — SIGNIFICANT CHANGE UP (ref 1–3.3)
LYMPHOCYTES # BLD AUTO: 27.4 % — SIGNIFICANT CHANGE UP (ref 13–44)
MCHC RBC-ENTMCNC: 32.4 PG — SIGNIFICANT CHANGE UP (ref 27–34)
MCHC RBC-ENTMCNC: 34.4 GM/DL — SIGNIFICANT CHANGE UP (ref 32–36)
MCV RBC AUTO: 94.1 FL — SIGNIFICANT CHANGE UP (ref 80–100)
MONOCYTES # BLD AUTO: 1.11 K/UL — HIGH (ref 0–0.9)
MONOCYTES NFR BLD AUTO: 11.6 % — SIGNIFICANT CHANGE UP (ref 2–14)
NEUTROPHILS # BLD AUTO: 5.62 K/UL — SIGNIFICANT CHANGE UP (ref 1.8–7.4)
NEUTROPHILS NFR BLD AUTO: 58.5 % — SIGNIFICANT CHANGE UP (ref 43–77)
NRBC # BLD: 0 /100 WBCS — SIGNIFICANT CHANGE UP (ref 0–0)
PLATELET # BLD AUTO: 147 K/UL — LOW (ref 150–400)
POTASSIUM SERPL-MCNC: 4 MMOL/L — SIGNIFICANT CHANGE UP (ref 3.5–5.3)
POTASSIUM SERPL-SCNC: 4 MMOL/L — SIGNIFICANT CHANGE UP (ref 3.5–5.3)
PROT SERPL-MCNC: 5.5 G/DL — LOW (ref 6–8.3)
RBC # BLD: 2.38 M/UL — LOW (ref 4.2–5.8)
RBC # FLD: 12.6 % — SIGNIFICANT CHANGE UP (ref 10.3–14.5)
SODIUM SERPL-SCNC: 134 MMOL/L — LOW (ref 135–145)
WBC # BLD: 9.6 K/UL — SIGNIFICANT CHANGE UP (ref 3.8–10.5)
WBC # FLD AUTO: 9.6 K/UL — SIGNIFICANT CHANGE UP (ref 3.8–10.5)

## 2022-10-29 PROCEDURE — 82565 ASSAY OF CREATININE: CPT

## 2022-10-29 PROCEDURE — 85018 HEMOGLOBIN: CPT

## 2022-10-29 PROCEDURE — 82947 ASSAY GLUCOSE BLOOD QUANT: CPT

## 2022-10-29 PROCEDURE — 85014 HEMATOCRIT: CPT

## 2022-10-29 PROCEDURE — 94002 VENT MGMT INPAT INIT DAY: CPT

## 2022-10-29 PROCEDURE — 93005 ELECTROCARDIOGRAM TRACING: CPT

## 2022-10-29 PROCEDURE — C8929: CPT

## 2022-10-29 PROCEDURE — 83735 ASSAY OF MAGNESIUM: CPT

## 2022-10-29 PROCEDURE — 87641 MR-STAPH DNA AMP PROBE: CPT

## 2022-10-29 PROCEDURE — 82330 ASSAY OF CALCIUM: CPT

## 2022-10-29 PROCEDURE — A9585: CPT

## 2022-10-29 PROCEDURE — 84480 ASSAY TRIIODOTHYRONINE (T3): CPT

## 2022-10-29 PROCEDURE — 96374 THER/PROPH/DIAG INJ IV PUSH: CPT

## 2022-10-29 PROCEDURE — 93880 EXTRACRANIAL BILAT STUDY: CPT

## 2022-10-29 PROCEDURE — 83690 ASSAY OF LIPASE: CPT

## 2022-10-29 PROCEDURE — P9045: CPT

## 2022-10-29 PROCEDURE — P9016: CPT

## 2022-10-29 PROCEDURE — 36430 TRANSFUSION BLD/BLD COMPNT: CPT

## 2022-10-29 PROCEDURE — C1887: CPT

## 2022-10-29 PROCEDURE — 93306 TTE W/DOPPLER COMPLETE: CPT

## 2022-10-29 PROCEDURE — P9047: CPT

## 2022-10-29 PROCEDURE — 97162 PT EVAL MOD COMPLEX 30 MIN: CPT

## 2022-10-29 PROCEDURE — 84295 ASSAY OF SERUM SODIUM: CPT

## 2022-10-29 PROCEDURE — 82803 BLOOD GASES ANY COMBINATION: CPT

## 2022-10-29 PROCEDURE — 86900 BLOOD TYPING SEROLOGIC ABO: CPT

## 2022-10-29 PROCEDURE — 82962 GLUCOSE BLOOD TEST: CPT

## 2022-10-29 PROCEDURE — 84100 ASSAY OF PHOSPHORUS: CPT

## 2022-10-29 PROCEDURE — 84436 ASSAY OF TOTAL THYROXINE: CPT

## 2022-10-29 PROCEDURE — 97116 GAIT TRAINING THERAPY: CPT

## 2022-10-29 PROCEDURE — C1769: CPT

## 2022-10-29 PROCEDURE — 84443 ASSAY THYROID STIM HORMONE: CPT

## 2022-10-29 PROCEDURE — 93308 TTE F-UP OR LMTD: CPT | Mod: 26

## 2022-10-29 PROCEDURE — 86901 BLOOD TYPING SEROLOGIC RH(D): CPT

## 2022-10-29 PROCEDURE — 71045 X-RAY EXAM CHEST 1 VIEW: CPT

## 2022-10-29 PROCEDURE — 82550 ASSAY OF CK (CPK): CPT

## 2022-10-29 PROCEDURE — 80048 BASIC METABOLIC PNL TOTAL CA: CPT

## 2022-10-29 PROCEDURE — C1751: CPT

## 2022-10-29 PROCEDURE — 36415 COLL VENOUS BLD VENIPUNCTURE: CPT

## 2022-10-29 PROCEDURE — 82435 ASSAY OF BLOOD CHLORIDE: CPT

## 2022-10-29 PROCEDURE — 80053 COMPREHEN METABOLIC PANEL: CPT

## 2022-10-29 PROCEDURE — 86891 AUTOLOGOUS BLOOD OP SALVAGE: CPT

## 2022-10-29 PROCEDURE — 85520 HEPARIN ASSAY: CPT

## 2022-10-29 PROCEDURE — 87640 STAPH A DNA AMP PROBE: CPT

## 2022-10-29 PROCEDURE — 97165 OT EVAL LOW COMPLEX 30 MIN: CPT

## 2022-10-29 PROCEDURE — 86850 RBC ANTIBODY SCREEN: CPT

## 2022-10-29 PROCEDURE — 80061 LIPID PANEL: CPT

## 2022-10-29 PROCEDURE — 93458 L HRT ARTERY/VENTRICLE ANGIO: CPT

## 2022-10-29 PROCEDURE — 85384 FIBRINOGEN ACTIVITY: CPT

## 2022-10-29 PROCEDURE — 94010 BREATHING CAPACITY TEST: CPT

## 2022-10-29 PROCEDURE — 71046 X-RAY EXAM CHEST 2 VIEWS: CPT

## 2022-10-29 PROCEDURE — 93321 DOPPLER ECHO F-UP/LMTD STD: CPT

## 2022-10-29 PROCEDURE — 86923 COMPATIBILITY TEST ELECTRIC: CPT

## 2022-10-29 PROCEDURE — U0005: CPT

## 2022-10-29 PROCEDURE — 84132 ASSAY OF SERUM POTASSIUM: CPT

## 2022-10-29 PROCEDURE — 99285 EMERGENCY DEPT VISIT HI MDM: CPT | Mod: 25

## 2022-10-29 PROCEDURE — 85576 BLOOD PLATELET AGGREGATION: CPT

## 2022-10-29 PROCEDURE — 87637 SARSCOV2&INF A&B&RSV AMP PRB: CPT

## 2022-10-29 PROCEDURE — 83036 HEMOGLOBIN GLYCOSYLATED A1C: CPT

## 2022-10-29 PROCEDURE — 81003 URINALYSIS AUTO W/O SCOPE: CPT

## 2022-10-29 PROCEDURE — U0003: CPT

## 2022-10-29 PROCEDURE — 84484 ASSAY OF TROPONIN QUANT: CPT

## 2022-10-29 PROCEDURE — 75561 CARDIAC MRI FOR MORPH W/DYE: CPT

## 2022-10-29 PROCEDURE — 93308 TTE F-UP OR LMTD: CPT

## 2022-10-29 PROCEDURE — C1889: CPT

## 2022-10-29 PROCEDURE — 83880 ASSAY OF NATRIURETIC PEPTIDE: CPT

## 2022-10-29 PROCEDURE — 85025 COMPLETE CBC W/AUTO DIFF WBC: CPT

## 2022-10-29 PROCEDURE — 85027 COMPLETE CBC AUTOMATED: CPT

## 2022-10-29 PROCEDURE — 85610 PROTHROMBIN TIME: CPT

## 2022-10-29 PROCEDURE — 82553 CREATINE MB FRACTION: CPT

## 2022-10-29 PROCEDURE — 83605 ASSAY OF LACTIC ACID: CPT

## 2022-10-29 PROCEDURE — 93321 DOPPLER ECHO F-UP/LMTD STD: CPT | Mod: 26

## 2022-10-29 PROCEDURE — 85730 THROMBOPLASTIN TIME PARTIAL: CPT

## 2022-10-29 RX ORDER — COLCHICINE 0.6 MG
0.6 TABLET ORAL
Refills: 0 | Status: DISCONTINUED | OUTPATIENT
Start: 2022-10-29 | End: 2022-10-29

## 2022-10-29 RX ORDER — METOPROLOL TARTRATE 50 MG
12.5 TABLET ORAL ONCE
Refills: 0 | Status: COMPLETED | OUTPATIENT
Start: 2022-10-29 | End: 2022-10-29

## 2022-10-29 RX ORDER — ASPIRIN/CALCIUM CARB/MAGNESIUM 324 MG
1 TABLET ORAL
Qty: 30 | Refills: 0
Start: 2022-10-29 | End: 2022-11-27

## 2022-10-29 RX ORDER — OXYCODONE HYDROCHLORIDE 5 MG/1
1 TABLET ORAL
Qty: 30 | Refills: 0
Start: 2022-10-29 | End: 2022-11-02

## 2022-10-29 RX ORDER — METOPROLOL TARTRATE 50 MG
25 TABLET ORAL
Refills: 0 | Status: DISCONTINUED | OUTPATIENT
Start: 2022-10-29 | End: 2022-10-29

## 2022-10-29 RX ORDER — COLCHICINE 0.6 MG
1 TABLET ORAL
Qty: 20 | Refills: 0
Start: 2022-10-29 | End: 2022-11-07

## 2022-10-29 RX ORDER — APIXABAN 2.5 MG/1
1 TABLET, FILM COATED ORAL
Qty: 60 | Refills: 0
Start: 2022-10-29 | End: 2022-11-27

## 2022-10-29 RX ORDER — ATORVASTATIN CALCIUM 80 MG/1
1 TABLET, FILM COATED ORAL
Qty: 30 | Refills: 0
Start: 2022-10-29 | End: 2022-11-27

## 2022-10-29 RX ORDER — SENNA PLUS 8.6 MG/1
2 TABLET ORAL
Qty: 20 | Refills: 0
Start: 2022-10-29 | End: 2022-11-07

## 2022-10-29 RX ORDER — ACETAMINOPHEN 500 MG
2 TABLET ORAL
Qty: 0 | Refills: 0 | DISCHARGE
Start: 2022-10-29

## 2022-10-29 RX ORDER — FAMOTIDINE 10 MG/ML
1 INJECTION INTRAVENOUS
Qty: 10 | Refills: 0
Start: 2022-10-29 | End: 2022-11-07

## 2022-10-29 RX ADMIN — FAMOTIDINE 20 MILLIGRAM(S): 10 INJECTION INTRAVENOUS at 05:02

## 2022-10-29 RX ADMIN — GABAPENTIN 100 MILLIGRAM(S): 400 CAPSULE ORAL at 05:01

## 2022-10-29 RX ADMIN — Medication 0.6 MILLIGRAM(S): at 09:50

## 2022-10-29 RX ADMIN — SODIUM CHLORIDE 3 MILLILITER(S): 9 INJECTION INTRAMUSCULAR; INTRAVENOUS; SUBCUTANEOUS at 05:35

## 2022-10-29 RX ADMIN — HEPARIN SODIUM 11.5 UNIT(S)/HR: 5000 INJECTION INTRAVENOUS; SUBCUTANEOUS at 09:12

## 2022-10-29 RX ADMIN — Medication 500 MILLIGRAM(S): at 05:01

## 2022-10-29 RX ADMIN — Medication 81 MILLIGRAM(S): at 10:27

## 2022-10-29 RX ADMIN — Medication 12.5 MILLIGRAM(S): at 05:01

## 2022-10-29 RX ADMIN — AMLODIPINE BESYLATE 5 MILLIGRAM(S): 2.5 TABLET ORAL at 05:02

## 2022-10-29 RX ADMIN — CHLORHEXIDINE GLUCONATE 1 APPLICATION(S): 213 SOLUTION TOPICAL at 10:24

## 2022-10-29 RX ADMIN — Medication 12.5 MILLIGRAM(S): at 08:34

## 2022-10-29 NOTE — DISCHARGE NOTE PROVIDER - [id number]_GEN_ALL_CORE
PHYSICAL EXAM:  Neurology: alert and oriented x 3, nonfocal, no gross deficits  CV : S1S2 + rub  Sternal Wound :  CDI , Stable  Lungs:  cta  Abdomen: soft, nontender, nondistended, positive bowel sounds, last bowel movement   +bm      Extremities:     tr. edema LLE inc cdi ace wrap no calf tenderness

## 2022-10-29 NOTE — DISCHARGE NOTE NURSING/CASE MANAGEMENT/SOCIAL WORK - PATIENT PORTAL LINK FT
You can access the FollowMyHealth Patient Portal offered by [facility] by registering at the following website: [email or web address]. By joining [employer]’s FollowMyHealth portal, you will also be able to view your health information using other applications (apps) compatible with our system.

## 2022-10-29 NOTE — PROVIDER CONTACT NOTE (CRITICAL VALUE NOTIFICATION) - BACKGROUND
Admitted 10/17 complaining of chest pain, S/P Cardiac Cath- 3 VD for Cardiac surgery. JAYNA shows LV Thombus and pt. started Heparin drip. 10/24 S/P CABG - Left radial and Right leg [place].

## 2022-10-29 NOTE — PROGRESS NOTE ADULT - PROBLEM SELECTOR PROBLEM 1
CAD (coronary artery disease)
Chest pain
CAD (coronary artery disease)

## 2022-10-29 NOTE — DISCHARGE NOTE PROVIDER - NSDCCPCAREPLAN_GEN_ALL_CORE_FT
PRINCIPAL DISCHARGE DIAGNOSIS  Diagnosis: S/P CABG x 3  Assessment and Plan of Treatment: Refer to your Cardiac Surgery Do's & Dont's Fact Sheet  shower daily - wash your incision with mild soap and water   weigh yourself daily -report weight gain > 2.5 pounds overnight to your MD  ambulate 4-5 times a day  take medications as prescribed  follow up appt with Dr. Cain in 2 wks

## 2022-10-29 NOTE — PROGRESS NOTE ADULT - PROBLEM SELECTOR PLAN 2
Continue antihypertensive medications with hold parameters
Continue Lopressor 12.5mg BID and increase as tolerated  Norvasc 5mg QD
Continue Lopressor 12.5mg BID and increase as tolerated  Norvasc 5mg QD

## 2022-10-29 NOTE — DISCHARGE NOTE NURSING/CASE MANAGEMENT/SOCIAL WORK - NSDCPEWEB_GEN_ALL_CORE
[facility] for Tobacco Control website --- [email or web address] website --- [email or web address]

## 2022-10-29 NOTE — DISCHARGE NOTE PROVIDER - NSDCFUADDAPPT_GEN_ALL_CORE_FT
follow up appt with Dr. Hardik Cain in 2 weeks - call [phone] on Monday after 9am to schedule your appt date & time  follow up appt with your Primary Care MD and/or Cardiologisit in 2-3 wks

## 2022-10-29 NOTE — DISCHARGE NOTE PROVIDER - NSDCMRMEDTOKEN_GEN_ALL_CORE_FT
acetaminophen: 2 tab(s) orally every 6 hours, As Needed  aspirin 81 mg oral delayed release tablet: 1 tab(s) orally once a day  atorvastatin 80 mg oral tablet: 1 tab(s) orally once a day (at bedtime)  colchicine 0.6 mg oral tablet: 1 tab(s) orally 2 times a day  Eliquis 5 mg oral tablet: 1 tab(s) orally 2 times a day   famotidine 20 mg oral tablet: 1 tab(s) orally once a day   oxyCODONE 5 mg oral tablet: 1 tab(s) orally every 4 hours, As needed, Moderate Pain (4 - 6) MDD:4  senna leaf extract oral tablet: 2 tab(s) orally once a day (at bedtime)

## 2022-10-29 NOTE — PROGRESS NOTE ADULT - ASSESSMENT
52 year old Togol[place] male, current 0.5 ppd smoker, with no know significant past medical history who presented to the ED at [facility] after being transferred from [facility] with abnormal ECG findings. At [facility] s/p cardiac cath with severe 3 vessel disease and is now S/P CABG 3 on 10/24. Post op recovery included inotropic requirement, LV thrombus for which he is on a Heparin gtt, bleeding from SVG site post HAWK pull requiring 1 unit PRBC on 10/27.  10/28-Transferred to [address] [place] [address]., VSS SR on monitor, on Heparin gtt with SVG graft wrapped.   10/29 VSS-+rub auscultated-hct 22.4 - pt ambulating in hallway - asymptomatic . will get echo this am.  52 year old Trinid[place] male, current 0.5 ppd smoker, with no know significant past medical history who presented to the ED at [facility] after being transferred from [facility] with abnormal ECG findings. At [facility] s/p cardiac cath with severe 3 vessel disease and is now S/P CABG 3 on 10/24. Post op recovery included inotropic requirement, LV thrombus for which he is on a Heparin gtt, bleeding from SVG site post HAWK pull requiring 1 unit PRBC on 10/27.  10/28-Transferred to [address] [place] [address]., VSS SR on monitor, on Heparin gtt with SVG graft wrapped.   10/29 VSS-+rub auscultated-Colchicine 0.6mg po bid -hct 22.4 - pt ambulating in hallway - asymptomatic . will get echo this am.

## 2022-10-29 NOTE — PROGRESS NOTE ADULT - SUBJECTIVE AND OBJECTIVE BOX
VITAL SIGNS-Telemetry:  SR/ST   Vital Signs Last 24 Hrs  T(C): 36.8 (10-29-22 @ 04:27), Max: 36.9 (10-28-22 @ 09:00)  T(F): 98.2 (10-29-22 @ 04:27), Max: 98.4 (10-28-22 @ 09:00)  HR: 94 (10-29-22 @ 04:27) (83 - 94)  BP: 95/61 (10-29-22 @ 04:27) (95/61 - 106/68)  RR: 18 (10-29-22 @ 04:27) (18 - 20)  SpO2: 94% (10-29-22 @ 04:27) (94% - 99%)         10-28 @ 07:01  -  10-29 @ 07:00  --------------------------------------------------------  IN: 1744.5 mL / OUT: 1650 mL / NET: 94.5 mL    Daily     Daily     CAPILLARY BLOOD GLUCOSE  POCT Blood Glucose.: 99 mg/dL (29 Oct 2022 07:47)  POCT Blood Glucose.: 118 mg/dL (28 Oct 2022 21:48)  POCT Blood Glucose.: 162 mg/dL (28 Oct 2022 16:28)  POCT Blood Glucose.: 113 mg/dL (28 Oct 2022 11:22)        PHYSICAL EXAM:  Neurology: alert and oriented x 3, nonfocal, no gross deficits  CV : S1S2 + rub  Sternal Wound :  CDI , Stable  Lungs:  cta  Abdomen: soft, nontender, nondistended, positive bowel sounds, last bowel movement   +bm      Extremities:     tr. edema LLE inc cdi ace wrap no calf tenderness    acetaminophen     Tablet .. 650 milliGRAM(s) Oral every 6 hours PRN  amLODIPine   Tablet 5 milliGRAM(s) Oral daily  aspirin enteric coated 81 milliGRAM(s) Oral daily  atorvastatin 80 milliGRAM(s) Oral at bedtime  bisacodyl Suppository 10 milliGRAM(s) Rectal once  chlorhexidine 2% Cloths 1 Application(s) Topical daily  famotidine    Tablet 20 milliGRAM(s) Oral two times a day  heparin  Infusion 1050 Unit(s)/Hr IV Continuous <Continuous>  insulin lispro (ADMELOG) corrective regimen sliding scale   SubCutaneous three times a day before meals  insulin lispro (ADMELOG) corrective regimen sliding scale   SubCutaneous at bedtime  metoprolol tartrate 25 milliGRAM(s) Oral two times a day  oxyCODONE    IR 5 milliGRAM(s) Oral every 4 hours PRN  oxyCODONE    IR 10 milliGRAM(s) Oral every 4 hours PRN  polyethylene glycol 3350 17 Gram(s) Oral daily  senna 2 Tablet(s) Oral at bedtime  sodium chloride 0.9% lock flush 3 milliLiter(s) IV Push every 8 hours  sodium chloride 0.9%. 1000 milliLiter(s) IV Continuous <Continuous>    Physical Therapy Rec:   Home  [ x ]   Home w/ PT  [  ]  Rehab  [  ]  Discussed with Cardiothoracic Team at AM rounds.

## 2022-10-29 NOTE — PROGRESS NOTE ADULT - PROVIDER SPECIALTY LIST ADULT
CT Surgery
Critical Care
Internal Medicine
Cardiology
Critical Care
Internal Medicine
Internal Medicine
CT Surgery
CT Surgery
Cardiology
Critical Care
Critical Care
Internal Medicine
CT Surgery
Cardiology

## 2022-10-29 NOTE — DISCHARGE NOTE NURSING/CASE MANAGEMENT/SOCIAL WORK - NSDCPEEMAIL_GEN_ALL_CORE
[facility] for Tobacco Control email [email or web address]

## 2022-10-29 NOTE — DISCHARGE NOTE NURSING/CASE MANAGEMENT/SOCIAL WORK - NSDCPEFALRISK_GEN_ALL_CORE
For information on Fall & Injury Prevention, visit: [email or web address] OR  [email or web address] OR  https://www.cdc.gov/steadi/patient.html

## 2022-10-29 NOTE — PROGRESS NOTE ADULT - REASON FOR ADMISSION
acute MI, NSTEMI
preop CABG
acute MI, NSTEMI

## 2022-10-29 NOTE — DISCHARGE NOTE PROVIDER - CARE PROVIDER_API CALL
Hardik Cain)  Thoracic and Cardiac Surgery  [address] [place] [address]  Phone: [phone]  Fax: [phone]  Follow Up Time:

## 2022-10-29 NOTE — DISCHARGE NOTE PROVIDER - HOSPITAL COURSE
52 year old [place] male, current 0.5 ppd smoker, with no know significant past medical history who presented to the ED at [facility] after being transferred from [facility] with abnormal ECG findings. At [facility] s/p cardiac cath with severe 3 vessel disease and is now S/P CABG 3 on 10/24. Post op recovery included inotropic requirement, LV thrombus for which he is on a Heparin gtt, bleeding from SVG site post HAWK pull requiring 1 unit PRBC on 10/27.  10/28-Transferred to [address] [place] [address]., VSS SR on monitor, on Heparin gtt with SVG graft wrapped.   10/29 VSS-+rub auscultated-Colchicine 0.6mg po bid -hct 22.4 - pt ambulating in hallway - asymptomatic . will get echo this am.   echo neg - heparin d/c'd - will d/c home on Eliquis 5 bid & Colchicine .6 bid x 10 days. d/w DR. Ellis

## 2022-10-29 NOTE — PROVIDER CONTACT NOTE (CRITICAL VALUE NOTIFICATION) - ACTION/TREATMENT ORDERED:
Heparin Drip stopped at 0830 AM until further order from Provider. Observe  for S/S of bleeding. Continue monitor V/S and Telemetry.

## 2022-10-29 NOTE — DISCHARGE NOTE PROVIDER - NSDCHHPTASSISTHOME_GEN_ALL_CORE

## 2022-10-30 ENCOUNTER — TRANSCRIPTION ENCOUNTER (OUTPATIENT)
Age: 52
End: 2022-10-30

## 2022-10-31 ENCOUNTER — NON-APPOINTMENT (OUTPATIENT)
Age: 52
End: 2022-10-31

## 2022-10-31 ENCOUNTER — APPOINTMENT (OUTPATIENT)
Dept: CARE COORDINATION | Facility: HOME HEALTH | Age: 52
End: 2022-10-31

## 2022-10-31 DIAGNOSIS — F17.200 NICOTINE DEPENDENCE, UNSPECIFIED, UNCOMPLICATED: ICD-10-CM

## 2022-10-31 PROCEDURE — 99024 POSTOP FOLLOW-UP VISIT: CPT

## 2022-10-31 RX ORDER — ASPIRIN 81 MG/1
81 TABLET, COATED ORAL DAILY
Refills: 0 | Status: ACTIVE | COMMUNITY
Start: 2022-10-31

## 2022-10-31 RX ORDER — FAMOTIDINE 20 MG/1
20 TABLET, FILM COATED ORAL
Qty: 60 | Refills: 0 | Status: ACTIVE | COMMUNITY
Start: 2022-10-31

## 2022-10-31 RX ORDER — ATORVASTATIN CALCIUM 80 MG/1
80 TABLET, FILM COATED ORAL
Qty: 60 | Refills: 0 | Status: ACTIVE | COMMUNITY
Start: 2022-10-31

## 2022-10-31 RX ORDER — OXYCODONE 5 MG/1
5 TABLET ORAL EVERY 4 HOURS
Refills: 0 | Status: ACTIVE | COMMUNITY
Start: 2022-10-31

## 2022-10-31 NOTE — REASON FOR VISIT
[Home] : at home, [unfilled] , at the time of the visit. [Other Location: e.g. Home (Enter Location, City,State)___] : at [unfilled] [Spouse] : spouse [Patient] : the patient [Self] : self [Follow-Up: _____] : a [unfilled] follow-up visit

## 2022-10-31 NOTE — PHYSICAL EXAM
[] : no respiratory distress [No Pitting Edema] : no pitting edema present [FreeTextEntry1] : sternotomy incision is clean and dressing intact. SVG site clean and intact.  [Oriented To Time, Place, And Person] : oriented to person, place, and time

## 2022-10-31 NOTE — ASSESSMENT
[FreeTextEntry1] : 52 year old [place] male, current 0.5 ppd smoker, with no know significant past medical history who presented to the ED at [facility] after being transferred from [facility] with abnormal ECG findings. At [facility] s/p cardiac cath with severe 3 vessel disease and is now S/P CABG 3 on 10/24. Post op recovery included inotropic requirement, LV thrombus for which he is on a Heparin gtt, bleeding from SVG site post HAWK pull requiring 1 unit PRBC on 10/27. 10/28-Transferred to [address] [place] [address]., VSS SR on monitor, on Heparin gtt with SVG graft wrapped. 10/29 VSS-+rub auscultated-Colchicine 0.6mg po bid -hct 22.4 - pt ambulating in hallway - asymptomatic . will get echo this am. echo neg - heparin d/c'd - will d/c home on Eliquis 5 bid & Colchicine .6 bid x 10 days. d/w DR. Ellis. He was discharged to home and agrees to telehealth visit. Medication reconciliation performed. Discharge instructions reviewed as well as follow up visits.\par \par Today on virtual exam no use of accessory muscles noted or respiratory distress, sternal  incision clean, dry and intact.  SVG site is clean, dry and intact.  No peripheral edema noted. \par \par Instructed patient on importance of optimal glycemic control, daily showering, daily weights and no heavy lifting. Instructed to call office with any signs of fever (temperature greater than 101F, chills, increasing shortness of breath, palpitations or lightheadedness or syncope. Also reinforced with patient to call if any weight gain of 2 or more pounds in 1 day or 3 or more pounds in 1 week. \par \par \par Plan:\par 1) Care navigator role reviewed with patient \par 2) All questions were answered and concerns addressed\par \par \par

## 2022-10-31 NOTE — HISTORY OF PRESENT ILLNESS
[FreeTextEntry1] : 52 year old [place] Irish male, current 0.5 ppd smoker, with no know significant past medical history who presented to the ED at [facility] after being transferred from [facility] with abnormal ECG findings. At [facility] s/p cardiac cath with severe 3 vessel disease and is now S/P CABG 3 on 10/24. Post op recovery included inotropic requirement, LV thrombus for which he is on a Heparin gtt, bleeding from SVG site post HAWK pull requiring 1 unit PRBC on 10/27. 10/28-Transferred to [address] [place] [address]., VSS SR on monitor, on Heparin gtt with SVG graft wrapped. 10/29 VSS-+rub auscultated-Colchicine 0.6mg po bid -hct 22.4 - pt ambulating in hallway - asymptomatic . will get echo this am. echo neg - heparin d/c'd - will d/c home on Eliquis 5 bid & Colchicine .6 bid x 10 days. d/w DR. Elils. He was discharged to home and agrees to telehealth visit.

## 2022-11-08 ENCOUNTER — APPOINTMENT (OUTPATIENT)
Dept: CARDIOTHORACIC SURGERY | Facility: CLINIC | Age: 52
End: 2022-11-08

## 2022-11-08 ENCOUNTER — OUTPATIENT (OUTPATIENT)
Dept: OUTPATIENT SERVICES | Facility: HOSPITAL | Age: 52
LOS: 1 days | End: 2022-11-08
Payer: SELF-PAY

## 2022-11-08 VITALS — HEIGHT: 66 IN | WEIGHT: 154 LBS | BODY MASS INDEX: 24.75 KG/M2

## 2022-11-08 VITALS
RESPIRATION RATE: 15 BRPM | OXYGEN SATURATION: 98 % | TEMPERATURE: 98 F | SYSTOLIC BLOOD PRESSURE: 114 MMHG | HEART RATE: 99 BPM | DIASTOLIC BLOOD PRESSURE: 80 MMHG

## 2022-11-08 DIAGNOSIS — E78.5 HYPERLIPIDEMIA, UNSPECIFIED: ICD-10-CM

## 2022-11-08 DIAGNOSIS — Z95.1 PRESENCE OF AORTOCORONARY BYPASS GRAFT: ICD-10-CM

## 2022-11-08 PROCEDURE — 71046 X-RAY EXAM CHEST 2 VIEWS: CPT | Mod: 26

## 2022-11-08 PROCEDURE — 99024 POSTOP FOLLOW-UP VISIT: CPT

## 2022-11-08 PROCEDURE — 71046 X-RAY EXAM CHEST 2 VIEWS: CPT

## 2022-11-08 RX ORDER — METOPROLOL SUCCINATE 25 MG/1
25 TABLET, EXTENDED RELEASE ORAL
Qty: 90 | Refills: 1 | Status: ACTIVE | COMMUNITY
Start: 2022-11-08 | End: 1900-01-01

## 2022-11-11 ENCOUNTER — APPOINTMENT (OUTPATIENT)
Dept: CARDIOTHORACIC SURGERY | Facility: CLINIC | Age: 52
End: 2022-11-11

## 2022-11-11 ENCOUNTER — OUTPATIENT (OUTPATIENT)
Dept: OUTPATIENT SERVICES | Facility: HOSPITAL | Age: 52
LOS: 1 days | End: 2022-11-11
Payer: SELF-PAY

## 2022-11-11 VITALS
OXYGEN SATURATION: 94 % | RESPIRATION RATE: 16 BRPM | SYSTOLIC BLOOD PRESSURE: 108 MMHG | DIASTOLIC BLOOD PRESSURE: 72 MMHG

## 2022-11-11 VITALS — HEART RATE: 98 BPM | OXYGEN SATURATION: 99 % | DIASTOLIC BLOOD PRESSURE: 80 MMHG | SYSTOLIC BLOOD PRESSURE: 114 MMHG

## 2022-11-11 VITALS
TEMPERATURE: 98 F | HEART RATE: 106 BPM | DIASTOLIC BLOOD PRESSURE: 76 MMHG | SYSTOLIC BLOOD PRESSURE: 109 MMHG | RESPIRATION RATE: 17 BRPM | HEIGHT: 66 IN | BODY MASS INDEX: 24.11 KG/M2 | WEIGHT: 150 LBS | OXYGEN SATURATION: 97 %

## 2022-11-11 VITALS — DIASTOLIC BLOOD PRESSURE: 71 MMHG | SYSTOLIC BLOOD PRESSURE: 101 MMHG

## 2022-11-11 VITALS — DIASTOLIC BLOOD PRESSURE: 68 MMHG | SYSTOLIC BLOOD PRESSURE: 102 MMHG

## 2022-11-11 VITALS — DIASTOLIC BLOOD PRESSURE: 69 MMHG | SYSTOLIC BLOOD PRESSURE: 102 MMHG | OXYGEN SATURATION: 95 %

## 2022-11-11 VITALS — SYSTOLIC BLOOD PRESSURE: 109 MMHG | DIASTOLIC BLOOD PRESSURE: 77 MMHG

## 2022-11-11 VITALS
OXYGEN SATURATION: 96 % | HEART RATE: 101 BPM | DIASTOLIC BLOOD PRESSURE: 70 MMHG | RESPIRATION RATE: 16 BRPM | SYSTOLIC BLOOD PRESSURE: 100 MMHG

## 2022-11-11 VITALS — HEART RATE: 102 BPM | DIASTOLIC BLOOD PRESSURE: 65 MMHG | OXYGEN SATURATION: 95 % | SYSTOLIC BLOOD PRESSURE: 96 MMHG

## 2022-11-11 DIAGNOSIS — Z79.01 LONG TERM (CURRENT) USE OF ANTICOAGULANTS: ICD-10-CM

## 2022-11-11 DIAGNOSIS — J90 PLEURAL EFFUSION, NOT ELSEWHERE CLASSIFIED: ICD-10-CM

## 2022-11-11 DIAGNOSIS — I24.0 ACUTE CORONARY THROMBOSIS NOT RESULTING IN MYOCARDIAL INFARCTION: ICD-10-CM

## 2022-11-11 PROCEDURE — 71046 X-RAY EXAM CHEST 2 VIEWS: CPT

## 2022-11-11 PROCEDURE — 71046 X-RAY EXAM CHEST 2 VIEWS: CPT | Mod: 26

## 2022-11-11 RX ORDER — APIXABAN 5 MG/1
5 TABLET, FILM COATED ORAL
Qty: 30 | Refills: 0 | Status: ACTIVE | COMMUNITY
Start: 2022-10-31

## 2022-11-11 NOTE — COUNSELING
[Hygeine (Including Daily Shower)] : hygeine (including daily shower) [Importance of Regular Medical Follow-Up] : the importance of regular medical follow-up [No Heavy Lifting] : no heavy lifting (>15-20 lb. for 1 month or 25 lb. for 3 months from date of surgery) [Blood Pressure Control] : blood pressure control [Weight Management] : weight management [S/S of infection] : signs and symptoms of infection (and to whom it should be reported) [Progressive Ambulation/Activity] : progressive ambulation/activity [Medication/Vitamin/Herb/Food Interaction] : medication/vitamin/herb/food interaction [Low Fat/Low Cholesterol Diet] : low fat/low cholesterol diet [Stress Management] : stress management

## 2022-11-11 NOTE — PHYSICAL EXAM
[] : no respiratory distress [Exaggerated Use Of Accessory Muscles For Inspiration] : no accessory muscle use [Decreased Breath Sounds] : decreased breath sounds [Heart Rate And Rhythm] : heart rate was normal and rhythm regular [Clean] : clean [Dry] : dry [Healing Well] : healing well [No Edema] : no edema

## 2022-11-15 ENCOUNTER — APPOINTMENT (OUTPATIENT)
Dept: CARDIOLOGY | Facility: CLINIC | Age: 52
End: 2022-11-15

## 2022-11-28 ENCOUNTER — TRANSCRIPTION ENCOUNTER (OUTPATIENT)
Age: 52
End: 2022-11-28

## 2022-11-28 PROBLEM — Z95.1 S/P CABG (CORONARY ARTERY BYPASS GRAFT): Status: ACTIVE | Noted: 2022-10-31

## 2022-11-29 ENCOUNTER — APPOINTMENT (OUTPATIENT)
Dept: CARDIOTHORACIC SURGERY | Facility: CLINIC | Age: 52
End: 2022-11-29

## 2022-11-29 VITALS
DIASTOLIC BLOOD PRESSURE: 69 MMHG | BODY MASS INDEX: 24.27 KG/M2 | RESPIRATION RATE: 16 BRPM | SYSTOLIC BLOOD PRESSURE: 104 MMHG | TEMPERATURE: 98.3 F | HEART RATE: 99 BPM | OXYGEN SATURATION: 99 % | WEIGHT: 151 LBS | HEIGHT: 66 IN

## 2022-11-29 DIAGNOSIS — Z95.1 PRESENCE OF AORTOCORONARY BYPASS GRAFT: ICD-10-CM

## 2022-11-29 PROCEDURE — 99024 POSTOP FOLLOW-UP VISIT: CPT

## 2022-11-29 NOTE — PHYSICAL EXAM
[] : no respiratory distress [Respiration, Rhythm And Depth] : normal respiratory rhythm and effort [Auscultation Breath Sounds / Voice Sounds] : lungs were clear to auscultation bilaterally [Apical Impulse] : the apical impulse was normal [Heart Rate And Rhythm] : heart rate was normal and rhythm regular [Heart Sounds] : normal S1 and S2 [Murmurs] : no murmurs [Dry] : dry [Clean] : clean [Healing Well] : healing well [No Edema] : no edema [FreeTextEntry5] : RT SVG site

## 2022-11-29 NOTE — DISCUSSION/SUMMARY
[Doing Well] : is doing well [Excellent Pain Control] : has excellent pain control [No Sign of Infection] : is showing no signs of infection [0] : 0 [Coumadin] : the patient is not on Coumadin [de-identified] : On Eliquis

## 2022-11-29 NOTE — CONSULT LETTER
[Dear  ___] : Dear  [unfilled], [Courtesy Letter:] : I had the pleasure of seeing your patient, [unfilled], in my office today. [Please see my note below.] : Please see my note below. [Consult Closing:] : Thank you very much for allowing me to participate in the care of this patient.  If you have any questions, please do not hesitate to contact me. [Sincerely,] : Sincerely, [FreeTextEntry2] : Dr.Loukas Wayne [FreeTextEntry3] : Hardik Cain MD, FACS\par Attending Surgeon \par Cardiovascular & Thoracic Surgery\par  \par [facility] [employer]\par \par

## 2022-11-29 NOTE — ASSESSMENT
[FreeTextEntry1] : 52 year old Cambodian male, current 0.5 ppd smoker, with no know significant past medical history who presented to the ED at [facility] after being transferred from [facility] with abnormal ECG findings. At [facility] s/p cardiac cath with severe 3 vessel disease and is now S/P CABG 3 on 10/24. \par \par Post op recovery included inotropic requirement, LV thrombus for which he is on a Heparin gtt, bleeding from SVG site post HAWK pull requiring 1 unit PRBC on 10/27. Colchicine 0.6 mg po bid -hct 22.4 - pt ambulating in hallway - asymptomatic. Echo neg - heparin d/c'd - will d/c home on Eliquis 5 bid & Colchicine .6 bid x 10 days. d/w DR. Ellis. He was discharged to home and agrees to telehealth visit. \par \par He had LT thoracentesis on 11/11/22 and had 1.4 L serosanguineous drained. \par \par \par Today he reports that he is having chocking like feeling at night after he takes eliquis and pain radiating to Lt arm . Denies any shortness of breath, palpitations, dizziness or pedal edema. He relates he stands up waits a min then he no longer has any symptoms.  He denies any STANFORD when walking.\par \par Today on exam patient's lungs clear bilaterally, normal sinus rhythm, sternum stable, incision clean, dry and intact. RT SVG site is clean, dry and intact.  No peripheral edema noted.  Instructed patient on importance of optimal glycemic control, daily showering, daily weights, any signs of fever (temperature greater than 101F, chills,  incentive spirometer use, and increase ambulation as tolerated. Instructed to call office with any signs or symptoms of infection or weight gain of 2 or more pounds in 1 day or 3 or more pounds in 1 week.  \par \par \par Plan:\par 1) Continue current medication regimen\par 2) Follow up with cardiologist ( Dr.Loukas Wayne ) and PCP \par 3) May return on as needed basis \par 4) Ambulate as tolerated \par 5)  Continue to increase activity and walk daily as tolerated. Continue to use incentive spirometer. \par 6) Keep legs elevated above heart when resting/sitting/sleeping. \par 7 ) Call MD if you experience fever, fatigue, dizziness, confusion, syncope, shortness of breath, chest pain not relieved with analgesics, increased redness/drainage from the surgical  incision site\par 8) OTC melatonin for insomnia \par

## 2023-08-10 NOTE — DISCUSSION/SUMMARY
[Slow Progress] : is progressing slowly [Excellent Pain Control] : has excellent pain control [No Sign of Infection] : is showing no signs of infection [0] : 0 [Coumadin] : the patient is not on Coumadin [de-identified] : On Eliquis

## 2023-08-10 NOTE — ASSESSMENT
[FreeTextEntry1] : Abdoul is a 52 year old [place] male, current 0.5 ppd smoker, with no know significant past medical history who presented to the ED at [facility] after being transferred from [facility] with abnormal ECG findings. At [facility] s/p cardiac cath with severe 3 vessel disease and is now S/P CABG 3 on 10/24. \par \par Post op recovery included inotropic requirement, LV thrombus for which he is on a Heparin gtt, bleeding from SVG site post HAWK pull requiring 1 unit PRBC on 10/27. Colchicine 0.6 mg po bid -hct 22.4 - pt ambulating in hallway - asymptomatic. Echo neg - heparin d/c'd - will d/c home on Eliquis 5 bid & Colchicine.6 bid x 10 days. d/w DR. Ellis. He was discharged to home and agrees to telehealth visit. \par \par Today on exam decreased breath sounds at left lung base. No respiratory distress noted. Normal sinus rhythm, SR 90s. Sternum stable, incision clean, dry and intact. LEFT SVG site is clean, dry and intact. LEFT UPPER EXTREMITY. SUTURES REMOVED.  No peripheral edema noted. \par \par He returns today for in office thoracentesis. His Eliquis has been on hold since his office visit earlier this week. He feels well today, denies angina, SOB, STANFORD, PND, orthopnea or syncope. \par \par *Eliquis for LV thrombus* \par \par
